# Patient Record
Sex: FEMALE | Race: WHITE | NOT HISPANIC OR LATINO | Employment: OTHER | URBAN - METROPOLITAN AREA
[De-identification: names, ages, dates, MRNs, and addresses within clinical notes are randomized per-mention and may not be internally consistent; named-entity substitution may affect disease eponyms.]

---

## 2021-05-29 ENCOUNTER — RECORDS - HEALTHEAST (OUTPATIENT)
Dept: ADMINISTRATIVE | Facility: CLINIC | Age: 71
End: 2021-05-29

## 2022-08-24 ENCOUNTER — TRANSFERRED RECORDS (OUTPATIENT)
Dept: HEALTH INFORMATION MANAGEMENT | Facility: CLINIC | Age: 72
End: 2022-08-24

## 2022-08-24 LAB — EJECTION FRACTION: 65 %

## 2022-08-25 ENCOUNTER — TELEPHONE (OUTPATIENT)
Dept: CARDIOLOGY | Facility: CLINIC | Age: 72
End: 2022-08-25

## 2022-08-29 ENCOUNTER — OFFICE VISIT (OUTPATIENT)
Dept: CARDIOLOGY | Facility: CLINIC | Age: 72
End: 2022-08-29
Attending: SURGERY
Payer: COMMERCIAL

## 2022-08-29 VITALS
BODY MASS INDEX: 28.82 KG/M2 | HEIGHT: 66 IN | HEART RATE: 61 BPM | OXYGEN SATURATION: 96 % | DIASTOLIC BLOOD PRESSURE: 93 MMHG | SYSTOLIC BLOOD PRESSURE: 113 MMHG | WEIGHT: 179.3 LBS

## 2022-08-29 DIAGNOSIS — R09.89 OTHER SPECIFIED SYMPTOMS AND SIGNS INVOLVING THE CIRCULATORY AND RESPIRATORY SYSTEMS: ICD-10-CM

## 2022-08-29 DIAGNOSIS — I25.119 CORONARY ARTERY DISEASE INVOLVING NATIVE CORONARY ARTERY OF NATIVE HEART WITH ANGINA PECTORIS (H): Primary | ICD-10-CM

## 2022-08-29 DIAGNOSIS — Z01.810 ENCOUNTER FOR PREPROCEDURAL CARDIOVASCULAR EXAMINATION: ICD-10-CM

## 2022-08-29 DIAGNOSIS — R07.89 OTHER CHEST PAIN: ICD-10-CM

## 2022-08-29 PROCEDURE — 99207 PR NON-BILLABLE SERV PER CHARTING: CPT | Performed by: SURGERY

## 2022-08-29 PROCEDURE — G0463 HOSPITAL OUTPT CLINIC VISIT: HCPCS

## 2022-08-29 RX ORDER — NITROGLYCERIN 0.4 MG/1
0.4 TABLET SUBLINGUAL EVERY 5 MIN PRN
COMMUNITY
Start: 2022-08-24

## 2022-08-29 RX ORDER — ZOLPIDEM TARTRATE 10 MG/1
10 TABLET ORAL
Status: ON HOLD | COMMUNITY
Start: 2022-08-12 | End: 2022-09-19

## 2022-08-29 RX ORDER — GABAPENTIN 100 MG/1
200 CAPSULE ORAL AT BEDTIME
COMMUNITY
Start: 2021-11-16

## 2022-08-29 RX ORDER — PRAVASTATIN SODIUM 10 MG
10 TABLET ORAL EVERY MORNING
COMMUNITY
Start: 2022-08-19 | End: 2023-04-14 | Stop reason: ALTCHOICE

## 2022-08-29 RX ORDER — ACYCLOVIR 400 MG/1
400 TABLET ORAL DAILY PRN
COMMUNITY
Start: 2022-08-22

## 2022-08-29 RX ORDER — PAROXETINE 20 MG/1
20 TABLET, FILM COATED ORAL EVERY MORNING
COMMUNITY
Start: 2022-08-22

## 2022-08-29 RX ORDER — HYDROCHLOROTHIAZIDE 50 MG/1
50 TABLET ORAL EVERY MORNING
Status: ON HOLD | COMMUNITY
Start: 2022-06-12 | End: 2022-09-19

## 2022-08-29 RX ORDER — SPIRONOLACTONE 50 MG/1
100 TABLET, FILM COATED ORAL EVERY MORNING
COMMUNITY
Start: 2022-06-12 | End: 2022-09-19

## 2022-08-29 RX ORDER — METOPROLOL SUCCINATE 25 MG/1
25 TABLET, EXTENDED RELEASE ORAL EVERY MORNING
COMMUNITY
Start: 2022-08-19

## 2022-08-29 RX ORDER — MUPIROCIN 20 MG/G
OINTMENT TOPICAL PRN
COMMUNITY
Start: 2021-09-05

## 2022-08-29 RX ORDER — SODIUM FLUORIDE 5 MG/G
GEL, DENTIFRICE DENTAL
COMMUNITY
Start: 2021-10-18

## 2022-08-29 RX ORDER — NYSTATIN 100000/ML
SUSPENSION, ORAL (FINAL DOSE FORM) ORAL
Status: ON HOLD | COMMUNITY
Start: 2021-12-03 | End: 2022-09-19

## 2022-08-29 RX ORDER — CELECOXIB 200 MG/1
200 CAPSULE ORAL EVERY MORNING
COMMUNITY
Start: 2022-08-01 | End: 2022-09-19

## 2022-08-29 RX ORDER — LISINOPRIL 40 MG/1
40 TABLET ORAL EVERY MORNING
COMMUNITY
Start: 2022-08-01 | End: 2022-09-19

## 2022-08-29 RX ORDER — AMLODIPINE BESYLATE 5 MG/1
5 TABLET ORAL EVERY MORNING
COMMUNITY
Start: 2022-08-01 | End: 2022-09-19

## 2022-08-29 RX ORDER — ASPIRIN 81 MG/1
81 TABLET, CHEWABLE ORAL EVERY MORNING
Status: ON HOLD | COMMUNITY
Start: 2022-08-18 | End: 2022-09-19

## 2022-08-29 RX ORDER — CHLORAL HYDRATE 500 MG
2 CAPSULE ORAL EVERY MORNING
COMMUNITY

## 2022-08-29 ASSESSMENT — PAIN SCALES - GENERAL: PAINLEVEL: NO PAIN (0)

## 2022-08-29 NOTE — LETTER
8/29/2022      RE: Luis Alberto Garcia  [address] [place] [address]       Dear Colleague,    Thank you for the opportunity to participate in the care of your patient, Luis Alberto Garcia, at the [facility]. Please see a copy of my visit note below.    HPI  Luis Alberto Garcia is a 72 year old female who is seeking a second opinion for CABG,  History is obtained from the patient and chart review     Patient who had been experiencing jaw/neck pain, chest pressure, and fatiguefor the past 10 months. She had a lexiscan completed showing normal perfusion imaging but abnormal ECG. Previous CT scan done showing moderate coronary calcifications. She was referred for coronary angiogram on 8/24/22 which revealed severe multivessel coronary disease.      Her history is otherwise significant for hypertension, mild aortic stenosis, smoking, childhood asthma, emphysema, GERD, fatty liver disease, migraines, OA with bilateral knee replacements, anxiety and insomnia.                Past Medical History       Past Medical History:   Diagnosis Date     Anxiety       Centrilobular emphysema (H)       Childhood asthma       Coronary artery disease involving native coronary artery of native heart       Esophageal reflux       Essential hypertension       Fatty liver disease       Hair loss       Migraine       Mild aortic stenosis       Mixed hyperlipidemia       MRSA infection       Skin abscesses after COVID vaccinations     Osteoarthritis       Primary insomnia              Past Surgical History  Past Surgical History         Past Surgical History:   Procedure Laterality Date     CATARACT IOL, RT/LT         IR LUMBAR EPIDURAL STEROID INJECTION   10/06/2005     IR LUMBAR EPIDURAL STEROID INJECTION   11/09/2005     Left fibular fracture ORIF   2013     REPLACEMENT TOTAL KNEE Left 2020     REPLACEMENT TOTAL KNEE Right 11/2021     ROTATOR CUFF REPAIR  RT/LT Right 2017            Prior to Admission Medications  Active Medications          Current Outpatient Medications   Medication Sig Dispense Refill     acyclovir (ZOVIRAX) 400 MG tablet Take 400 mg by mouth daily as needed         albuterol (PROAIR HFA/PROVENTIL HFA/VENTOLIN HFA) 108 (90 Base) MCG/ACT inhaler Inhale 2 puffs into the lungs every 6 hours         amLODIPine (NORVASC) 5 MG tablet Take 5 mg by mouth every morning         aspirin (ASA) 81 MG chewable tablet Take 81 mg by mouth every morning         celecoxib (CELEBREX) 200 MG capsule Take 200 mg by mouth every morning         Ergocalciferol 50 MCG (2000 UT) TABS 50 mcg every morning         fish oil-omega-3 fatty acids 1000 MG capsule Take 2 g by mouth every morning         gabapentin (NEURONTIN) 100 MG capsule 200 mg At Bedtime         hydrochlorothiazide (HYDRODIURIL) 50 MG tablet Take 50 mg by mouth every morning         lisinopril (ZESTRIL) 40 MG tablet Take 40 mg by mouth every morning         magnesium 100 MG CAPS 200 mg At Bedtime         metoprolol succinate ER (TOPROL XL) 25 MG 24 hr tablet Take 25 mg by mouth every morning         mupirocin (BACTROBAN) 2 % external ointment as needed         nitroGLYcerin (NITROSTAT) 0.4 MG sublingual tablet 0.4 mg every 5 minutes as needed         omeprazole (PRILOSEC) 20 MG DR capsule 20 mg At Bedtime         PARoxetine (PAXIL) 20 MG tablet Take 20 mg by mouth every morning         pravastatin (PRAVACHOL) 10 MG tablet Take 10 mg by mouth every morning         sodium fluoride dental gel (PREVIDENT) 1.1 % GEL topical gel Brush 2x/day.  Do not eat or drink for 30 minutes after.         spironolactone (ALDACTONE) 50 MG tablet 100 mg every morning         zolpidem (AMBIEN) 10 MG tablet 10 mg nightly as needed         nystatin (MYCOSTATIN) 770757 UNIT/ML suspension SWISH AND SWALLOW 5 ML BY MOUTH 4 TIMES A DAY. (Patient not taking: Reported on 9/8/2022)                Allergies        Allergies   Allergen  Reactions     Atorvastatin       Kiwi       Latex       Other Drug Allergy (See Comments)         X ray dye and shingrex vaccine          Social History  Social History   Social History            Socioeconomic History     Marital status: Single       Spouse name: Not on file     Number of children: Not on file     Years of education: Not on file     Highest education level: Not on file   Occupational History     Not on file   Tobacco Use     Smoking status: Former Smoker       Packs/day: 1.00       Years: 25.00       Pack years: 25.00       Types: Cigarettes       Quit date: 1992       Years since quittin.6     Smokeless tobacco: Never Used   Substance and Sexual Activity     Alcohol use: Yes       Comment: socially     Drug use: Not on file     Sexual activity: Not on file   Other Topics Concern     Not on file   Social History Narrative     Not on file      Social Determinants of Health      Financial Resource Strain: Not on file   Food Insecurity: Not on file   Transportation Needs: Not on file   Physical Activity: Not on file   Stress: Not on file   Social Connections: Not on file   Intimate Partner Violence: Not on file   Housing Stability: Not on file            Family History  Family History         Family History   Problem Relation Age of Onset     Lung Cancer Mother       LUNG DISEASE Father       Hypothyroidism Sister       Hypothyroidism Sister       Osteosarcoma Sister       Anesthesia Reaction No family hx of              Review of Systems  The complete review of systems is negative other than noted in the HPI or here.       Physical Exam  Vitals stable  Constitutional: Awake, alert, no apparent distress, and appears stated age.  HENT: Normocephalic  Respiratory: non labored breathing; no cough  Neurologic: Oriented to name, place and time. No focal deficits  RESP: normal breath sounds  Abd: soft, BS normal  Neuropsychiatric: Calm, cooperative. Normal affect.      Prior Labs/Diagnostic  Studies   All labs and imaging personally reviewed         Lab Results   Component Value Date     WBC 8.3 09/08/2022            Lab Results   Component Value Date     RBC 4.39 09/08/2022            Lab Results   Component Value Date     HGB 13.1 09/08/2022            Lab Results   Component Value Date     HCT 40.0 09/08/2022            Lab Results   Component Value Date     MCV 91 09/08/2022            Lab Results   Component Value Date     MCH 29.8 09/08/2022            Lab Results   Component Value Date     MCHC 32.8 09/08/2022            Lab Results   Component Value Date     RDW 13.6 09/08/2022            Lab Results   Component Value Date      09/08/2022      Last Comprehensive Metabolic Panel:        Sodium   Date Value Ref Range Status   09/08/2022 138 136 - 145 mmol/L Final            Potassium   Date Value Ref Range Status   09/08/2022 5.4 (H) 3.4 - 5.3 mmol/L Final            Chloride   Date Value Ref Range Status   09/08/2022 102 98 - 107 mmol/L Final            Carbon Dioxide (CO2)   Date Value Ref Range Status   09/08/2022 25 22 - 29 mmol/L Final            Anion Gap   Date Value Ref Range Status   09/08/2022 11 7 - 15 mmol/L Final            Glucose   Date Value Ref Range Status   09/08/2022 108 (H) 70 - 99 mg/dL Final            Urea Nitrogen   Date Value Ref Range Status   09/08/2022 25.9 (H) 8.0 - 23.0 mg/dL Final            Creatinine   Date Value Ref Range Status   09/08/2022 0.88 0.51 - 0.95 mg/dL Final              GFR Estimate   Date Value Ref Range Status   09/08/2022 69 >60 mL/min/1.73m2 Final       Comment:       Effective December 21, 2021 eGFRcr in adults is calculated using the 2021 CKD-EPI creatinine equation which includes age and gender (Skye et al., NEJM, DOI: 10.1056/SFAKry0414468)            Calcium   Date Value Ref Range Status   09/08/2022 9.8 8.8 - 10.2 mg/dL Final            Bilirubin Total   Date Value Ref Range Status   09/08/2022 0.8 <=1.2 mg/dL Final             Alkaline Phosphatase   Date Value Ref Range Status   2022 81 35 - 104 U/L Final            ALT   Date Value Ref Range Status   2022 21 10 - 35 U/L Final            AST   Date Value Ref Range Status   2022 23 10 - 35 U/L Final      INR 1.08  PTT 29     OSH 22 TSH 1.09     EK22 Sinus rhythm     Carotid US 22  IMPRESSION:     1. RIGHT ICA: Less than 50% diameter narrowing by grayscale imaging  and sonographic velocity criteria.     2. LEFT ICA:  Less than 50% diameter narrowing by grayscale imaging  and sonographic velocity criteria.     22 Echocardiogram   Summary   1. The left ventricular size is normal.  Systolic function is normal.  The estimated ejection fraction is 65%.  Wall thickness is normal.  Left ventricular segmental wall motion is normal.   2. The right ventricular size is normal.  Systolic function is normal.   3. There is mild aortic valve stenosis with a peak velocity of 269.00 cm/s, mean gradient of 18 mmHg, and aortic valve area of 1.54 cm2.   4. There is mild aortic valve regurgitation.   5. Compared to prior no significant change in AS.       22 Coronary Angiogram  Conclusions   1. Severe multivessel coronary artery disease. 70% prox LAD, 80% D1, 80% mid LCx, 70% OM1, 80% mid RCA.   2. Normal filling pressures. RA 4 mmHg, PA 25/11 (16) mmHg, PCWP/LVEDP 12 mmHg.   3. 20 mmHg peak to peak gradient across AV on pull back.   Recommendations   Continue medical management and risk factor modification.   Eval for CABG.      CXR 22   IMPRESSION: Negative chest.     CT Chest 22  IMPRESSION:   1. Decreased micronodular and ground glass opacities in the right  middle lobe, likely improving infectious process, with differential  including nontuberculous mycobacterial infection.  2. New 4 mm solid pulmonary nodule in the right upper lobe. If the  patient is low risk for lung cancer, no follow-up is required. If the  patient is high risk, consider follow-up  chest CT in 12 months per  Fleischner Society Guidelines.        The patient's records and results personally reviewed by me.      Outside records reviewed from: Care Everywhere        Assessment     Luis Alberto Garcia is a 72 year old female with severe coronary artery disease for which she was recommended CABG. I agree with this plan. I discussed the risks and benefits of surgery with patient and family including the risks of death, bleeding, stroke, infection, renal failure and arrhythmias. She understands and is willing to proceed with surgery.  She also has mild aortic stenosis on the ECHO. My recommendation would be to follow this with annual echo and consider her for TAVR at a later date.        Please do not hesitate to contact me if you have any questions/concerns.     Sincerely,     Galo Pathak MD     No

## 2022-08-29 NOTE — LETTER
Date:September 12, 2022      Patient was self referred, no letter generated. Do not send.        [facility] Health Information

## 2022-08-29 NOTE — LETTER
[facility] HEART [facility]    CARDIOTHORACIC SURGERY PRE-OP INSTRUCTIONS  Your Heart Surgery is scheduled with Galo Pathak MD  On 9/14/22 at 0730 am.  Please arrive at 0500 am.    Report to the information desk in the front lobby of the hospital at [address], [place], [place] [address]. When you walk in the main entrance of the hospital it is directly in front of you. Ask for an escort or the  can help direct you. You will then be escorted or directed to  on the third floor for your surgery preparation.     Effective 2/28/22 [facility] is implementing the following visitor policy:     1 person may accompany the patient through the Pre-Op process.      That same person may wait in the Surgery Waiting room, provided there is enough room to social distance       Inpatients are allowed 2 visitors per day for the duration of their stay.      Visitors must wear a mask.      Visitors must not be ill.      Visiting hours are 8 am to 8 pm.  "[employer] parking is available for anyone with mobility limitations or disabilities.  ([email or web address]  24 hours/ 7 days a week; [employer]  7 am- 3:30 pm, Mon- Fri)    COVID 19 TESTING    If you re staying overnight in the hospital: 4 days before your procedure, please get a PCR test from a lab, if the lab is requesting an order they can call . Tests that are 4 days or more prior to surgical date will not be accepted.  To schedule a PCR test with [facility], call [phone]. Or, visit [email or web address].  Please ask the testing location to fax your results to us at [phone].  If your test is positive, please contact Zane Myers RNCC at  to reschedule surgery.    After your test and before your procedure, please follow these safety guidelines:    Limit trips outside your home.    Limit the number of people you see.    Always wear a face covering outside your home.    Use social distancing. Stay 6 feet away from  others    whenever you can.    Wash your hands often.After the COVID test please protect yourself by following the CDC recommendations for disease prevention.  Wash hands regularly with soap and water and use hand    if soap and water is not available, wear a face mask, separate yourself from others by 6 feet and keep your hands away from your face.      Call your surgery coordinator Zane Myers RN or the [facility] number ([phone]) if you develop any of the following symptoms; fever, cough, shortness of breath, sore throat, runny or stuffy nose, muscle or body aches, headaches, fatigue, vomiting or diarrhea.      INSTRUCTIONS PRIOR TO SURGERY    Please review this letter and the enclosed booklet and other information in this surgery folder carefully and call Zane Myers RN at [phone] with any questions or concerns.      MEDICATIONS    ASPIRIN is okay to take up to the day before your surgery but NOT the day of your surgery. Take your other medications as you normally would until the day of surgery unless instructed otherwise. If you do not take aspirin do not start aspirin unless instructed otherwise.      Take your last dose of Aspirin on  9/13/22    STOP ALL ANTIINFLAMMATORY MEDICATIONS: (Ibuprofen, Aleve, Advil, Celebrex, Votaren, Ketoprofen, and Naproxen) 10 days prior to your surgery  STOP ALL SUPPLEMENTS 10 days prior to your surgery. This includes stopping Co-Q 10, vitamin E and all fish oil supplements.   Please check the labels on any OTC eye vitamins you may be taking.  They often contain vitamin E and should be stopped 10 days prior to surgery.     MEDICATIONS THE MORNING OF SURGERY    Take your Metoprolol the morning of surgery with a drink of clear liquid. Please tell your anesthesiologist what medication you took and at what time.     HISTORY AND PHYSICAL:  LABS and IMAGING  All tests and procedures must be within 30 days of your surgery date.     You do NOT need to fast for the  blood work.      You have a pre-op clinic visit beginning on 9/8/22 at 1315 in the [facility], [address] [place] [address]. A  or Coordinator will assist you. The Pre Assessment/Anesthesia Clinic is on the fifth floor.  The laboratory and radiology is on the first floor.      Your schedule is as follows:  9/8/22:  0115 PM Lab blood draw  0240 PM CT Chest  0300 PM Ultrasound lower extremities  0400 PM Ultrasound carotid arteries  0600 PM Pre anesthesia consult visit (Video visit)  9/12/22  0130 PM Transthoracic Echocardiogram  0230 PM EKG  0300 PM Covid Test  DO NOT EAT ANY SOLID FOODS AFTER MIDNIGHT or the morning of your surgery. NO MILK, MILK PRODUCTS, SMOOTHIES 8 HOURS PRIOR TO SURGERY.      DO NOT EAT ANYTHING, however you may have any clear liquids; water, black coffee (sugar okay), clear tea, sprite, ginger ale, apple juice, Gatorade or any clear liquid up to two hours before your surgery time. (No paige tea) No milk, or milk products, no smoothies or juice with pulp.     No alcohol for 24 hours prior to surgery.         BELONGINGS  Do not bring personal belongings, jewelry, money, valuables, toiletries or medications to the hospital the morning of your surgery. You may pack a bag and give it to a family member or friend to bring the following day or when needed.   Please remove all jewelry, including body piercings. Cautery instruments are used during surgery that may pose a risk of burns if a body piercing is left in during surgery.     Do bring a photo ID and insurance card.  A copy of your health care directives, if you have one.  Glasses and hearing aids (bring cases).  You cannot wear contact lenses during the surgery.  Inhaler(s) and eye drops if you use them, tell the staff about them when you arrive. Bring your CPAP machine or breathing device, if you use them.  If you have a pacemaker or ICD (cardiac defibrillator) bring the ID card.  If you have an  implanted stimulator, bring the remote control.  If you are a legal guardian bring a copy of the certified (court-stamped) guardianship papers.      Call Ce our  with questions regarding your test and clinic visit dates/times. She can be reached by phone at [phone] between 8 AM and 4:30PM Monday through Friday. Our answering service will  calls outside of those business hours.     If you have questions about your medications, test results or have a change in your health status call Zane Myers RN at [phone] during regular business hours.      On weekends or after 4:30 please call [phone] and ask the  to page the Cardiothoracic Fellow, Nurse Practitioner, Physician Assistant or Staff on call that weekend.     PLEASE NOTE, there are times when elective surgery (like yours) needs to be rescheduled due to an unplanned emergency or heart transplant. If this should happen, your surgery will be rescheduled as quickly as possible. Our surgery team appreciates your understanding during these instances.       Please feel free to call me or our office with any questions.     Thank you,      Zane Myers RN  Cardiothoracic Surgery Coordinator  [phone]  Direct Phone  [phone]  Fax

## 2022-08-29 NOTE — NURSING NOTE
Patient seen today for consultation for CABG    Surgery procedure explained to patient, daughter and son and all questions and concerns were answered and addressed.       Reviewed pre surgery tests and procedures needed and will call patient to schedule.      Pre surgery preparation folder with instructions for surgery preparation and recovery will be mailed to the patient.     Patient will call us tomorrow and inform us of her decision if she wants to have surgery done at [facility]. Then we will schedule a TTE and rest of the pre operative tests.     Instructed patient on preparation for TTE.     Patient and family verbalized understanding of all instructions and will call with any questions or concerns.       Zane Myers, RNCC  Cardiothoracic Surgery   [facility]  O) [phone]  F) [phone]

## 2022-08-29 NOTE — NURSING NOTE
Chief Complaint   Patient presents with     New Patient     New CV surgery - CABG           Vitals were taken and medications reconciled.     Jerome Randall, EMT   2:48 PM

## 2022-08-30 ENCOUNTER — TELEPHONE (OUTPATIENT)
Dept: CARDIOLOGY | Facility: CLINIC | Age: 72
End: 2022-08-30

## 2022-08-30 ENCOUNTER — PREP FOR PROCEDURE (OUTPATIENT)
Dept: CARDIOLOGY | Facility: CLINIC | Age: 72
End: 2022-08-30

## 2022-08-30 DIAGNOSIS — I25.10 CAD (CORONARY ARTERY DISEASE): Primary | ICD-10-CM

## 2022-08-30 NOTE — TELEPHONE ENCOUNTER
Called pt to inform of pre op appts scheduled.  Emailed pt Visitors Guide at pts request.  Pt will call with any questions.

## 2022-08-31 ENCOUNTER — TELEPHONE (OUTPATIENT)
Dept: CARDIOLOGY | Facility: CLINIC | Age: 72
End: 2022-08-31

## 2022-09-02 RX ORDER — CEFAZOLIN SODIUM/WATER 2 G/20 ML
2 SYRINGE (ML) INTRAVENOUS SEE ADMIN INSTRUCTIONS
Status: CANCELLED | OUTPATIENT
Start: 2022-09-02

## 2022-09-02 RX ORDER — CEFAZOLIN SODIUM/WATER 2 G/20 ML
2 SYRINGE (ML) INTRAVENOUS
Status: CANCELLED | OUTPATIENT
Start: 2022-09-02

## 2022-09-02 RX ORDER — DEXMEDETOMIDINE HYDROCHLORIDE 4 UG/ML
0.2-1.2 INJECTION, SOLUTION INTRAVENOUS CONTINUOUS
Status: CANCELLED | OUTPATIENT
Start: 2022-09-02

## 2022-09-02 RX ORDER — PHENYLEPHRINE HCL IN 0.9% NACL 50MG/250ML
.1-6 PLASTIC BAG, INJECTION (ML) INTRAVENOUS CONTINUOUS
Status: CANCELLED | OUTPATIENT
Start: 2022-09-02

## 2022-09-02 RX ORDER — ACETAMINOPHEN 325 MG/1
975 TABLET ORAL ONCE
Status: CANCELLED | OUTPATIENT
Start: 2022-09-02 | End: 2022-09-02

## 2022-09-02 RX ORDER — ASPIRIN 81 MG/1
162 TABLET, CHEWABLE ORAL
Status: CANCELLED | OUTPATIENT
Start: 2022-09-02

## 2022-09-02 RX ORDER — NOREPINEPHRINE BITARTRATE 0.06 MG/ML
.01-.1 INJECTION, SOLUTION INTRAVENOUS CONTINUOUS
Status: CANCELLED | OUTPATIENT
Start: 2022-09-02

## 2022-09-02 RX ORDER — FAMOTIDINE 20 MG/1
20 TABLET, FILM COATED ORAL
Status: CANCELLED | OUTPATIENT
Start: 2022-09-02

## 2022-09-02 RX ORDER — ASPIRIN 81 MG/1
81 TABLET, CHEWABLE ORAL
Status: CANCELLED | OUTPATIENT
Start: 2022-09-02

## 2022-09-02 RX ORDER — LIDOCAINE 40 MG/G
CREAM TOPICAL
Status: CANCELLED | OUTPATIENT
Start: 2022-09-02

## 2022-09-02 RX ORDER — CHLORHEXIDINE GLUCONATE ORAL RINSE 1.2 MG/ML
10 SOLUTION DENTAL ONCE
Status: CANCELLED | OUTPATIENT
Start: 2022-09-02 | End: 2022-09-02

## 2022-09-02 RX ORDER — GABAPENTIN 100 MG/1
100 CAPSULE ORAL
Status: CANCELLED | OUTPATIENT
Start: 2022-09-02

## 2022-09-07 LAB
ABO/RH(D): NORMAL
ANTIBODY SCREEN: NEGATIVE
SPECIMEN EXPIRATION DATE: NORMAL

## 2022-09-08 ENCOUNTER — ANESTHESIA EVENT (OUTPATIENT)
Dept: SURGERY | Facility: CLINIC | Age: 72
DRG: 236 | End: 2022-09-08
Payer: COMMERCIAL

## 2022-09-08 ENCOUNTER — ANCILLARY PROCEDURE (OUTPATIENT)
Dept: CT IMAGING | Facility: CLINIC | Age: 72
End: 2022-09-08
Attending: SURGERY
Payer: COMMERCIAL

## 2022-09-08 ENCOUNTER — PRE VISIT (OUTPATIENT)
Dept: SURGERY | Facility: CLINIC | Age: 72
End: 2022-09-08

## 2022-09-08 ENCOUNTER — LAB (OUTPATIENT)
Dept: LAB | Facility: CLINIC | Age: 72
End: 2022-09-08
Payer: COMMERCIAL

## 2022-09-08 ENCOUNTER — VIRTUAL VISIT (OUTPATIENT)
Dept: SURGERY | Facility: CLINIC | Age: 72
End: 2022-09-08

## 2022-09-08 ENCOUNTER — ANCILLARY PROCEDURE (OUTPATIENT)
Dept: ULTRASOUND IMAGING | Facility: CLINIC | Age: 72
End: 2022-09-08
Attending: SURGERY
Payer: COMMERCIAL

## 2022-09-08 DIAGNOSIS — I25.118 CORONARY ARTERY DISEASE INVOLVING NATIVE CORONARY ARTERY OF NATIVE HEART WITH OTHER FORM OF ANGINA PECTORIS (H): ICD-10-CM

## 2022-09-08 DIAGNOSIS — I25.119 CORONARY ARTERY DISEASE INVOLVING NATIVE CORONARY ARTERY OF NATIVE HEART WITH ANGINA PECTORIS (H): ICD-10-CM

## 2022-09-08 DIAGNOSIS — Z01.818 PREOP EXAMINATION: Primary | ICD-10-CM

## 2022-09-08 DIAGNOSIS — Z01.810 ENCOUNTER FOR PREPROCEDURAL CARDIOVASCULAR EXAMINATION: ICD-10-CM

## 2022-09-08 DIAGNOSIS — R07.89 OTHER CHEST PAIN: ICD-10-CM

## 2022-09-08 LAB
ALBUMIN SERPL BCG-MCNC: 4.3 G/DL (ref 3.5–5.2)
ALBUMIN UR-MCNC: NEGATIVE MG/DL
ALP SERPL-CCNC: 81 U/L (ref 35–104)
ALT SERPL W P-5'-P-CCNC: 21 U/L (ref 10–35)
ANION GAP SERPL CALCULATED.3IONS-SCNC: 11 MMOL/L (ref 7–15)
APPEARANCE UR: CLEAR
APTT PPP: 29 SECONDS (ref 22–38)
AST SERPL W P-5'-P-CCNC: 23 U/L (ref 10–35)
BILIRUB SERPL-MCNC: 0.8 MG/DL
BILIRUB UR QL STRIP: NEGATIVE
BUN SERPL-MCNC: 25.9 MG/DL (ref 8–23)
CALCIUM SERPL-MCNC: 9.8 MG/DL (ref 8.8–10.2)
CHLORIDE SERPL-SCNC: 102 MMOL/L (ref 98–107)
COLOR UR AUTO: YELLOW
CREAT SERPL-MCNC: 0.88 MG/DL (ref 0.51–0.95)
DEPRECATED HCO3 PLAS-SCNC: 25 MMOL/L (ref 22–29)
ERYTHROCYTE [DISTWIDTH] IN BLOOD BY AUTOMATED COUNT: 13.6 % (ref 10–15)
GFR SERPL CREATININE-BSD FRML MDRD: 69 ML/MIN/1.73M2
GLUCOSE SERPL-MCNC: 108 MG/DL (ref 70–99)
GLUCOSE UR STRIP-MCNC: NEGATIVE MG/DL
HCT VFR BLD AUTO: 40 % (ref 35–47)
HGB BLD-MCNC: 13.1 G/DL (ref 11.7–15.7)
HGB UR QL STRIP: NEGATIVE
INR PPP: 1.08 (ref 0.85–1.15)
KETONES UR STRIP-MCNC: NEGATIVE MG/DL
LEUKOCYTE ESTERASE UR QL STRIP: NEGATIVE
MCH RBC QN AUTO: 29.8 PG (ref 26.5–33)
MCHC RBC AUTO-ENTMCNC: 32.8 G/DL (ref 31.5–36.5)
MCV RBC AUTO: 91 FL (ref 78–100)
MUCOUS THREADS #/AREA URNS LPF: PRESENT /LPF
NITRATE UR QL: NEGATIVE
PH UR STRIP: 6 [PH] (ref 5–7)
PLATELET # BLD AUTO: 302 10E3/UL (ref 150–450)
POTASSIUM SERPL-SCNC: 5.4 MMOL/L (ref 3.4–5.3)
PROT SERPL-MCNC: 6.8 G/DL (ref 6.4–8.3)
RBC # BLD AUTO: 4.39 10E6/UL (ref 3.8–5.2)
RBC URINE: 1 /HPF
SODIUM SERPL-SCNC: 138 MMOL/L (ref 136–145)
SP GR UR STRIP: 1.01 (ref 1–1.03)
SQUAMOUS EPITHELIAL: <1 /HPF
UROBILINOGEN UR STRIP-MCNC: NORMAL MG/DL
WBC # BLD AUTO: 8.3 10E3/UL (ref 4–11)
WBC URINE: <1 /HPF

## 2022-09-08 PROCEDURE — 85730 THROMBOPLASTIN TIME PARTIAL: CPT | Performed by: PATHOLOGY

## 2022-09-08 PROCEDURE — 84134 ASSAY OF PREALBUMIN: CPT | Mod: 90 | Performed by: PATHOLOGY

## 2022-09-08 PROCEDURE — 86901 BLOOD TYPING SEROLOGIC RH(D): CPT | Mod: 90 | Performed by: PATHOLOGY

## 2022-09-08 PROCEDURE — 99205 OFFICE O/P NEW HI 60 MIN: CPT | Mod: GT | Performed by: CLINICAL NURSE SPECIALIST

## 2022-09-08 PROCEDURE — 85027 COMPLETE CBC AUTOMATED: CPT | Performed by: PATHOLOGY

## 2022-09-08 PROCEDURE — 36415 COLL VENOUS BLD VENIPUNCTURE: CPT | Performed by: PATHOLOGY

## 2022-09-08 PROCEDURE — 85610 PROTHROMBIN TIME: CPT | Performed by: PATHOLOGY

## 2022-09-08 PROCEDURE — 71250 CT THORAX DX C-: CPT | Mod: GC | Performed by: RADIOLOGY

## 2022-09-08 PROCEDURE — 86850 RBC ANTIBODY SCREEN: CPT | Mod: 90 | Performed by: PATHOLOGY

## 2022-09-08 PROCEDURE — 86900 BLOOD TYPING SEROLOGIC ABO: CPT | Mod: 90 | Performed by: PATHOLOGY

## 2022-09-08 PROCEDURE — 99000 SPECIMEN HANDLING OFFICE-LAB: CPT | Performed by: PATHOLOGY

## 2022-09-08 PROCEDURE — 93970 EXTREMITY STUDY: CPT | Performed by: RADIOLOGY

## 2022-09-08 PROCEDURE — 80053 COMPREHEN METABOLIC PANEL: CPT | Performed by: PATHOLOGY

## 2022-09-08 PROCEDURE — 81001 URINALYSIS AUTO W/SCOPE: CPT | Performed by: PATHOLOGY

## 2022-09-08 RX ORDER — ALBUTEROL SULFATE 90 UG/1
2 AEROSOL, METERED RESPIRATORY (INHALATION) EVERY 6 HOURS
COMMUNITY

## 2022-09-08 ASSESSMENT — ENCOUNTER SYMPTOMS
DYSRHYTHMIAS: 0
SEIZURES: 0

## 2022-09-08 ASSESSMENT — COPD QUESTIONNAIRES: COPD: 1

## 2022-09-08 ASSESSMENT — PAIN SCALES - GENERAL: PAINLEVEL: NO PAIN (0)

## 2022-09-08 ASSESSMENT — LIFESTYLE VARIABLES: TOBACCO_USE: 1

## 2022-09-08 NOTE — PATIENT INSTRUCTIONS
Preparing for Your Surgery      Name:  Luis Alberto Garcia   MRN:  [id number]   :  1950   Today's Date:  2022       Arriving for surgery:  Surgery date:  22  Arrival time:  05:00 am       Visiting hours: 8 a.m. to 8:30 p.m.     Hospital: Adult patients and children (under age 18 can have 4 visitor at a time     No visitors under the age of 5 are allowed for hospital patients.     Surgeries and procedures: Adult patients can have 2 visitors all through the surgery process.     Patients with disabilities: Can have a support person with them (family member, service provider     Or someone well informed about their needs) plus the allowed number of visitors     Patients confirmed or suspected to have symptoms of COVID 19 or flu:     No visitors allowed for adult patients.   Children (under age 18) can have 1 named visitor.     People who are sick or showing symptoms of COVID 19 or flu:    Are not allowed to visit patients--we can only make exceptions in special situations.       Please follow these guidelines for your visit:   Arrive wearing a mask over your mouth and nose; we will give you a medical mask to wear    If you arrive wearing a cloth mask.   Keep it on during your entire visit, even when in patient's room.   If you don't wear a mask we'll ask you to leave.     Clean your hands with alcohol hand . Do this when you arrive at and leave the building and patient room,    And again after you touch your mask or anything in the room.     You can t visit if you have a fever, cough, shortness of breath, muscle aches, headaches, sore throat    Or diarrhea      Stay 6 feet away from others during your visit and between visits     Go directly to and from the room you are visiting.     Stay in the patient s room during your visit. Limit going to other places in the hospital as much as possible     Leave bags and jackets at home or in the car.     For everyone s health, please don t come and go during  your visit. That includes for smoking   during your visit. That includes for smoking    Please come to:   [facility] [address]  [address] [place]  [address]  - ? parking is available in front of the hospital    -    Please proceed to [address] on the 3rd floor. [phone]?     - ?If you are in need of directions, wheelchair or escort please stop at the Information Desk in the lobby.  Inform the information person that you are here for surgery; a wheelchair and escort to [address] will be provided.?     What can I eat or drink?  -  You may eat and drink normally for up to 8 hours before your surgery.   -  You may have clear liquids until 2 hours before surgery.     Examples of clear liquids:  Water  Clear broth  Juices (apple, white grape, white cranberry  and cider) without pulp  Noncarbonated, powder based beverages  (lemonade and Jovon-Aid)  Sodas (Sprite, 7-Up, ginger ale and seltzer)  Coffee or tea (without milk or cream)  Gatorade    -  No Alcohol for at least 24 hours before surgery.     Which medicines can I take?  Hold Multivitamins for 7 days before surgery.  Hold Supplements (fish oil) for 7 days before surgery.  Hold Ibuprofen (Advil, Motrin) for 1 day before surgery--unless otherwise directed by surgeon.  Hold Naproxen (Aleve) for 4 days before surgery.  Hold celebrex x 3 days before surgery.    -  DO NOT take these medications the day of surgery:  Aspirin + lisinopril + spironolactone.    -  PLEASE TAKE these medications the day of surgery:  Tylenol if needed; take other morning medications.  Bring inhaler if using.    How do I prepare myself?  - Please take 2 showers before surgery using Scrubcare or Hibiclens soap.    Use this soap only from the neck to your toes.     Leave the soap on your skin for one minute--then rinse thoroughly.      You may use your own shampoo and conditioner. No other hair products.   - Please remove all  jewelry and body piercings.  - No lotions, deodorants or fragrance.  - No makeup or fingernail polish.   - Bring your ID and insurance card.    -If you have a Deep Brain Stimulator, Spinal Cord Stimulator, or any Neuro Stimulator device---you must bring the remote control to the hospital.        Questions or Concerns:    - For any questions regarding the day of surgery or your hospital stay, please contact the Pre Admission Nursing Office at [phone].       - If you have health changes between today and your surgery, please call your surgeon.       - For questions after surgery, please call your surgeons office.

## 2022-09-08 NOTE — H&P
Pre-Operative H & P     CC:  Preoperative exam to assess for increased cardiopulmonary risk while undergoing surgery and anesthesia.    Date of Encounter: 9/8/2022  Primary Care Physician:  Shiv Gomez     Reason for visit:   Encounter Diagnoses   Name Primary?     Preop examination Yes     Coronary artery disease        HPI  Luis Alberto Garcia is a 72 year old female who presents for pre-operative H & P in preparation for  Procedure Information     Case: [id number] Date/Time: 09/14/22 0730    Procedure: CORONARY ARTERY BYPASS GRAFT (CABG) AND ANY ASSOCIATED PROCEDURES (N/A Chest)    Anesthesia type: General    Diagnosis: CAD (coronary artery disease) [I25.10]    Pre-op diagnosis: CAD (coronary artery disease) [I25.10]    Location:  OR [address] [place] [address] OR    Providers: Galo Pathak MD        History is obtained from the patient and chart review    Patient who had been experiencing jaw/neck pain, chest pressure, and DYSPNEA ON EXERTION for the past 10 months. She had a lexiscan completed showing normal perfusion imaging but abnormal ECG. Previous CT scan done showing moderate coronary calcifications. She was referred for coronary angiogram on 8/24/22 which revealed severe multivessel coronary disease. She was referred to Dr. Pathak and counseled for above procedures.    Her history is otherwise significant for HLD, HYPERTENSION, mild aortic stenosis, smoking, childhood asthma, emphysema, GERD, fatty liver disease, migraines, OA with bilateral knee replacements, anxiety and insomnia.      Today patient reported two other health issues. 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 She takes gabapentin at night which helps.     She also reports that she has had episodes of significant MRSA skin infections after receiving her COVID vaccinations. She was treated but she is concerned about this for  her upcoming surgery. She does have some mupirocin ointment at home and is wondering if she should start using it.     She is an RN    Hx of abnormal bleeding or anti-platelet use: ASA 81 mg daily    Menstrual history: No LMP recorded. Patient is postmenopausal.     Past Medical History  Past Medical History:   Diagnosis Date     Anxiety      Centrilobular emphysema (H)      Childhood asthma      Coronary artery disease involving native coronary artery of native heart      Esophageal reflux      Essential hypertension      Fatty liver disease      Hair loss      Migraine      Mild aortic stenosis      Mixed hyperlipidemia      MRSA infection     Skin abscesses after COVID vaccinations     Osteoarthritis      Primary insomnia        Past Surgical History  Past Surgical History:   Procedure Laterality Date     CATARACT IOL, RT/LT       IR LUMBAR EPIDURAL STEROID INJECTION  10/06/2005     IR LUMBAR EPIDURAL STEROID INJECTION  11/09/2005     Left fibular fracture ORIF  2013     REPLACEMENT TOTAL KNEE Left 2020     REPLACEMENT TOTAL KNEE Right 11/2021     ROTATOR CUFF REPAIR RT/LT Right 2017       Prior to Admission Medications  Current Outpatient Medications   Medication Sig Dispense Refill     acyclovir (ZOVIRAX) 400 MG tablet Take 400 mg by mouth daily as needed       albuterol (PROAIR HFA/PROVENTIL HFA/VENTOLIN HFA) 108 (90 Base) MCG/ACT inhaler Inhale 2 puffs into the lungs every 6 hours       amLODIPine (NORVASC) 5 MG tablet Take 5 mg by mouth every morning       aspirin (ASA) 81 MG chewable tablet Take 81 mg by mouth every morning       celecoxib (CELEBREX) 200 MG capsule Take 200 mg by mouth every morning       Ergocalciferol 50 MCG (2000 UT) TABS 50 mcg every morning       fish oil-omega-3 fatty acids 1000 MG capsule Take 2 g by mouth every morning       gabapentin (NEURONTIN) 100 MG capsule 200 mg At Bedtime       hydrochlorothiazide (HYDRODIURIL) 50 MG tablet Take 50 mg by mouth every morning       lisinopril  (ZESTRIL) 40 MG tablet Take 40 mg by mouth every morning       magnesium 100 MG CAPS 200 mg At Bedtime       metoprolol succinate ER (TOPROL XL) 25 MG 24 hr tablet Take 25 mg by mouth every morning       mupirocin (BACTROBAN) 2 % external ointment as needed       nitroGLYcerin (NITROSTAT) 0.4 MG sublingual tablet 0.4 mg every 5 minutes as needed       omeprazole (PRILOSEC) 20 MG DR capsule 20 mg At Bedtime       PARoxetine (PAXIL) 20 MG tablet Take 20 mg by mouth every morning       pravastatin (PRAVACHOL) 10 MG tablet Take 10 mg by mouth every morning       sodium fluoride dental gel (PREVIDENT) 1.1 % GEL topical gel Brush 2x/day.  Do not eat or drink for 30 minutes after.       spironolactone (ALDACTONE) 50 MG tablet 100 mg every morning       zolpidem (AMBIEN) 10 MG tablet 10 mg nightly as needed       nystatin (MYCOSTATIN) 252839 UNIT/ML suspension SWISH AND SWALLOW 5 ML BY MOUTH 4 TIMES A DAY. (Patient not taking: Reported on 2022)         Allergies  Allergies   Allergen Reactions     Atorvastatin      Kiwi      Latex      Other Drug Allergy (See Comments)      X ray dye and shingrex vaccine        Social History  Social History     Socioeconomic History     Marital status: Single     Spouse name: Not on file     Number of children: Not on file     Years of education: Not on file     Highest education level: Not on file   Occupational History     Not on file   Tobacco Use     Smoking status: Former Smoker     Packs/day: 1.00     Years: 25.00     Pack years: 25.00     Types: Cigarettes     Quit date: 1992     Years since quittin.6     Smokeless tobacco: Never Used   Substance and Sexual Activity     Alcohol use: Yes     Comment: socially     Drug use: Not on file     Sexual activity: Not on file   Other Topics Concern     Not on file   Social History Narrative     Not on file     Social Determinants of Health     Financial Resource Strain: Not on file   Food Insecurity: Not on file   Transportation  Needs: Not on file   Physical Activity: Not on file   Stress: Not on file   Social Connections: Not on file   Intimate Partner Violence: Not on file   Housing Stability: Not on file       Family History  Family History   Problem Relation Age of Onset     Lung Cancer Mother      LUNG DISEASE Father      Hypothyroidism Sister      Hypothyroidism Sister      Osteosarcoma Sister      Anesthesia Reaction No family hx of        Review of Systems  The complete review of systems is negative other than noted in the HPI or here.   Anesthesia Evaluation   Pt has had prior anesthetic. Type: General and MAC.    No history of anesthetic complications       ROS/MED HX  ENT/Pulmonary: Comment: Childhood asthma, rare use of inhaler now    (+) MARKO risk factors, hypertension, tobacco use, Past use, 25  Pack-Year Hx,  asthma Treatment: Inhaler prn,  COPD,  (-) recent URI   Neurologic: Comment: Brachial-radial pruritis attributed to issue with C spine. Patient reports itching along her right arm along with tingling sensation. Gabapentin at HS.    (+) migraines,  (-) no seizures and no CVA   Cardiovascular:     (+) Dyslipidemia hypertension--CAD angina-with excertion. --Taking blood thinners Pt has received instructions: Instructions Given to patient: Will remain on up to DOS. LOPEZ. Irregular Heartbeat/Palpitations, valvular problems/murmurs type: AS mild. Previous cardiac testing   Echo: Date: 8/24/22 Results:    Stress Test: Date: Results:    ECG Reviewed: Date: 8/18/22 Results:  SR  Cath: Date: 8/24/22 Results:   (-) arrhythmias   METS/Exercise Tolerance: 3 - Able to walk 1-2 blocks without stopping Comment: Recently activity has been more limited. Begins to feel chest pain and DYSPNEA ON EXERTION after climbing a 3rd flight of stairs   Hematologic:    (-) history of blood clots and history of blood transfusion   Musculoskeletal: Comment: History of bilateral knee replacements  (+) arthritis,     GI/Hepatic: Comment: Fatty liver  disease    (+) GERD, Asymptomatic on medication, liver disease,     Renal/Genitourinary:  - neg Renal ROS     Endo:  - neg endo ROS     Psychiatric/Substance Use: Comment: Insomnia    (+) psychiatric history anxiety     Infectious Disease: Comment: Patient reports significant MRSA skin abscesses after receiving COVID vaccinations. Treated.       Malignancy:  - neg malignancy ROS     Other:  - neg other ROS          Virtual visit -  No vitals were obtained    Physical Exam  Constitutional: Awake, alert, no apparent distress, and appears stated age.  HENT: Normocephalic  Respiratory: non labored breathing; no cough  Neurologic: Oriented to name, place and time.   Neuropsychiatric: Calm, cooperative. Normal affect.      Prior Labs/Diagnostic Studies   All labs and imaging personally reviewed   Lab Results   Component Value Date    WBC 8.3 09/08/2022     Lab Results   Component Value Date    RBC 4.39 09/08/2022     Lab Results   Component Value Date    HGB 13.1 09/08/2022     Lab Results   Component Value Date    HCT 40.0 09/08/2022     Lab Results   Component Value Date    MCV 91 09/08/2022     Lab Results   Component Value Date    MCH 29.8 09/08/2022     Lab Results   Component Value Date    MCHC 32.8 09/08/2022     Lab Results   Component Value Date    RDW 13.6 09/08/2022     Lab Results   Component Value Date     09/08/2022     Last Comprehensive Metabolic Panel:  Sodium   Date Value Ref Range Status   09/08/2022 138 136 - 145 mmol/L Final     Potassium   Date Value Ref Range Status   09/08/2022 5.4 (H) 3.4 - 5.3 mmol/L Final     Chloride   Date Value Ref Range Status   09/08/2022 102 98 - 107 mmol/L Final     Carbon Dioxide (CO2)   Date Value Ref Range Status   09/08/2022 25 22 - 29 mmol/L Final     Anion Gap   Date Value Ref Range Status   09/08/2022 11 7 - 15 mmol/L Final     Glucose   Date Value Ref Range Status   09/08/2022 108 (H) 70 - 99 mg/dL Final     Urea Nitrogen   Date Value Ref Range Status    2022 25.9 (H) 8.0 - 23.0 mg/dL Final     Creatinine   Date Value Ref Range Status   2022 0.88 0.51 - 0.95 mg/dL Final     GFR Estimate   Date Value Ref Range Status   2022 69 >60 mL/min/1.73m2 Final     Comment:     Effective 2021 eGFRcr in adults is calculated using the  CKD-EPI creatinine equation which includes age and gender (Skye et al., [place], DOI: 10.[facility]6/NTQTsp4336417)     Calcium   Date Value Ref Range Status   2022 9.8 8.8 - 10.2 mg/dL Final     Bilirubin Total   Date Value Ref Range Status   2022 0.8 <=1.2 mg/dL Final     Alkaline Phosphatase   Date Value Ref Range Status   2022 81 35 - 104 U/L Final     ALT   Date Value Ref Range Status   2022 21 10 - 35 U/L Final     AST   Date Value Ref Range Status   2022 23 10 - 35 U/L Final     INR 1.08  PTT 29    OSH 22 TSH 1.09    EK22 Sinus rhythm    Carotid US 22  IMPRESSION:     1. RIGHT ICA: Less than 50% diameter narrowing by grayscale imaging  and sonographic velocity criteria.     2. LEFT ICA:  Less than 50% diameter narrowing by grayscale imaging  and sonographic velocity criteria.    22 Echocardiogram   Summary   1. The left ventricular size is normal.  Systolic function is normal.  The estimated ejection fraction is 65%.  Wall thickness is normal.  Left ventricular segmental wall motion is normal.   2. The right ventricular size is normal.  Systolic function is normal.   3. There is mild aortic valve stenosis with a peak velocity of 269.00 cm/s, mean gradient of 18 mmHg, and aortic valve area of 1.54 cm2.   4. There is mild aortic valve regurgitation.   5. Compared to prior no significant change in AS.      22 Coronary Angiogram  Conclusions   1. Severe multivessel coronary artery disease. 70% prox LAD, 80% D1, 80% mid LCx, 70% OM1, 80% mid RCA.   2. Normal filling pressures. RA 4 mmHg, PA 25/11 (16) mmHg, PCWP/LVEDP 12 mmHg.   3. 20 mmHg peak to peak gradient  across AV on pull back.   Recommendations   Continue medical management and risk factor modification.   Eval for CABG.     CXR 8/18/22   IMPRESSION: Negative chest.    CT Chest 9/8/22  IMPRESSION:   1. Decreased micronodular and ground glass opacities in the right  middle lobe, likely improving infectious process, with differential  including nontuberculous mycobacterial infection.  2. New 4 mm solid pulmonary nodule in the right upper lobe. If the  patient is low risk for lung cancer, no follow-up is required. If the  patient is high risk, consider follow-up chest CT in 12 months per  Fleischner Society Guidelines.      The patient's records and results personally reviewed by this provider.     Outside records reviewed from: Care Everywhere      Assessment      Luis Alberto Garcia is a 72 year old female seen as a PAC referral for risk assessment and optimization for anesthesia.    Plan/Recommendations  Pt will be optimized for the proposed procedure.  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 She experiences itching down her right arm along with tingling. Gabapentin at HS.   -Post Op delirium risk factors:  Age    ENT  - No current airway concerns.  Will need to be reassessed day of surgery.  Mallampati: Unable to assess  TM: Unable to assess  Upper right front tooth temporary crown (#8). Upper six front teeth veneers.     CARDIAC  HLD Will take pravastatin on DOS. HYPERTENSION. Will take amlodipine and metoprolol on DOS. Will hold hydrochlorothiazide, lisinopril and spironolactone on DOS. CAD, as above with exertional symptoms. Mild aortic stenosis. All testing above. Will remain on aspirin up to DOS.     RCRI: 0.9% risk of serious cardiac events    PULMONARY  Childhood asthma. Centrilobular emphysema. No 02. Denies cough or shortness of breath. Rare use of  "inhaler, only with URI. May take albuterol inhaler on DOS and bring along     Low risk for MARKO    - Tobacco History      History   Smoking Status     Former Smoker     Packs/day: 1.00     Years: 25.00     Types: Cigarettes     Quit date: 2/2/1992   Smokeless Tobacco     Never Used       GI: GERD. Omeprazole at HS.  PONV Medium Risk  Total Score: 2           1 AN PONV: Pt is Female    1 AN PONV: Patient is not a current smoker       Past history of PONV    /RENAL  - Baseline Creatinine 0.88    ENDOCRINE:[id number]}  - BMI: Estimated body mass index is 29.16 kg/m  as calculated from the following:    Height as of 8/29/22: 1.67 m (5' 5.75\").    Weight as of 8/29/22: 81.3 kg (179 lb 4.8 oz).  Overweight (BMI 25.0-29.9)  - No history of Diabetes Mellitus  Random glucose today 108    HEME  VTE Low Risk 0.26%            Total Score: 0      Denies history of blood clots  Denies history of blood transfusions. Type and screen drawn today by service    MSK: Patient is not frail. OA with history of bilateral knee replacements 2020, and 2021. Right rotator cuff repair. Has been holding Celebrex since 9/5/22.     PSYCH  - Anxiety. Will take Paxil on DOS. Insomnia    ID: Patient with history of MRSA skin abscesses after receiving COVID vaccinations. Treated at that time but she is concerned about surgical site infection. She has mupirocin ointment and is wondering if she should start using in nares before surgery. Message to Dr. Pathak and coordinator to notify them of her history and to ask recommendations for management. Patient will be contacted by coordinator for further instructions.     The patient is optimized for their procedure. AVS with information on surgery time/arrival time, meds and NPO status given by nursing staff. No further diagnostic testing indicated.    Please refer to the physical examination documented by the anesthesiologist in the anesthesia record on the day of surgery.    Video-Visit Details    Type of " service:  Video Visit    Patient verbally consented to video service today: YES    Video Start Time: 4:31pm   Video End Time (time video stopped): 4:57pm     Originating Location (pt. Location): Home    Distant Location (provider location):  [facility]     Mode of Communication:  Video Conference via Dox[employer]  On the day of service:     Prep time: 22 minutes  Visit time: 26 minutes  Documentation time: 30 minutes  ------------------------------------------  Total time: 78 minutes      MARCOS Morris CNS  Preoperative Assessment Center  [facility]  Clinic and Surgery Center  Phone: [phone]  Fax: [phone]

## 2022-09-08 NOTE — PROGRESS NOTES
Luis Alberto is a 72 year old who is being evaluated via a billable video visit.      How would you like to obtain your AVS? MyChart   If the video visit is dropped, the invitation should be resent by: Text to cell phone: [phone]    HPI       Review of Systems         Objective    Vitals - Patient Reported  Pain Score: No Pain (0)        Physical Exam

## 2022-09-09 LAB — PREALB SERPL IA-MCNC: 32 MG/DL (ref 15–45)

## 2022-09-12 ENCOUNTER — HOSPITAL ENCOUNTER (OUTPATIENT)
Dept: CARDIOLOGY | Facility: CLINIC | Age: 72
Discharge: HOME | DRG: 236 | End: 2022-09-12
Attending: SURGERY
Payer: COMMERCIAL

## 2022-09-12 ENCOUNTER — LAB (OUTPATIENT)
Dept: LAB | Facility: CLINIC | Age: 72
End: 2022-09-12
Attending: SURGERY

## 2022-09-12 DIAGNOSIS — I25.119 CORONARY ARTERY DISEASE INVOLVING NATIVE CORONARY ARTERY OF NATIVE HEART WITH ANGINA PECTORIS (H): ICD-10-CM

## 2022-09-12 LAB
LVEF ECHO: NORMAL
SARS-COV-2 RNA RESP QL NAA+PROBE: NEGATIVE

## 2022-09-12 PROCEDURE — U0005 INFEC AGEN DETEC AMPLI PROBE: HCPCS | Mod: 90 | Performed by: PATHOLOGY

## 2022-09-12 PROCEDURE — 99000 SPECIMEN HANDLING OFFICE-LAB: CPT | Performed by: PATHOLOGY

## 2022-09-12 PROCEDURE — 93306 TTE W/DOPPLER COMPLETE: CPT

## 2022-09-12 PROCEDURE — 93306 TTE W/DOPPLER COMPLETE: CPT | Mod: 26 | Performed by: INTERNAL MEDICINE

## 2022-09-12 PROCEDURE — U0003 INFECTIOUS AGENT DETECTION BY NUCLEIC ACID (DNA OR RNA); SEVERE ACUTE RESPIRATORY SYNDROME CORONAVIRUS 2 (SARS-COV-2) (CORONAVIRUS DISEASE [COVID-19]), AMPLIFIED PROBE TECHNIQUE, MAKING USE OF HIGH THROUGHPUT TECHNOLOGIES AS DESCRIBED BY CMS-2020-01-R: HCPCS | Mod: 90 | Performed by: PATHOLOGY

## 2022-09-12 PROCEDURE — 93010 ELECTROCARDIOGRAM REPORT: CPT | Performed by: INTERNAL MEDICINE

## 2022-09-12 PROCEDURE — 93005 ELECTROCARDIOGRAM TRACING: CPT

## 2022-09-13 LAB
ATRIAL RATE - MUSE: 64 BPM
DIASTOLIC BLOOD PRESSURE - MUSE: NORMAL MMHG
INTERPRETATION ECG - MUSE: NORMAL
P AXIS - MUSE: 67 DEGREES
PR INTERVAL - MUSE: 170 MS
QRS DURATION - MUSE: 82 MS
QT - MUSE: 404 MS
QTC - MUSE: 416 MS
R AXIS - MUSE: 66 DEGREES
SYSTOLIC BLOOD PRESSURE - MUSE: NORMAL MMHG
T AXIS - MUSE: 41 DEGREES
VENTRICULAR RATE- MUSE: 64 BPM

## 2022-09-13 ASSESSMENT — ENCOUNTER SYMPTOMS
SEIZURES: 0
DYSRHYTHMIAS: 0

## 2022-09-13 ASSESSMENT — LIFESTYLE VARIABLES: TOBACCO_USE: 1

## 2022-09-13 ASSESSMENT — COPD QUESTIONNAIRES: COPD: 1

## 2022-09-13 NOTE — ANESTHESIA PREPROCEDURE EVALUATION
Anesthesia Pre-Procedure Evaluation    Patient: Luis Alberto Garcia   MRN: [id number] : 1950        Procedure : Procedure(s):  CORONARY ARTERY BYPASS GRAFT (CABG) AND ANY ASSOCIATED PROCEDURES **latex allergy**          Past Medical History:   Diagnosis Date     Anxiety      Centrilobular emphysema (H)      Childhood asthma      Coronary artery disease involving native coronary artery of native heart      Esophageal reflux      Essential hypertension      Fatty liver disease      Hair loss      Migraine      Mild aortic stenosis      Mixed hyperlipidemia      MRSA infection     Skin abscesses after COVID vaccinations     Osteoarthritis      Primary insomnia       Past Surgical History:   Procedure Laterality Date     CATARACT IOL, RT/LT       IR LUMBAR EPIDURAL STEROID INJECTION  10/06/2005     IR LUMBAR EPIDURAL STEROID INJECTION  2005     Left fibular fracture ORIF       REPLACEMENT TOTAL KNEE Left      REPLACEMENT TOTAL KNEE Right 2021     ROTATOR CUFF REPAIR RT/LT Right 2017      Allergies   Allergen Reactions     Atorvastatin      Kiwi      Latex      Other Drug Allergy (See Comments)      X ray dye and shingrex vaccine       Social History     Tobacco Use     Smoking status: Former Smoker     Packs/day: 1.00     Years: 25.00     Pack years: 25.00     Types: Cigarettes     Quit date: 1992     Years since quittin.6     Smokeless tobacco: Never Used   Substance Use Topics     Alcohol use: Yes     Comment: socially      Wt Readings from Last 1 Encounters:   22 81.3 kg (179 lb 4.8 oz)        Anesthesia Evaluation   Pt has had prior anesthetic. Type: General and MAC.    No history of anesthetic complications       ROS/MED HX  ENT/Pulmonary: Comment: Childhood asthma, rare use of inhaler now    (+) MARKO risk factors, hypertension, tobacco use, Past use, 25  Pack-Year Hx,  asthma Treatment: Inhaler prn,  COPD,  (-) recent URI   Neurologic: Comment: Brachial-radial pruritis attributed  to issue with C spine. Patient reports itching along her right arm along with tingling sensation. Gabapentin at HS.    (+) migraines,  (-) no seizures and no CVA   Cardiovascular: Comment:       (+) Dyslipidemia hypertension--CAD angina-with excertion. --Taking blood thinners Pt has received instructions: Instructions Given to patient: Will remain on up to DOS. LOPEZ. Irregular Heartbeat/Palpitations, valvular problems/murmurs type: AS mild. Previous cardiac testing   Echo: Date: 8/24/22 Results:  Interpretation Summary  Global and regional left ventricular function is normal with an EF of 60-65%.  Right ventricular function, chamber size, wall motion, and thickness are  normal.  Mild to moderate aortic insufficiency is present.  Pulmonary artery systolic pressure is normal.  The inferior vena cava is normal.  No pericardial effusion is present.  There is no prior study for direct comparison.  Stress Test:  Date: 7/21/22 Results:  1. A regadenoson infusion pharmacologic stress test.. Functional capacity was not assessed.     2. Patient experienced headache, nausea, arm weakness and neck discomfort with infusion.     3. Approximately 1 mm downsloping ST depression with stress and persisting into recovery.     4. Myocardial perfusion imaging is normal.     5. No evidence of ischemia or definite infarction.     6. Normal left ventricular size and systolic function. LV EF >/= 70%.     7. This was technically a normal perfusion study. However, the ECG response was abnormal and a chest CT documented moderate coronary calcification.   CAD cannot be completely ruled out by this study and cardiology consultation should be considered.     ECG Reviewed:  Date: 8/18/22 Results:  SR  Cath:  Date: 8/24/22 Results:  Conclusions     1. Severe multivessel coronary artery disease. 70% prox LAD, 80% D1, 80% mid LCx, 70% OM1, 80% mid RCA.     2. Normal filling pressures. RA 4 mmHg, PA 25/11 (16) mmHg, PCWP/LVEDP 12 mmHg.     3. 20 mmHg  peak to peak gradient across AV on pull back.     Diagnostic Findings     * Coronary angiography shows right dominance.     * LM: minimal luminal irregularities.     * LAD: prox 70% stenosis, D1 80% stenosis.     * LCx: mid 80% stenosis, OM1 70% stenosis.     * RCA: mid 80% stenosis.  (-) arrhythmias, stent and CABG   METS/Exercise Tolerance: 3 - Able to walk 1-2 blocks without stopping Comment: Recently activity has been more limited. Begins to feel chest pain and DYSPNEA ON EXERTION after climbing a 3rd flight of stairs   Hematologic:    (-) history of blood clots and history of blood transfusion   Musculoskeletal: Comment: History of bilateral knee replacements  (+) arthritis,     GI/Hepatic: Comment: Fatty liver disease    (+) GERD, Asymptomatic on medication, liver disease,     Renal/Genitourinary:  - neg Renal ROS     Endo:  - neg endo ROS     Psychiatric/Substance Use: Comment: Insomnia    (+) psychiatric history anxiety     Infectious Disease: Comment: Patient reports significant MRSA skin abscesses after receiving COVID vaccinations. Treated.       Malignancy:  - neg malignancy ROS     Other:  - neg other ROS          Physical Exam    Airway        Mallampati: I   TM distance: > 3 FB   Neck ROM: full   Mouth opening: > 3 cm    Respiratory Devices and Support         Dental  no notable dental history         Cardiovascular          Rhythm and rate: regular and normal   (+) murmur       Pulmonary           breath sounds clear to auscultation           OUTSIDE LABS:  CBC:   Lab Results   Component Value Date    WBC 8.3 09/08/2022    HGB 13.1 09/08/2022    HCT 40.0 09/08/2022     09/08/2022     BMP:   Lab Results   Component Value Date     09/08/2022    POTASSIUM 5.4 (H) 09/08/2022    CHLORIDE 102 09/08/2022    CO2 25 09/08/2022    BUN 25.9 (H) 09/08/2022    CR 0.88 09/08/2022     (H) 09/08/2022     COAGS:   Lab Results   Component Value Date    PTT 29 09/08/2022    INR 1.08 09/08/2022      POC: No results found for: BGM, HCG, HCGS  HEPATIC:   Lab Results   Component Value Date    ALBUMIN 4.3 09/08/2022    PROTTOTAL 6.8 09/08/2022    ALT 21 09/08/2022    AST 23 09/08/2022    ALKPHOS 81 09/08/2022    BILITOTAL 0.8 09/08/2022     OTHER:   Lab Results   Component Value Date    ALYCE 9.8 09/08/2022       Anesthesia Plan    ASA Status:  4      Anesthesia Type: General.     - Airway: ETT   Induction: Intravenous.   Maintenance: Balanced.   Techniques and Equipment:     - Lines/Monitors: Arterial Line, Central Line, PAC, CVP, BIS, NIRS, CAROLINA            CAROLINA Absolute Contra-indication: NONE            CAROLINA Relative Contra-indication: NONE     - Blood: T&S, Cell Saver, FFP, PLT, PRBC, T&C     - Drips/Meds: Norepi, Epinephrine     Consents    Anesthesia Plan(s) and associated risks, benefits, and realistic alternatives discussed. Questions answered and patient/representative(s) expressed understanding.     - Discussed: Risks, Benefits and Alternatives for the PROCEDURE were discussed     - Discussed with:  Patient      - Extended Intubation/Ventilatory Support Discussed: Yes.      - Patient is DNR/DNI Status: No    Use of blood products discussed: Yes.     - Discussed with: Patient.     - Consented: consented to blood products            Reason for refusal: other.     Postoperative Care    Pain management: IV analgesics, Multi-modal analgesia, Peripheral nerve block (Continuous).   PONV prophylaxis: Ondansetron (or other 5HT-3), Dexamethasone or Solumedrol     Comments:                Teofilo Portillo MD

## 2022-09-14 ENCOUNTER — APPOINTMENT (OUTPATIENT)
Dept: GENERAL RADIOLOGY | Facility: CLINIC | Age: 72
DRG: 236 | End: 2022-09-14
Attending: SURGERY
Payer: COMMERCIAL

## 2022-09-14 ENCOUNTER — HOSPITAL ENCOUNTER (INPATIENT)
Facility: CLINIC | Age: 72
LOS: 5 days | Discharge: HOME | DRG: 236 | End: 2022-09-19
Attending: SURGERY | Admitting: SURGERY
Payer: COMMERCIAL

## 2022-09-14 ENCOUNTER — ANESTHESIA (OUTPATIENT)
Dept: SURGERY | Facility: CLINIC | Age: 72
DRG: 236 | End: 2022-09-14
Payer: COMMERCIAL

## 2022-09-14 DIAGNOSIS — Z95.1 S/P CABG X 5: Primary | ICD-10-CM

## 2022-09-14 DIAGNOSIS — I25.119 CORONARY ARTERY DISEASE INVOLVING NATIVE CORONARY ARTERY OF NATIVE HEART WITH ANGINA PECTORIS (H): ICD-10-CM

## 2022-09-14 LAB
ABO/RH(D): NORMAL
ALBUMIN SERPL BCG-MCNC: 2.5 G/DL (ref 3.5–5.2)
ALP SERPL-CCNC: 45 U/L (ref 35–104)
ALT SERPL W P-5'-P-CCNC: 13 U/L (ref 10–35)
ANION GAP SERPL CALCULATED.3IONS-SCNC: 11 MMOL/L (ref 7–15)
ANION GAP SERPL CALCULATED.3IONS-SCNC: 6 MMOL/L (ref 7–15)
ANION GAP SERPL CALCULATED.3IONS-SCNC: 9 MMOL/L (ref 7–15)
ANTIBODY SCREEN: NEGATIVE
APTT PPP: 32 SECONDS (ref 22–38)
APTT PPP: 32 SECONDS (ref 22–38)
APTT PPP: 39 SECONDS (ref 22–38)
AST SERPL W P-5'-P-CCNC: 36 U/L (ref 10–35)
BASE EXCESS BLDA CALC-SCNC: -0.3 MMOL/L (ref -9.6–2)
BASE EXCESS BLDA CALC-SCNC: -0.3 MMOL/L (ref -9–1.8)
BASE EXCESS BLDA CALC-SCNC: -0.5 MMOL/L (ref -9.6–2)
BASE EXCESS BLDA CALC-SCNC: -0.5 MMOL/L (ref -9.6–2)
BASE EXCESS BLDA CALC-SCNC: -0.8 MMOL/L (ref -9.6–2)
BASE EXCESS BLDA CALC-SCNC: -0.9 MMOL/L (ref -9.6–2)
BASE EXCESS BLDA CALC-SCNC: -1.7 MMOL/L (ref -9.6–2)
BASE EXCESS BLDA CALC-SCNC: -2 MMOL/L (ref -9.6–2)
BASE EXCESS BLDA CALC-SCNC: -2.4 MMOL/L (ref -9–1.8)
BASE EXCESS BLDA CALC-SCNC: -3.6 MMOL/L (ref -9–1.8)
BASE EXCESS BLDA CALC-SCNC: 0.4 MMOL/L (ref -9.6–2)
BASE EXCESS BLDA CALC-SCNC: 1 MMOL/L (ref -9.6–2)
BASE EXCESS BLDA CALC-SCNC: 1.4 MMOL/L (ref -9.6–2)
BASE EXCESS BLDA CALC-SCNC: 2.1 MMOL/L (ref -9.6–2)
BILIRUB SERPL-MCNC: 0.5 MG/DL
BUN SERPL-MCNC: 15.4 MG/DL (ref 8–23)
BUN SERPL-MCNC: 17.6 MG/DL (ref 8–23)
BUN SERPL-MCNC: 21.6 MG/DL (ref 8–23)
CA-I BLD-MCNC: 4 MG/DL (ref 4.4–5.2)
CA-I BLD-MCNC: 4.2 MG/DL (ref 4.4–5.2)
CA-I BLD-MCNC: 4.2 MG/DL (ref 4.4–5.2)
CA-I BLD-MCNC: 4.3 MG/DL (ref 4.4–5.2)
CA-I BLD-MCNC: 4.3 MG/DL (ref 4.4–5.2)
CA-I BLD-MCNC: 4.6 MG/DL (ref 4.4–5.2)
CA-I BLD-MCNC: 4.6 MG/DL (ref 4.4–5.2)
CA-I BLD-MCNC: 4.7 MG/DL (ref 4.4–5.2)
CA-I BLD-MCNC: 4.9 MG/DL (ref 4.4–5.2)
CA-I BLD-MCNC: 5.2 MG/DL (ref 4.4–5.2)
CA-I BLD-MCNC: 5.5 MG/DL (ref 4.4–5.2)
CA-I BLD-MCNC: 5.5 MG/DL (ref 4.4–5.2)
CA-I BLD-MCNC: 5.6 MG/DL (ref 4.4–5.2)
CALCIUM SERPL-MCNC: 6.9 MG/DL (ref 8.8–10.2)
CALCIUM SERPL-MCNC: 9.3 MG/DL (ref 8.8–10.2)
CALCIUM SERPL-MCNC: 9.4 MG/DL (ref 8.8–10.2)
CF REDUC 30M P MA P HEP LENFR BLD TEG: 1.3 % (ref 0–8)
CF REDUC 60M P MA LENFR BLD TEG: 3.8 % (ref 0–15)
CF REDUC 60M P MA LENFR BLD TEG: 5.5 % (ref 0–15)
CF REDUC 60M P MA P HEPASE LENFR BLD TEG: 4.3 % (ref 0–15)
CFT BLD TEG: 1 MINUTE (ref 1–3)
CFT BLD TEG: 1.1 MINUTE (ref 1–3)
CFT P HPASE BLD TEG: 1 MINUTE (ref 1–3)
CHLORIDE SERPL-SCNC: 107 MMOL/L (ref 98–107)
CHLORIDE SERPL-SCNC: 112 MMOL/L (ref 98–107)
CHLORIDE SERPL-SCNC: 99 MMOL/L (ref 98–107)
CI (COAGULATION INDEX)(Z) NON NATIVE: 2.9 (ref -3–3)
CI (COAGULATION INDEX)(Z) NON NATIVE: 3.3 (ref -3–3)
CI (COAGULATION INDEX)(Z): 3.7 (ref -3–3)
CLOT ANGLE BLD TEG: 74.6 DEGREES (ref 53–72)
CLOT ANGLE BLD TEG: 74.8 DEGREES (ref 53–72)
CLOT ANGLE P HPASE BLD TEG: 75.6 DEGREES (ref 53–72)
CLOT INIT BLD TEG: 3.9 MINUTE (ref 5–10)
CLOT INIT BLD TEG: 4.2 MINUTE (ref 5–10)
CLOT INIT P HPASE BLD TEG: 3.9 MINUTE (ref 5–10)
CLOT LYSIS 30M P MA LENFR BLD TEG: 0.9 % (ref 0–8)
CLOT LYSIS 30M P MA LENFR BLD TEG: 1.5 % (ref 0–8)
CLOT STRENGTH BLD TEG: 10.1 KD/SC (ref 4.5–11)
CLOT STRENGTH BLD TEG: 10.5 KD/SC (ref 4.5–11)
CLOT STRENGTH P HPASE BLD TEG: 12.4 KD/SC (ref 4.5–11)
CREAT SERPL-MCNC: 0.55 MG/DL (ref 0.51–0.95)
CREAT SERPL-MCNC: 0.66 MG/DL (ref 0.51–0.95)
CREAT SERPL-MCNC: 0.68 MG/DL (ref 0.51–0.95)
DEPRECATED HCO3 PLAS-SCNC: 20 MMOL/L (ref 22–29)
DEPRECATED HCO3 PLAS-SCNC: 23 MMOL/L (ref 22–29)
DEPRECATED HCO3 PLAS-SCNC: 26 MMOL/L (ref 22–29)
ERYTHROCYTE [DISTWIDTH] IN BLOOD BY AUTOMATED COUNT: 13.6 % (ref 10–15)
ERYTHROCYTE [DISTWIDTH] IN BLOOD BY AUTOMATED COUNT: 13.7 % (ref 10–15)
ERYTHROCYTE [DISTWIDTH] IN BLOOD BY AUTOMATED COUNT: 14 % (ref 10–15)
FIBRINOGEN PPP-MCNC: 208 MG/DL (ref 170–490)
FIBRINOGEN PPP-MCNC: 235 MG/DL (ref 170–490)
GFR SERPL CREATININE-BSD FRML MDRD: >90 ML/MIN/1.73M2
GLUCOSE BLD-MCNC: 109 MG/DL (ref 70–99)
GLUCOSE BLD-MCNC: 131 MG/DL (ref 70–99)
GLUCOSE BLD-MCNC: 132 MG/DL (ref 70–99)
GLUCOSE BLD-MCNC: 147 MG/DL (ref 70–99)
GLUCOSE BLD-MCNC: 157 MG/DL (ref 70–99)
GLUCOSE BLD-MCNC: 164 MG/DL (ref 70–99)
GLUCOSE BLD-MCNC: 171 MG/DL (ref 70–99)
GLUCOSE BLD-MCNC: 174 MG/DL (ref 70–99)
GLUCOSE BLD-MCNC: 186 MG/DL (ref 70–99)
GLUCOSE BLD-MCNC: 197 MG/DL (ref 70–99)
GLUCOSE BLD-MCNC: 200 MG/DL (ref 70–99)
GLUCOSE BLDC GLUCOMTR-MCNC: 112 MG/DL (ref 70–99)
GLUCOSE BLDC GLUCOMTR-MCNC: 141 MG/DL (ref 70–99)
GLUCOSE BLDC GLUCOMTR-MCNC: 168 MG/DL (ref 70–99)
GLUCOSE BLDC GLUCOMTR-MCNC: 198 MG/DL (ref 70–99)
GLUCOSE SERPL-MCNC: 109 MG/DL (ref 70–99)
GLUCOSE SERPL-MCNC: 142 MG/DL (ref 70–99)
GLUCOSE SERPL-MCNC: 185 MG/DL (ref 70–99)
HBV SURFACE AG SERPL QL IA: NONREACTIVE
HCO3 BLD-SCNC: 22 MMOL/L (ref 21–28)
HCO3 BLD-SCNC: 23 MMOL/L (ref 21–28)
HCO3 BLD-SCNC: 25 MMOL/L (ref 21–28)
HCO3 BLDA-SCNC: 24 MMOL/L (ref 21–28)
HCO3 BLDA-SCNC: 25 MMOL/L (ref 21–28)
HCO3 BLDA-SCNC: 26 MMOL/L (ref 21–28)
HCO3 BLDA-SCNC: 26 MMOL/L (ref 21–28)
HCO3 BLDA-SCNC: 27 MMOL/L (ref 21–28)
HCO3 BLDA-SCNC: 28 MMOL/L (ref 21–28)
HCT VFR BLD AUTO: 27.9 % (ref 35–47)
HCT VFR BLD AUTO: 29.7 % (ref 35–47)
HCT VFR BLD AUTO: 31.1 % (ref 35–47)
HGB BLD-MCNC: 10.1 G/DL (ref 11.7–15.7)
HGB BLD-MCNC: 10.3 G/DL (ref 11.7–15.7)
HGB BLD-MCNC: 11.8 G/DL (ref 11.7–15.7)
HGB BLD-MCNC: 12.1 G/DL (ref 11.7–15.7)
HGB BLD-MCNC: 12.4 G/DL (ref 11.7–15.7)
HGB BLD-MCNC: 8.1 G/DL (ref 11.7–15.7)
HGB BLD-MCNC: 8.8 G/DL (ref 11.7–15.7)
HGB BLD-MCNC: 9 G/DL (ref 11.7–15.7)
HGB BLD-MCNC: 9.1 G/DL (ref 11.7–15.7)
HGB BLD-MCNC: 9.3 G/DL (ref 11.7–15.7)
HGB BLD-MCNC: 9.4 G/DL (ref 11.7–15.7)
HGB BLD-MCNC: 9.4 G/DL (ref 11.7–15.7)
HGB BLD-MCNC: 9.5 G/DL (ref 11.7–15.7)
HGB BLD-MCNC: 9.6 G/DL (ref 11.7–15.7)
HGB BLD-MCNC: 9.8 G/DL (ref 11.7–15.7)
HIV 1+2 AB+HIV1 P24 AG SERPL QL IA: NONREACTIVE
HIV 1+2 AB+HIV1P24 AG SERPLBLD IA.RAPID: NON REACTIVE
HIV 1+2 AB+HIV1P24 AG SERPLBLD IA.RAPID: NON REACTIVE
HIV INTERPRETATION: NORMAL
HOLD SPECIMEN: NORMAL
INR PPP: 1.3 (ref 0.85–1.15)
INR PPP: 1.41 (ref 0.85–1.15)
INR PPP: 1.48 (ref 0.85–1.15)
LACTATE BLD-SCNC: 0.6 MMOL/L
LACTATE BLD-SCNC: 0.6 MMOL/L
LACTATE BLD-SCNC: 1 MMOL/L
LACTATE BLD-SCNC: 1 MMOL/L
LACTATE BLD-SCNC: 1.1 MMOL/L
LACTATE BLD-SCNC: 1.2 MMOL/L
LACTATE BLD-SCNC: 1.4 MMOL/L
LACTATE BLD-SCNC: 1.4 MMOL/L
LACTATE BLD-SCNC: 1.5 MMOL/L
LACTATE BLD-SCNC: 1.7 MMOL/L
LACTATE BLD-SCNC: 1.8 MMOL/L
LACTATE SERPL-SCNC: 1.2 MMOL/L (ref 0.7–2)
LACTATE SERPL-SCNC: 1.7 MMOL/L (ref 0.7–2)
MAGNESIUM SERPL-MCNC: 1.8 MG/DL (ref 1.7–2.3)
MAGNESIUM SERPL-MCNC: 2.7 MG/DL (ref 1.7–2.3)
MCF BLD TEG: 66.9 MM (ref 50–70)
MCF BLD TEG: 67.8 MM (ref 50–70)
MCF P HPASE BLD TEG: 71.3 MM (ref 50–70)
MCH RBC QN AUTO: 29.4 PG (ref 26.5–33)
MCH RBC QN AUTO: 29.9 PG (ref 26.5–33)
MCH RBC QN AUTO: 30.1 PG (ref 26.5–33)
MCHC RBC AUTO-ENTMCNC: 32.3 G/DL (ref 31.5–36.5)
MCHC RBC AUTO-ENTMCNC: 32.3 G/DL (ref 31.5–36.5)
MCHC RBC AUTO-ENTMCNC: 33.1 G/DL (ref 31.5–36.5)
MCV RBC AUTO: 90 FL (ref 78–100)
MCV RBC AUTO: 91 FL (ref 78–100)
MCV RBC AUTO: 93 FL (ref 78–100)
MRSA DNA SPEC QL NAA+PROBE: NEGATIVE
O2/TOTAL GAS SETTING VFR VENT: 100 %
O2/TOTAL GAS SETTING VFR VENT: 3 %
O2/TOTAL GAS SETTING VFR VENT: 40 %
O2/TOTAL GAS SETTING VFR VENT: 53 %
O2/TOTAL GAS SETTING VFR VENT: 60 %
O2/TOTAL GAS SETTING VFR VENT: 78 %
O2/TOTAL GAS SETTING VFR VENT: 80 %
O2/TOTAL GAS SETTING VFR VENT: 85 %
OXYHGB MFR BLD: 94 % (ref 92–100)
OXYHGB MFR BLD: 96 % (ref 92–100)
OXYHGB MFR BLD: 96 % (ref 92–100)
OXYHGB MFR BLDA: 83 % (ref 92–100)
OXYHGB MFR BLDA: 97 % (ref 92–100)
OXYHGB MFR BLDA: 97 % (ref 92–100)
OXYHGB MFR BLDA: 98 % (ref 92–100)
PCO2 BLD: 42 MM HG (ref 35–45)
PCO2 BLD: 43 MM HG (ref 35–45)
PCO2 BLD: 44 MM HG (ref 35–45)
PCO2 BLDA: 39 MM HG (ref 35–45)
PCO2 BLDA: 40 MM HG (ref 35–45)
PCO2 BLDA: 41 MM HG (ref 35–45)
PCO2 BLDA: 44 MM HG (ref 35–45)
PCO2 BLDA: 45 MM HG (ref 35–45)
PCO2 BLDA: 46 MM HG (ref 35–45)
PCO2 BLDA: 46 MM HG (ref 35–45)
PCO2 BLDA: 47 MM HG (ref 35–45)
PCO2 BLDA: 51 MM HG (ref 35–45)
PH BLD: 7.32 [PH] (ref 7.35–7.45)
PH BLD: 7.34 [PH] (ref 7.35–7.45)
PH BLD: 7.38 [PH] (ref 7.35–7.45)
PH BLDA: 7.33 [PH] (ref 7.35–7.45)
PH BLDA: 7.33 [PH] (ref 7.35–7.45)
PH BLDA: 7.35 [PH] (ref 7.35–7.45)
PH BLDA: 7.35 [PH] (ref 7.35–7.45)
PH BLDA: 7.36 [PH] (ref 7.35–7.45)
PH BLDA: 7.37 [PH] (ref 7.35–7.45)
PH BLDA: 7.38 [PH] (ref 7.35–7.45)
PH BLDA: 7.39 [PH] (ref 7.35–7.45)
PH BLDA: 7.42 [PH] (ref 7.35–7.45)
PHOSPHATE SERPL-MCNC: 4 MG/DL (ref 2.5–4.5)
PHOSPHATE SERPL-MCNC: 4.7 MG/DL (ref 2.5–4.5)
PLATELET # BLD AUTO: 163 10E3/UL (ref 150–450)
PLATELET # BLD AUTO: 171 10E3/UL (ref 150–450)
PLATELET # BLD AUTO: 199 10E3/UL (ref 150–450)
PO2 BLD: 101 MM HG (ref 80–105)
PO2 BLD: 108 MM HG (ref 80–105)
PO2 BLD: 83 MM HG (ref 80–105)
PO2 BLDA: 141 MM HG (ref 80–105)
PO2 BLDA: 191 MM HG (ref 80–105)
PO2 BLDA: 227 MM HG (ref 80–105)
PO2 BLDA: 296 MM HG (ref 80–105)
PO2 BLDA: 411 MM HG (ref 80–105)
PO2 BLDA: 438 MM HG (ref 80–105)
PO2 BLDA: 445 MM HG (ref 80–105)
PO2 BLDA: 474 MM HG (ref 80–105)
PO2 BLDA: 482 MM HG (ref 80–105)
PO2 BLDA: 52 MM HG (ref 80–105)
PO2 BLDA: 82 MM HG (ref 80–105)
POTASSIUM BLD-SCNC: 4 MMOL/L (ref 3.5–5)
POTASSIUM BLD-SCNC: 4.1 MMOL/L (ref 3.5–5)
POTASSIUM BLD-SCNC: 4.2 MMOL/L (ref 3.5–5)
POTASSIUM BLD-SCNC: 4.3 MMOL/L (ref 3.5–5)
POTASSIUM BLD-SCNC: 4.4 MMOL/L (ref 3.5–5)
POTASSIUM BLD-SCNC: 4.4 MMOL/L (ref 3.5–5)
POTASSIUM BLD-SCNC: 4.6 MMOL/L (ref 3.5–5)
POTASSIUM BLD-SCNC: 4.8 MMOL/L (ref 3.5–5)
POTASSIUM BLD-SCNC: 4.8 MMOL/L (ref 3.5–5)
POTASSIUM BLD-SCNC: 5.3 MMOL/L (ref 3.5–5)
POTASSIUM BLD-SCNC: 5.4 MMOL/L (ref 3.5–5)
POTASSIUM SERPL-SCNC: 3.8 MMOL/L (ref 3.4–5.3)
POTASSIUM SERPL-SCNC: 4.5 MMOL/L (ref 3.4–5.3)
POTASSIUM SERPL-SCNC: 4.5 MMOL/L (ref 3.4–5.3)
PROT SERPL-MCNC: 3.9 G/DL (ref 6.4–8.3)
RBC # BLD AUTO: 2.99 10E6/UL (ref 3.8–5.2)
RBC # BLD AUTO: 3.27 10E6/UL (ref 3.8–5.2)
RBC # BLD AUTO: 3.45 10E6/UL (ref 3.8–5.2)
SA TARGET DNA: NEGATIVE
SODIUM BLD-SCNC: 135 MMOL/L (ref 133–144)
SODIUM BLD-SCNC: 135 MMOL/L (ref 133–144)
SODIUM BLD-SCNC: 136 MMOL/L (ref 133–144)
SODIUM BLD-SCNC: 137 MMOL/L (ref 133–144)
SODIUM SERPL-SCNC: 133 MMOL/L (ref 136–145)
SODIUM SERPL-SCNC: 139 MMOL/L (ref 136–145)
SODIUM SERPL-SCNC: 141 MMOL/L (ref 136–145)
SPECIMEN EXPIRATION DATE: NORMAL
WBC # BLD AUTO: 15.6 10E3/UL (ref 4–11)
WBC # BLD AUTO: 16.2 10E3/UL (ref 4–11)
WBC # BLD AUTO: 17.9 10E3/UL (ref 4–11)

## 2022-09-14 PROCEDURE — 93010 ELECTROCARDIOGRAM REPORT: CPT | Mod: 59 | Performed by: INTERNAL MEDICINE

## 2022-09-14 PROCEDURE — 272N000085 HC PACK CELL SAVER CSP: Performed by: SURGERY

## 2022-09-14 PROCEDURE — 999N000141 HC STATISTIC PRE-PROCEDURE NURSING ASSESSMENT: Performed by: SURGERY

## 2022-09-14 PROCEDURE — 250N000013 HC RX MED GY IP 250 OP 250 PS 637: Performed by: SURGERY

## 2022-09-14 PROCEDURE — 272N000004 HC RX 272: Performed by: SURGERY

## 2022-09-14 PROCEDURE — 200N000002 HC R&B ICU UMMC

## 2022-09-14 PROCEDURE — 85730 THROMBOPLASTIN TIME PARTIAL: CPT | Performed by: SURGERY

## 2022-09-14 PROCEDURE — 250N000011 HC RX IP 250 OP 636: Performed by: STUDENT IN AN ORGANIZED HEALTH CARE EDUCATION/TRAINING PROGRAM

## 2022-09-14 PROCEDURE — 258N000003 HC RX IP 258 OP 636: Performed by: SURGERY

## 2022-09-14 PROCEDURE — 84132 ASSAY OF SERUM POTASSIUM: CPT

## 2022-09-14 PROCEDURE — 83605 ASSAY OF LACTIC ACID: CPT | Performed by: SURGERY

## 2022-09-14 PROCEDURE — 250N000011 HC RX IP 250 OP 636: Performed by: SURGERY

## 2022-09-14 PROCEDURE — 410N000004: Performed by: SURGERY

## 2022-09-14 PROCEDURE — 250N000009 HC RX 250: Performed by: SURGERY

## 2022-09-14 PROCEDURE — 82330 ASSAY OF CALCIUM: CPT | Performed by: SURGERY

## 2022-09-14 PROCEDURE — 94640 AIRWAY INHALATION TREATMENT: CPT

## 2022-09-14 PROCEDURE — 85610 PROTHROMBIN TIME: CPT | Performed by: SURGERY

## 2022-09-14 PROCEDURE — 94002 VENT MGMT INPAT INIT DAY: CPT

## 2022-09-14 PROCEDURE — 360N000079 HC SURGERY LEVEL 6, PER MIN: Performed by: SURGERY

## 2022-09-14 PROCEDURE — 85018 HEMOGLOBIN: CPT | Performed by: SURGERY

## 2022-09-14 PROCEDURE — 410N000003 HC PER-PERFUSION 1ST 30 MIN: Performed by: SURGERY

## 2022-09-14 PROCEDURE — 33533 CABG ARTERIAL SINGLE: CPT | Mod: GC | Performed by: SURGERY

## 2022-09-14 PROCEDURE — 85396 CLOTTING ASSAY WHOLE BLOOD: CPT | Performed by: SURGERY

## 2022-09-14 PROCEDURE — 82805 BLOOD GASES W/O2 SATURATION: CPT | Performed by: SURGERY

## 2022-09-14 PROCEDURE — 272N000001 HC OR GENERAL SUPPLY STERILE: Performed by: SURGERY

## 2022-09-14 PROCEDURE — 250N000013 HC RX MED GY IP 250 OP 250 PS 637

## 2022-09-14 PROCEDURE — 999N000104 HIV RAPID ANTIBODY SCREEN: Performed by: INTERNAL MEDICINE

## 2022-09-14 PROCEDURE — 86850 RBC ANTIBODY SCREEN: CPT | Performed by: STUDENT IN AN ORGANIZED HEALTH CARE EDUCATION/TRAINING PROGRAM

## 2022-09-14 PROCEDURE — 250N000012 HC RX MED GY IP 250 OP 636 PS 637: Performed by: SURGERY

## 2022-09-14 PROCEDURE — 272N000088 HC PUMP APP ADULT PERFUSION: Performed by: SURGERY

## 2022-09-14 PROCEDURE — 84100 ASSAY OF PHOSPHORUS: CPT | Performed by: SURGERY

## 2022-09-14 PROCEDURE — 999N000065 XR CHEST PORT 1 VIEW

## 2022-09-14 PROCEDURE — 250N000024 HC ISOFLURANE, PER MIN: Performed by: SURGERY

## 2022-09-14 PROCEDURE — 33521 CABG ARTERY-VEIN FOUR: CPT | Mod: GC | Performed by: SURGERY

## 2022-09-14 PROCEDURE — 84132 ASSAY OF SERUM POTASSIUM: CPT | Performed by: SURGERY

## 2022-09-14 PROCEDURE — C1898 LEAD, PMKR, OTHER THAN TRANS: HCPCS | Performed by: SURGERY

## 2022-09-14 PROCEDURE — 250N000009 HC RX 250: Performed by: STUDENT IN AN ORGANIZED HEALTH CARE EDUCATION/TRAINING PROGRAM

## 2022-09-14 PROCEDURE — 85027 COMPLETE CBC AUTOMATED: CPT | Performed by: SURGERY

## 2022-09-14 PROCEDURE — 83735 ASSAY OF MAGNESIUM: CPT | Performed by: SURGERY

## 2022-09-14 PROCEDURE — 258N000003 HC RX IP 258 OP 636: Performed by: STUDENT IN AN ORGANIZED HEALTH CARE EDUCATION/TRAINING PROGRAM

## 2022-09-14 PROCEDURE — 85384 FIBRINOGEN ACTIVITY: CPT | Performed by: SURGERY

## 2022-09-14 PROCEDURE — 82330 ASSAY OF CALCIUM: CPT

## 2022-09-14 PROCEDURE — 999N000253 HC STATISTIC WEANING TRIALS

## 2022-09-14 PROCEDURE — 86901 BLOOD TYPING SEROLOGIC RH(D): CPT | Performed by: STUDENT IN AN ORGANIZED HEALTH CARE EDUCATION/TRAINING PROGRAM

## 2022-09-14 PROCEDURE — 250N000011 HC RX IP 250 OP 636: Performed by: THORACIC SURGERY (CARDIOTHORACIC VASCULAR SURGERY)

## 2022-09-14 PROCEDURE — 999N000104 HIV RAPID ANTIBODY SCREEN: Performed by: SURGERY

## 2022-09-14 PROCEDURE — 99291 CRITICAL CARE FIRST HOUR: CPT | Mod: 24 | Performed by: INTERNAL MEDICINE

## 2022-09-14 PROCEDURE — 85027 COMPLETE CBC AUTOMATED: CPT | Performed by: ANESTHESIOLOGY

## 2022-09-14 PROCEDURE — 82810 BLOOD GASES O2 SAT ONLY: CPT

## 2022-09-14 PROCEDURE — 370N000017 HC ANESTHESIA TECHNICAL FEE, PER MIN: Performed by: SURGERY

## 2022-09-14 PROCEDURE — 80053 COMPREHEN METABOLIC PANEL: CPT | Performed by: STUDENT IN AN ORGANIZED HEALTH CARE EDUCATION/TRAINING PROGRAM

## 2022-09-14 PROCEDURE — 82803 BLOOD GASES ANY COMBINATION: CPT

## 2022-09-14 PROCEDURE — 999N000157 HC STATISTIC RCP TIME EA 10 MIN

## 2022-09-14 PROCEDURE — 36415 COLL VENOUS BLD VENIPUNCTURE: CPT | Performed by: SURGERY

## 2022-09-14 PROCEDURE — 33508 ENDOSCOPIC VEIN HARVEST: CPT | Mod: XU | Performed by: SURGERY

## 2022-09-14 PROCEDURE — 93005 ELECTROCARDIOGRAM TRACING: CPT

## 2022-09-14 PROCEDURE — 87641 MR-STAPH DNA AMP PROBE: CPT | Performed by: SURGERY

## 2022-09-14 PROCEDURE — 71045 X-RAY EXAM CHEST 1 VIEW: CPT | Mod: 26 | Performed by: RADIOLOGY

## 2022-09-14 PROCEDURE — 271N000002 HC RX 271: Performed by: STUDENT IN AN ORGANIZED HEALTH CARE EDUCATION/TRAINING PROGRAM

## 2022-09-14 RX ORDER — POLYETHYLENE GLYCOL 3350 17 G/17G
17 POWDER, FOR SOLUTION ORAL DAILY
Status: DISCONTINUED | OUTPATIENT
Start: 2022-09-15 | End: 2022-09-16

## 2022-09-14 RX ORDER — NALOXONE HYDROCHLORIDE 0.4 MG/ML
0.4 INJECTION, SOLUTION INTRAMUSCULAR; INTRAVENOUS; SUBCUTANEOUS
Status: DISCONTINUED | OUTPATIENT
Start: 2022-09-14 | End: 2022-09-19 | Stop reason: HOSPADM

## 2022-09-14 RX ORDER — HEPARIN SODIUM 5000 [USP'U]/.5ML
5000 INJECTION, SOLUTION INTRAVENOUS; SUBCUTANEOUS EVERY 8 HOURS
Status: DISCONTINUED | OUTPATIENT
Start: 2022-09-15 | End: 2022-09-19 | Stop reason: HOSPADM

## 2022-09-14 RX ORDER — HYDROMORPHONE HCL IN WATER/PF 6 MG/30 ML
.2-.4 PATIENT CONTROLLED ANALGESIA SYRINGE INTRAVENOUS
Status: DISCONTINUED | OUTPATIENT
Start: 2022-09-14 | End: 2022-09-14

## 2022-09-14 RX ORDER — ACETAMINOPHEN 325 MG/1
975 TABLET ORAL EVERY 8 HOURS
Status: DISPENSED | OUTPATIENT
Start: 2022-09-14 | End: 2022-09-17

## 2022-09-14 RX ORDER — GABAPENTIN 100 MG/1
100 CAPSULE ORAL
Status: COMPLETED | OUTPATIENT
Start: 2022-09-14 | End: 2022-09-14

## 2022-09-14 RX ORDER — LIDOCAINE 40 MG/G
CREAM TOPICAL
Status: DISCONTINUED | OUTPATIENT
Start: 2022-09-14 | End: 2022-09-14 | Stop reason: HOSPADM

## 2022-09-14 RX ORDER — ACETAMINOPHEN 325 MG/1
650 TABLET ORAL EVERY 4 HOURS PRN
Status: DISCONTINUED | OUTPATIENT
Start: 2022-09-17 | End: 2022-09-16

## 2022-09-14 RX ORDER — HEPARIN SODIUM 1000 [USP'U]/ML
INJECTION, SOLUTION INTRAVENOUS; SUBCUTANEOUS PRN
Status: DISCONTINUED | OUTPATIENT
Start: 2022-09-14 | End: 2022-09-14

## 2022-09-14 RX ORDER — NOREPINEPHRINE BITARTRATE 0.06 MG/ML
.01-.6 INJECTION, SOLUTION INTRAVENOUS CONTINUOUS
Status: DISCONTINUED | OUTPATIENT
Start: 2022-09-14 | End: 2022-09-15

## 2022-09-14 RX ORDER — CALCIUM GLUCONATE 20 MG/ML
1 INJECTION, SOLUTION INTRAVENOUS
Status: DISCONTINUED | OUTPATIENT
Start: 2022-09-14 | End: 2022-09-16

## 2022-09-14 RX ORDER — SODIUM CHLORIDE, SODIUM LACTATE, POTASSIUM CHLORIDE, CALCIUM CHLORIDE 600; 310; 30; 20 MG/100ML; MG/100ML; MG/100ML; MG/100ML
INJECTION, SOLUTION INTRAVENOUS CONTINUOUS
Status: DISCONTINUED | OUTPATIENT
Start: 2022-09-14 | End: 2022-09-14 | Stop reason: HOSPADM

## 2022-09-14 RX ORDER — METHOCARBAMOL 500 MG/1
500 TABLET, FILM COATED ORAL 4 TIMES DAILY
Status: DISCONTINUED | OUTPATIENT
Start: 2022-09-14 | End: 2022-09-15

## 2022-09-14 RX ORDER — GABAPENTIN 100 MG/1
100 CAPSULE ORAL AT BEDTIME
Status: DISCONTINUED | OUTPATIENT
Start: 2022-09-15 | End: 2022-09-14

## 2022-09-14 RX ORDER — IPRATROPIUM BROMIDE AND ALBUTEROL SULFATE 2.5; .5 MG/3ML; MG/3ML
3 SOLUTION RESPIRATORY (INHALATION)
Status: DISCONTINUED | OUTPATIENT
Start: 2022-09-14 | End: 2022-09-14

## 2022-09-14 RX ORDER — PROPOFOL 10 MG/ML
5-75 INJECTION, EMULSION INTRAVENOUS CONTINUOUS
Status: DISCONTINUED | OUTPATIENT
Start: 2022-09-14 | End: 2022-09-15

## 2022-09-14 RX ORDER — CHLORHEXIDINE GLUCONATE ORAL RINSE 1.2 MG/ML
10 SOLUTION DENTAL ONCE
Status: COMPLETED | OUTPATIENT
Start: 2022-09-14 | End: 2022-09-14

## 2022-09-14 RX ORDER — HYDRALAZINE HYDROCHLORIDE 20 MG/ML
10 INJECTION INTRAMUSCULAR; INTRAVENOUS EVERY 30 MIN PRN
Status: DISCONTINUED | OUTPATIENT
Start: 2022-09-14 | End: 2022-09-19 | Stop reason: HOSPADM

## 2022-09-14 RX ORDER — LIDOCAINE 40 MG/G
CREAM TOPICAL
Status: DISCONTINUED | OUTPATIENT
Start: 2022-09-14 | End: 2022-09-19 | Stop reason: HOSPADM

## 2022-09-14 RX ORDER — PROCHLORPERAZINE MALEATE 5 MG
5 TABLET ORAL EVERY 6 HOURS PRN
Status: DISCONTINUED | OUTPATIENT
Start: 2022-09-14 | End: 2022-09-19 | Stop reason: HOSPADM

## 2022-09-14 RX ORDER — NALOXONE HYDROCHLORIDE 0.4 MG/ML
0.2 INJECTION, SOLUTION INTRAMUSCULAR; INTRAVENOUS; SUBCUTANEOUS
Status: DISCONTINUED | OUTPATIENT
Start: 2022-09-14 | End: 2022-09-19 | Stop reason: HOSPADM

## 2022-09-14 RX ORDER — GABAPENTIN 100 MG/1
200 CAPSULE ORAL AT BEDTIME
Status: DISCONTINUED | OUTPATIENT
Start: 2022-09-15 | End: 2022-09-19 | Stop reason: HOSPADM

## 2022-09-14 RX ORDER — KETOROLAC TROMETHAMINE 15 MG/ML
15 INJECTION, SOLUTION INTRAMUSCULAR; INTRAVENOUS EVERY 6 HOURS PRN
Status: DISCONTINUED | OUTPATIENT
Start: 2022-09-14 | End: 2022-09-16

## 2022-09-14 RX ORDER — PROPOFOL 10 MG/ML
INJECTION, EMULSION INTRAVENOUS PRN
Status: DISCONTINUED | OUTPATIENT
Start: 2022-09-14 | End: 2022-09-14

## 2022-09-14 RX ORDER — ACETAMINOPHEN 325 MG/1
975 TABLET ORAL ONCE
Status: COMPLETED | OUTPATIENT
Start: 2022-09-14 | End: 2022-09-14

## 2022-09-14 RX ORDER — ASPIRIN 81 MG/1
81 TABLET, CHEWABLE ORAL
Status: COMPLETED | OUTPATIENT
Start: 2022-09-14 | End: 2022-09-14

## 2022-09-14 RX ORDER — FAMOTIDINE 20 MG/1
20 TABLET, FILM COATED ORAL
Status: COMPLETED | OUTPATIENT
Start: 2022-09-14 | End: 2022-09-14

## 2022-09-14 RX ORDER — SODIUM CHLORIDE, SODIUM GLUCONATE, SODIUM ACETATE, POTASSIUM CHLORIDE AND MAGNESIUM CHLORIDE 526; 502; 368; 37; 30 MG/100ML; MG/100ML; MG/100ML; MG/100ML; MG/100ML
INJECTION, SOLUTION INTRAVENOUS CONTINUOUS PRN
Status: DISCONTINUED | OUTPATIENT
Start: 2022-09-14 | End: 2022-09-14

## 2022-09-14 RX ORDER — CEFAZOLIN SODIUM 2 G/100ML
2 INJECTION, SOLUTION INTRAVENOUS EVERY 8 HOURS
Status: COMPLETED | OUTPATIENT
Start: 2022-09-14 | End: 2022-09-15

## 2022-09-14 RX ORDER — MUPIROCIN 20 MG/G
0.5 OINTMENT TOPICAL 2 TIMES DAILY
Status: DISCONTINUED | OUTPATIENT
Start: 2022-09-14 | End: 2022-09-19 | Stop reason: HOSPADM

## 2022-09-14 RX ORDER — CYCLOBENZAPRINE HCL 5 MG
10 TABLET ORAL ONCE
Status: COMPLETED | OUTPATIENT
Start: 2022-09-14 | End: 2022-09-14

## 2022-09-14 RX ORDER — LIDOCAINE 4 G/G
2 PATCH TOPICAL
Status: DISCONTINUED | OUTPATIENT
Start: 2022-09-14 | End: 2022-09-19 | Stop reason: HOSPADM

## 2022-09-14 RX ORDER — DEXMEDETOMIDINE HYDROCHLORIDE 4 UG/ML
0.2-1.2 INJECTION, SOLUTION INTRAVENOUS CONTINUOUS
Status: DISCONTINUED | OUTPATIENT
Start: 2022-09-14 | End: 2022-09-14 | Stop reason: HOSPADM

## 2022-09-14 RX ORDER — DEXMEDETOMIDINE HYDROCHLORIDE 4 UG/ML
.2-.7 INJECTION, SOLUTION INTRAVENOUS CONTINUOUS
Status: DISCONTINUED | OUTPATIENT
Start: 2022-09-14 | End: 2022-09-15

## 2022-09-14 RX ORDER — OXYCODONE HYDROCHLORIDE 5 MG/1
5 TABLET ORAL EVERY 4 HOURS PRN
Status: DISCONTINUED | OUTPATIENT
Start: 2022-09-14 | End: 2022-09-16

## 2022-09-14 RX ORDER — ASPIRIN 81 MG/1
162 TABLET, CHEWABLE ORAL
Status: COMPLETED | OUTPATIENT
Start: 2022-09-14 | End: 2022-09-14

## 2022-09-14 RX ORDER — FLUMAZENIL 0.1 MG/ML
0.2 INJECTION, SOLUTION INTRAVENOUS
Status: DISCONTINUED | OUTPATIENT
Start: 2022-09-14 | End: 2022-09-14 | Stop reason: HOSPADM

## 2022-09-14 RX ORDER — DEXTROSE MONOHYDRATE 25 G/50ML
25-50 INJECTION, SOLUTION INTRAVENOUS
Status: DISCONTINUED | OUTPATIENT
Start: 2022-09-14 | End: 2022-09-17 | Stop reason: CLARIF

## 2022-09-14 RX ORDER — HYDROMORPHONE HCL IN WATER/PF 6 MG/30 ML
0.2 PATIENT CONTROLLED ANALGESIA SYRINGE INTRAVENOUS
Status: DISCONTINUED | OUTPATIENT
Start: 2022-09-14 | End: 2022-09-19

## 2022-09-14 RX ORDER — BISACODYL 10 MG
10 SUPPOSITORY, RECTAL RECTAL DAILY PRN
Status: DISCONTINUED | OUTPATIENT
Start: 2022-09-14 | End: 2022-09-19 | Stop reason: HOSPADM

## 2022-09-14 RX ORDER — PANTOPRAZOLE SODIUM 40 MG/1
40 TABLET, DELAYED RELEASE ORAL DAILY
Status: DISCONTINUED | OUTPATIENT
Start: 2022-09-15 | End: 2022-09-19 | Stop reason: HOSPADM

## 2022-09-14 RX ORDER — METHOCARBAMOL 500 MG/1
250 TABLET ORAL EVERY 6 HOURS PRN
Status: DISCONTINUED | OUTPATIENT
Start: 2022-09-14 | End: 2022-09-15

## 2022-09-14 RX ORDER — SODIUM CHLORIDE, SODIUM LACTATE, POTASSIUM CHLORIDE, CALCIUM CHLORIDE 600; 310; 30; 20 MG/100ML; MG/100ML; MG/100ML; MG/100ML
INJECTION, SOLUTION INTRAVENOUS CONTINUOUS PRN
Status: DISCONTINUED | OUTPATIENT
Start: 2022-09-14 | End: 2022-09-14

## 2022-09-14 RX ORDER — HYDROMORPHONE HCL IN WATER/PF 6 MG/30 ML
0.4 PATIENT CONTROLLED ANALGESIA SYRINGE INTRAVENOUS
Status: DISCONTINUED | OUTPATIENT
Start: 2022-09-14 | End: 2022-09-19

## 2022-09-14 RX ORDER — FENTANYL CITRATE 50 UG/ML
25-50 INJECTION, SOLUTION INTRAMUSCULAR; INTRAVENOUS
Status: DISCONTINUED | OUTPATIENT
Start: 2022-09-14 | End: 2022-09-14 | Stop reason: HOSPADM

## 2022-09-14 RX ORDER — IPRATROPIUM BROMIDE AND ALBUTEROL SULFATE 2.5; .5 MG/3ML; MG/3ML
3 SOLUTION RESPIRATORY (INHALATION) EVERY 4 HOURS PRN
Status: DISCONTINUED | OUTPATIENT
Start: 2022-09-14 | End: 2022-09-19 | Stop reason: HOSPADM

## 2022-09-14 RX ORDER — MAGNESIUM SULFATE HEPTAHYDRATE 40 MG/ML
2 INJECTION, SOLUTION INTRAVENOUS ONCE
Status: COMPLETED | OUTPATIENT
Start: 2022-09-14 | End: 2022-09-15

## 2022-09-14 RX ORDER — ONDANSETRON 2 MG/ML
4 INJECTION INTRAMUSCULAR; INTRAVENOUS EVERY 6 HOURS PRN
Status: DISCONTINUED | OUTPATIENT
Start: 2022-09-14 | End: 2022-09-19 | Stop reason: HOSPADM

## 2022-09-14 RX ORDER — NOREPINEPHRINE BITARTRATE 0.06 MG/ML
.01-.1 INJECTION, SOLUTION INTRAVENOUS CONTINUOUS
Status: DISCONTINUED | OUTPATIENT
Start: 2022-09-14 | End: 2022-09-14 | Stop reason: HOSPADM

## 2022-09-14 RX ORDER — AMOXICILLIN 250 MG
1 CAPSULE ORAL 2 TIMES DAILY
Status: DISCONTINUED | OUTPATIENT
Start: 2022-09-14 | End: 2022-09-19 | Stop reason: HOSPADM

## 2022-09-14 RX ORDER — CEFAZOLIN SODIUM/WATER 2 G/20 ML
2 SYRINGE (ML) INTRAVENOUS SEE ADMIN INSTRUCTIONS
Status: DISCONTINUED | OUTPATIENT
Start: 2022-09-14 | End: 2022-09-14 | Stop reason: HOSPADM

## 2022-09-14 RX ORDER — LIDOCAINE HYDROCHLORIDE 20 MG/ML
INJECTION, SOLUTION INFILTRATION; PERINEURAL PRN
Status: DISCONTINUED | OUTPATIENT
Start: 2022-09-14 | End: 2022-09-14

## 2022-09-14 RX ORDER — ONDANSETRON 4 MG/1
4 TABLET, ORALLY DISINTEGRATING ORAL EVERY 6 HOURS PRN
Status: DISCONTINUED | OUTPATIENT
Start: 2022-09-14 | End: 2022-09-19 | Stop reason: HOSPADM

## 2022-09-14 RX ORDER — PHENYLEPHRINE HCL IN 0.9% NACL 50MG/250ML
.1-6 PLASTIC BAG, INJECTION (ML) INTRAVENOUS CONTINUOUS
Status: DISCONTINUED | OUTPATIENT
Start: 2022-09-14 | End: 2022-09-14 | Stop reason: HOSPADM

## 2022-09-14 RX ORDER — ASPIRIN 81 MG/1
162 TABLET, CHEWABLE ORAL DAILY
Status: DISCONTINUED | OUTPATIENT
Start: 2022-09-15 | End: 2022-09-19 | Stop reason: HOSPADM

## 2022-09-14 RX ORDER — FENTANYL CITRATE 50 UG/ML
INJECTION, SOLUTION INTRAMUSCULAR; INTRAVENOUS PRN
Status: DISCONTINUED | OUTPATIENT
Start: 2022-09-14 | End: 2022-09-14

## 2022-09-14 RX ORDER — POTASSIUM CHLORIDE 29.8 MG/ML
20 INJECTION INTRAVENOUS ONCE
Status: COMPLETED | OUTPATIENT
Start: 2022-09-14 | End: 2022-09-15

## 2022-09-14 RX ORDER — EPHEDRINE SULFATE 50 MG/ML
INJECTION, SOLUTION INTRAMUSCULAR; INTRAVENOUS; SUBCUTANEOUS PRN
Status: DISCONTINUED | OUTPATIENT
Start: 2022-09-14 | End: 2022-09-14

## 2022-09-14 RX ORDER — CALCIUM GLUCONATE 20 MG/ML
2 INJECTION, SOLUTION INTRAVENOUS
Status: DISCONTINUED | OUTPATIENT
Start: 2022-09-14 | End: 2022-09-16

## 2022-09-14 RX ORDER — CEFAZOLIN SODIUM/WATER 2 G/20 ML
2 SYRINGE (ML) INTRAVENOUS
Status: COMPLETED | OUTPATIENT
Start: 2022-09-14 | End: 2022-09-14

## 2022-09-14 RX ORDER — NICOTINE POLACRILEX 4 MG
15-30 LOZENGE BUCCAL
Status: DISCONTINUED | OUTPATIENT
Start: 2022-09-14 | End: 2022-09-17 | Stop reason: CLARIF

## 2022-09-14 RX ADMIN — SODIUM CHLORIDE, POTASSIUM CHLORIDE, SODIUM LACTATE AND CALCIUM CHLORIDE: 600; 310; 30; 20 INJECTION, SOLUTION INTRAVENOUS at 07:00

## 2022-09-14 RX ADMIN — Medication 7 ML/HR: at 12:44

## 2022-09-14 RX ADMIN — HEPARIN SODIUM 28000 UNITS: 1000 INJECTION INTRAVENOUS; SUBCUTANEOUS at 11:12

## 2022-09-14 RX ADMIN — FAMOTIDINE 20 MG: 20 TABLET ORAL at 06:36

## 2022-09-14 RX ADMIN — Medication 5 MG: at 08:27

## 2022-09-14 RX ADMIN — MUPIROCIN 0.5 G: 20 OINTMENT TOPICAL at 22:27

## 2022-09-14 RX ADMIN — SENNOSIDES AND DOCUSATE SODIUM 1 TABLET: 50; 8.6 TABLET ORAL at 19:45

## 2022-09-14 RX ADMIN — IPRATROPIUM BROMIDE AND ALBUTEROL SULFATE 3 ML: .5; 3 SOLUTION RESPIRATORY (INHALATION) at 17:32

## 2022-09-14 RX ADMIN — HYDROMORPHONE HYDROCHLORIDE 0.4 MG: 0.2 INJECTION, SOLUTION INTRAMUSCULAR; INTRAVENOUS; SUBCUTANEOUS at 16:25

## 2022-09-14 RX ADMIN — HUMAN INSULIN 1 UNITS/HR: 100 INJECTION, SOLUTION SUBCUTANEOUS at 12:25

## 2022-09-14 RX ADMIN — CEFAZOLIN SODIUM 2 G: 2 INJECTION, SOLUTION INTRAVENOUS at 19:55

## 2022-09-14 RX ADMIN — POTASSIUM CHLORIDE 20 MEQ: 29.8 INJECTION, SOLUTION INTRAVENOUS at 23:34

## 2022-09-14 RX ADMIN — Medication 50 MG: at 10:53

## 2022-09-14 RX ADMIN — SODIUM CHLORIDE 7.5 G: 900 INJECTION, SOLUTION INTRAVENOUS at 08:00

## 2022-09-14 RX ADMIN — SUGAMMADEX 200 MG: 100 INJECTION, SOLUTION INTRAVENOUS at 15:22

## 2022-09-14 RX ADMIN — ACETAMINOPHEN 975 MG: 325 TABLET, FILM COATED ORAL at 19:45

## 2022-09-14 RX ADMIN — METHOCARBAMOL 500 MG: 500 TABLET, FILM COATED ORAL at 18:37

## 2022-09-14 RX ADMIN — CHLORHEXIDINE GLUCONATE 10 ML: 1.2 SOLUTION ORAL at 06:40

## 2022-09-14 RX ADMIN — VASOPRESSIN 1 UNITS/HR: 20 INJECTION INTRAVENOUS at 11:58

## 2022-09-14 RX ADMIN — Medication 2 G: at 08:15

## 2022-09-14 RX ADMIN — OXYCODONE HYDROCHLORIDE 5 MG: 5 TABLET ORAL at 19:45

## 2022-09-14 RX ADMIN — HYDROMORPHONE HYDROCHLORIDE 0.4 MG: 0.2 INJECTION, SOLUTION INTRAMUSCULAR; INTRAVENOUS; SUBCUTANEOUS at 18:13

## 2022-09-14 RX ADMIN — HYDROMORPHONE HYDROCHLORIDE 0.4 MG: 0.2 INJECTION, SOLUTION INTRAMUSCULAR; INTRAVENOUS; SUBCUTANEOUS at 20:07

## 2022-09-14 RX ADMIN — LIDOCAINE HYDROCHLORIDE 100 MG: 20 INJECTION, SOLUTION INFILTRATION; PERINEURAL at 07:41

## 2022-09-14 RX ADMIN — NOREPINEPHRINE BITARTRATE 6.4 MCG: 1 INJECTION, SOLUTION, CONCENTRATE INTRAVENOUS at 09:21

## 2022-09-14 RX ADMIN — MIDAZOLAM HYDROCHLORIDE 1 MG: 1 INJECTION, SOLUTION INTRAMUSCULAR; INTRAVENOUS at 07:10

## 2022-09-14 RX ADMIN — Medication 0.01 MCG/KG/MIN: at 16:02

## 2022-09-14 RX ADMIN — Medication 50 MG: at 12:53

## 2022-09-14 RX ADMIN — ACETAMINOPHEN 975 MG: 325 TABLET, FILM COATED ORAL at 06:35

## 2022-09-14 RX ADMIN — FENTANYL CITRATE 250 MCG: 50 INJECTION, SOLUTION INTRAMUSCULAR; INTRAVENOUS at 07:41

## 2022-09-14 RX ADMIN — SODIUM CHLORIDE, SODIUM GLUCONATE, SODIUM ACETATE, POTASSIUM CHLORIDE AND MAGNESIUM CHLORIDE: 526; 502; 368; 37; 30 INJECTION, SOLUTION INTRAVENOUS at 14:44

## 2022-09-14 RX ADMIN — KETOROLAC TROMETHAMINE 15 MG: 15 INJECTION, SOLUTION INTRAMUSCULAR; INTRAVENOUS at 22:05

## 2022-09-14 RX ADMIN — Medication 0.03 MCG/KG/MIN: at 10:13

## 2022-09-14 RX ADMIN — SODIUM CHLORIDE 1.25 G/HR: 900 INJECTION, SOLUTION INTRAVENOUS at 09:04

## 2022-09-14 RX ADMIN — Medication 50 MG: at 08:55

## 2022-09-14 RX ADMIN — Medication 2 G: at 11:44

## 2022-09-14 RX ADMIN — FENTANYL CITRATE 200 MCG: 50 INJECTION, SOLUTION INTRAMUSCULAR; INTRAVENOUS at 08:46

## 2022-09-14 RX ADMIN — FENTANYL CITRATE 100 MCG: 50 INJECTION, SOLUTION INTRAMUSCULAR; INTRAVENOUS at 09:55

## 2022-09-14 RX ADMIN — CYCLOBENZAPRINE 10 MG: 5 TABLET, FILM COATED ORAL at 21:08

## 2022-09-14 RX ADMIN — PROPOFOL 5 MCG/KG/MIN: 10 INJECTION, EMULSION INTRAVENOUS at 15:59

## 2022-09-14 RX ADMIN — EPINEPHRINE 0.03 MCG/KG/MIN: 1 INJECTION INTRAMUSCULAR; INTRAVENOUS; SUBCUTANEOUS at 13:31

## 2022-09-14 RX ADMIN — SODIUM CHLORIDE, SODIUM GLUCONATE, SODIUM ACETATE, POTASSIUM CHLORIDE AND MAGNESIUM CHLORIDE: 526; 502; 368; 37; 30 INJECTION, SOLUTION INTRAVENOUS at 08:00

## 2022-09-14 RX ADMIN — FENTANYL CITRATE 50 MCG: 50 INJECTION, SOLUTION INTRAMUSCULAR; INTRAVENOUS at 07:08

## 2022-09-14 RX ADMIN — Medication 100 MG: at 07:42

## 2022-09-14 RX ADMIN — SODIUM CHLORIDE, SODIUM GLUCONATE, SODIUM ACETATE, POTASSIUM CHLORIDE AND MAGNESIUM CHLORIDE: 526; 502; 368; 37; 30 INJECTION, SOLUTION INTRAVENOUS at 07:30

## 2022-09-14 RX ADMIN — SODIUM CHLORIDE, POTASSIUM CHLORIDE, SODIUM LACTATE AND CALCIUM CHLORIDE 1000 ML: 600; 310; 30; 20 INJECTION, SOLUTION INTRAVENOUS at 17:45

## 2022-09-14 RX ADMIN — ASPIRIN 81 MG CHEWABLE TABLET 162 MG: 81 TABLET CHEWABLE at 06:38

## 2022-09-14 RX ADMIN — SODIUM CHLORIDE, POTASSIUM CHLORIDE, SODIUM LACTATE AND CALCIUM CHLORIDE 500 ML: 600; 310; 30; 20 INJECTION, SOLUTION INTRAVENOUS at 21:02

## 2022-09-14 RX ADMIN — VASOPRESSIN 3 UNITS/HR: 20 INJECTION INTRAVENOUS at 16:00

## 2022-09-14 RX ADMIN — SODIUM CHLORIDE, SODIUM GLUCONATE, SODIUM ACETATE, POTASSIUM CHLORIDE AND MAGNESIUM CHLORIDE: 526; 502; 368; 37; 30 INJECTION, SOLUTION INTRAVENOUS at 11:29

## 2022-09-14 RX ADMIN — GABAPENTIN 100 MG: 100 CAPSULE ORAL at 06:37

## 2022-09-14 RX ADMIN — PROPOFOL 50 MG: 10 INJECTION, EMULSION INTRAVENOUS at 07:41

## 2022-09-14 RX ADMIN — Medication 12.5 MG: at 06:37

## 2022-09-14 RX ADMIN — NOREPINEPHRINE BITARTRATE 6.4 MCG: 1 INJECTION, SOLUTION, CONCENTRATE INTRAVENOUS at 09:37

## 2022-09-14 ASSESSMENT — ACTIVITIES OF DAILY LIVING (ADL)
ADLS_ACUITY_SCORE: 28
ADLS_ACUITY_SCORE: 20
ADLS_ACUITY_SCORE: 28
ADLS_ACUITY_SCORE: 20
ADLS_ACUITY_SCORE: 28
ADLS_ACUITY_SCORE: 28
ADLS_ACUITY_SCORE: 18
ADLS_ACUITY_SCORE: 20

## 2022-09-14 NOTE — PROGRESS NOTES
Pt admitted to 4E ICU s/p CABG X 5. Pt placed on a CMV 15 450 60% +5. Please see flowsheets for further information.

## 2022-09-14 NOTE — H&P
CVICU ADMISSION NOTE  9/14/2022    ASSESSMENT  Luis Alberto Garcia is a 72 year old female with history of HTN, smoking, emphysema, GERD, fatty liver disease, migraines, anxiety, insomnia, aortic stenosis now s/p CABG x5 (LIMA-Diag, rSVG to LAD, PDA, AILIN, OM) with Dr. Pathak on 9/14.     Intraop events  - Intraoperative products: cell saver   - Cross clamp time: 1 hr 55 min  - Total bypass time:  2hr 15 min  - Pre bypass CAROLINA: Normal LV size and function, EF 65%, discrete basal anteroseptal hypertrophy and chordal SAMIR without dynamic LVOT obstruction. RV normal size and function. No RWMA. Tri leaflet aortic valve, calcified leaflets, restriction of RCC. Mild AS, BAYRON=1.96 cm2, mean gradient 11 mm Hg. Moderate AI, VC 0.375 cm, EROA=0.13 cm2, regurgitant volume=33.67 mL, no flow reversal in descending aorta.  Mild MR. Trace TR. RA mildly dilated. Grade 3, moderate atheroma, 3-5mm, non mobile in descending aorta. No PFO by color flow doppler. No clot/thrombus in ABHISHEK. No aortic dissection. No pericardial effusion. No pleural effusion.   - Post Bypass CAROLINA: S/p CABG x5. Normal LV size and function, EF 65%, discrete basal anteroseptal hypertrophy and chordal SAMIR without dynamic LVOT obstruction. RV normal size and function. No RWMA. Moderate AI. Mild MR. Mild TR. RA mildly dilated, prominent eustachian valve. Grade 3, moderate atheroma, 3-5mm, non mobile in descending aorta. No PFO by color flow doppler. No clot/thrombus in ABHISHEK. No aortic dissection. No pericardial effusion. No pleural effusion.    PLAN  Neuro/pain/sedation  #Acute postoperative pain  #Sedation  #Anxiety   #Sleep disturbance  - Propofol gtt for sedation - wean now for SBT   - Scheduled: acetaminophen, fentanyl gtt to start overnight if not able to extubate  - PRN: oxycodone, hydromorphone  - Hold PTA gabapentin 200 at bedtime, ambien   - Regional anesthesia: bilateral erector spinae plane catheters  - Resume PTA paxil 20 QAM once extubated       Pulmonary  care  #Emphysema  #Acute respiratory failure with hypoxia  - Mechanical ventilation:  450/15/5/60%  - Last ABG 7.39/141/40/24, wean FiO2 as able  - Goal SpO2 > 92%  - PTA albuterol inhaler held while intubated       Cardiovascular  #Moderate aortic insufficiency, mild aortic stenosis   #CAD s/p CABG x5 with Dr. Pathak on 9/14  #Cardiogenic shock  - Epinephrine, norepinephrine gtt for inotropic and pressor support, wean as able, goal MAP > 65  - BB: metoprolol 25 XR daily   - Statin: resume POD1, pravastatin 10 QAM (allergy to atorvastatin)   - ASA: aspirin 162mg POD1  - TPW: VVI back paced 50 bpm, intrinsic rhythm 60s sinus bradycardia  - hold PTA amlodipine 5 daily, hydrochlorothiazide 50 every day, lisinopril 50 every day, spironolactone 100 QAM      GI care/Nutrition  #GERD  #Fatty liver disease  - NPO, bedside swallow once extubated   - Bowel regimen: MiraLAX, senna  - PPI (PTA omeprazole daily)      Renal/Fluids/Electrolytes  - ICU electrolyte replacement protocols  - Strict I&Os      Endocrine  #Perioperative hyperglycemia  - Insulin gtt to maintain glucose < 180 for optimal wound healing       ID/Antibiotics  #Postoperative prophylactic antibiotics  #Stress induced leukocytosis  - Perioperative antibiotics x24 hours  - WBC 17.9, trend daily       Heme  #Acute blood loss anemia  #Elevated INR  - Preop hgb 12. Postop 10.1, goal Hgb > 7.0, transfuse prn  - INR 1.48, repeat in AM     MSK  #Osteoarthritis s/p bilateral knee replacements  - Hold PTA celbrex      Prophylaxis  - VTE: SCD's, heparin 5000U subcutaneous to start POD1  - PPI, Bowel regimen: MiraLAX, senna  - HOB > 30       LDAs  - CT x 4 (Mediastinal x2, bilateral pleural)  -  Left radial arterial line  - Gray  - RIJ CVC w/hands free  - ETT    Patient seen, findings and plan discussed with CVICU fellow and ICU staff.    Thao Cuadra MD  Surgery PGY-3    - - - - - - - - - - - - - - - - - - - - - - - - - - - - - - - - - - - - - - - - - - - - - - - - - -  - - - - - - - - - - - - - - - - - - - - - -     HISTORY PRESENTING ILLNESS  Luis Alberto Garcia is a 72 year old female with history of HTN, smoking, emphysema, GERD, fatty liver disease, migraines, anxiety, insomnia, aortic stenosis, who presented with fatigue, chest pressure and neck/jaw pain, normal Lexiscan but CT showing moderate coronary calcifications. Therefore sent for coronary angio, found to have severe multivessel coronary disease, now s/p CABG x5 (LIMA-Diag, rSVG to LAD, PDA, AILIN, OM) with Dr. Pathak on .       REVIEW OF SYSTEMS  Patient is sedated and intubated with residual paralysis, unable to assess at this time.    PAST MEDICAL HISTORY  Past Medical History:   Diagnosis Date     Anxiety      Centrilobular emphysema (H)      Childhood asthma      Coronary artery disease involving native coronary artery of native heart      Esophageal reflux      Essential hypertension      Fatty liver disease      Hair loss      Migraine      Mild aortic stenosis      Mixed hyperlipidemia      MRSA infection     Skin abscesses after COVID vaccinations     Osteoarthritis      Primary insomnia        SURGICAL HISTORY  Past Surgical History:   Procedure Laterality Date     CATARACT IOL, RT/LT       IR LUMBAR EPIDURAL STEROID INJECTION  10/06/2005     IR LUMBAR EPIDURAL STEROID INJECTION  2005     Left fibular fracture ORIF       REPLACEMENT TOTAL KNEE Left      REPLACEMENT TOTAL KNEE Right 2021     ROTATOR CUFF REPAIR RT/LT Right        SOCIAL HISTORY  Social History     Socioeconomic History     Marital status: Single     Spouse name: None     Number of children: None     Years of education: None     Highest education level: None   Tobacco Use     Smoking status: Former Smoker     Packs/day: 1.00     Years: 25.00     Pack years: 25.00     Types: Cigarettes     Quit date: 1992     Years since quittin.6     Smokeless tobacco: Never Used   Substance and Sexual Activity     Alcohol use: Yes      Comment: socially        FAMILY HISTORY  Family History   Problem Relation Age of Onset     Lung Cancer Mother      LUNG DISEASE Father      Hypothyroidism Sister      Hypothyroidism Sister      Osteosarcoma Sister      Anesthesia Reaction No family hx of        ALLERGIES     Allergies   Allergen Reactions     Atorvastatin      Kiwi      Latex      Other Drug Allergy (See Comments)      X ray dye and shingrex vaccine        MEDICATIONS  No current facility-administered medications on file prior to encounter.  acyclovir (ZOVIRAX) 400 MG tablet, Take 400 mg by mouth daily as needed  amLODIPine (NORVASC) 5 MG tablet, Take 5 mg by mouth every morning  aspirin (ASA) 81 MG chewable tablet, Take 81 mg by mouth every morning  celecoxib (CELEBREX) 200 MG capsule, Take 200 mg by mouth every morning  Ergocalciferol 50 MCG (2000 UT) TABS, 50 mcg every morning  fish oil-omega-3 fatty acids 1000 MG capsule, Take 2 g by mouth every morning  gabapentin (NEURONTIN) 100 MG capsule, 200 mg At Bedtime  hydrochlorothiazide (HYDRODIURIL) 50 MG tablet, Take 50 mg by mouth every morning  lisinopril (ZESTRIL) 40 MG tablet, Take 40 mg by mouth every morning  magnesium 100 MG CAPS, 200 mg At Bedtime  metoprolol succinate ER (TOPROL XL) 25 MG 24 hr tablet, Take 25 mg by mouth every morning  mupirocin (BACTROBAN) 2 % external ointment, as needed  nitroGLYcerin (NITROSTAT) 0.4 MG sublingual tablet, 0.4 mg every 5 minutes as needed  omeprazole (PRILOSEC) 20 MG DR capsule, 20 mg At Bedtime  PARoxetine (PAXIL) 20 MG tablet, Take 20 mg by mouth every morning  spironolactone (ALDACTONE) 50 MG tablet, 100 mg every morning  zolpidem (AMBIEN) 10 MG tablet, 10 mg nightly as needed  nystatin (MYCOSTATIN) 901152 UNIT/ML suspension, SWISH AND SWALLOW 5 ML BY MOUTH 4 TIMES A DAY. (Patient not taking: Reported on 9/8/2022)  pravastatin (PRAVACHOL) 10 MG tablet, Take 10 mg by mouth every morning  sodium fluoride dental gel (PREVIDENT) 1.1 % GEL topical gel,  Brush 2x/day.  Do not eat or drink for 30 minutes after.        PHYSICAL EXAMINATION  General: Sedated. Appears comfortable   CV: Intrinsic rhythm sinus rate 60s, temp V wires backup 60 bpm    Resp: Mechanically ventilated. Bilateral chest tubes with serosanguinous drainage, dressing dry and intact   Abd: Soft, nondistended, nontender  : montiel with clear yellow urine  Ext: bilateral lower extremities wrapped in Ace  Skin: warm, well perfused    LABS: Reviewed.   Arterial Blood Gases   Recent Labs   Lab 09/14/22  1540 09/14/22  1515 09/14/22  1506 09/14/22  1439   PH 7.38 7.39 7.38 7.33*   PCO2 42 40 41 47*   PO2 101 141* 82 296*   HCO3 25 24 24 24     Complete Blood Count   Recent Labs   Lab 09/14/22  1540 09/14/22  1515 09/14/22  1506 09/14/22  1439 09/14/22  1402 09/14/22  1400 09/14/22  0619 09/08/22  1410   WBC 16.2*  --   --   --   --  17.9*  --  8.3   HGB 10.3* 10.1* 9.8* 8.1*   < > 9.0*   < > 13.1     --   --   --   --  199  --  302    < > = values in this interval not displayed.     Basic Metabolic Panel  Recent Labs   Lab 09/14/22  1515 09/14/22  1506 09/14/22  1439 09/14/22  1402 09/14/22  0637 09/14/22  0619 09/08/22  1410    136 137 137   < > 133* 138   POTASSIUM 4.1 4.0 4.4 4.3   < > 4.5 5.4*   CHLORIDE  --   --   --   --   --  99 102   CO2  --   --   --   --   --  23 25   BUN  --   --   --   --   --  21.6 25.9*   CR  --   --   --   --   --  0.66 0.88   * 131* 147* 174*   < > 109* 108*    < > = values in this interval not displayed.     Liver Function Tests  Recent Labs   Lab 09/14/22  1540 09/14/22  1400 09/08/22  1410   AST  --   --  23   ALT  --   --  21   ALKPHOS  --   --  81   BILITOTAL  --   --  0.8   ALBUMIN  --   --  4.3   INR 1.41* 1.48* 1.08     Pancreatic Enzymes  No lab results found in last 7 days.  Coagulation Profile  Recent Labs   Lab 09/14/22  1540 09/14/22  1400 09/08/22  1410   INR 1.41* 1.48* 1.08   PTT 39* 32 29     Lactate  Invalid input(s):  LACTATE    IMAGING:  Results for orders placed or performed during the hospital encounter of 22   Echocardiogram Complete     Value    LVEF  60-65%    [facility]    [id number]  [id number]  [id number]  [id number]     [facility]  Echocardiography Laboratory  [address] [place] [id number]     Name: SOCORRO POMPA  MRN: [id number]  : 1950  Study Date: 2022 01:42 PM  Age: 72 yrs  Gender: Female  Patient Location: [facility]  Reason For Study: Coronary artery disease involving native coronary artery of  nat  Ordering Physician: SANGITA CEDILLO  Referring Physician: SANGITA CEDILLO  Performed By: Yudith Thompson     BSA: 1.9 m2  Height: 66 in  Weight: 180 lb  ______________________________________________________________________________  Procedure  Echocardiogram with two-dimensional, color and spectral Doppler performed.  ______________________________________________________________________________  Interpretation Summary  Global and regional left ventricular function is normal with an EF of 60-65%.  Right ventricular function, chamber size, wall motion, and thickness are  normal.  Mild to moderate aortic insufficiency is present.  Pulmonary artery systolic pressure is normal.  The inferior vena cava is normal.  No pericardial effusion is present.  There is no prior study for direct comparison.  ______________________________________________________________________________  Left Ventricle  Global and regional left ventricular function is normal with an EF of 60-65%.  Left ventricular wall thickness is normal. Left ventricular size is normal.  Left ventricular diastolic function is indeterminate. Diastolic Doppler  findings (E/E' ratio and/or other parameters) suggest left ventricular filling  pressures are normal. No regional wall motion abnormalities are seen.     Right Ventricle  Right ventricular function, chamber size, wall motion, and thickness are  normal.      Atria  Both atria appear normal. The atrial septum is intact as assessed by color  Doppler .     Mitral Valve  The mitral valve is normal. Mild mitral insufficiency is present.     Aortic Valve  Mild aortic valve calcification is present. Mild to moderate aortic  insufficiency is present. The mean AoV pressure gradient is 21.0 mmHg. The  calculated aortic valve are is 2.0 cm^2. The peak aortic velocity is 3.1  m/sec.     Tricuspid Valve  The tricuspid valve is normal. Trace tricuspid insufficiency is present. The  right ventricular systolic pressure is approximated at 31.6 mmHg plus the  right atrial pressure. Pulmonary artery systolic pressure is normal.     Pulmonic Valve  The pulmonic valve is normal.     Vessels  The thoracic aorta is normal. The pulmonary artery and bifurcation cannot be  assessed. The inferior vena cava is normal.     Pericardium  No pericardial effusion is present.     Compared to Previous Study  There is no prior study for direct comparison.  ______________________________________________________________________________  MMode/2D Measurements & Calculations  IVSd: 0.96 cm  LVIDd: 4.5 cm  LVIDs: 2.7 cm  LVPWd: 0.89 cm  FS: 40.0 %  LV mass(C)d: 137.8 grams  LV mass(C)dI: 72.1 grams/m2  Ao root diam: 3.3 cm  asc Aorta Diam: 3.1 cm  LVOT diam: 2.0 cm  LVOT area: 3.1 cm2  LA Volume (BP): 54.7 ml  LA Volume Index (BP): 28.6 ml/m2     RWT: 0.40     Doppler Measurements & Calculations  MV E max luisito: 93.0 cm/sec  MV A max luisito: 99.2 cm/sec  MV E/A: 0.94  MV dec time: 0.11 sec  Ao V2 max: 313.0 cm/sec  Ao max P.0 mmHg  Ao V2 mean: 212.0 cm/sec  Ao mean P.0 mmHg  Ao V2 VTI: 70.9 cm  BAYRON(I,D): 2.0 cm2  BAYRON(V,D): 1.9 cm2  AI P1/2t: 403.3 msec  LV V1 max P.4 mmHg  LV V1 max: 189.5 cm/sec  LV V1 VTI: 45.0 cm  SV(LVOT): 141.2 ml  SI(LVOT): 73.8 ml/m2  TR max luisito: 281.0 cm/sec  TR max P.6 mmHg  AV Luisito Ratio (DI): 0.61  BAYRON Index (cm2/m2): 1.0  E/E' av.1  Lateral E/e': 10.6  Medial  E/e': 13.6     ______________________________________________________________________________  Report approved by: Guillermo Washington 09/12/2022 02:41 PM

## 2022-09-14 NOTE — ANESTHESIA CARE TRANSFER NOTE
Patient: Luis Alberto Garcia    Procedure: Procedure(s):  MEDIAN STERNOTOMY, CORONARY ARTERY BYPASS GRAFT (CABG) X  5 USING THE LEFT INTERNAL MAMMARY AND ENDOSCOPICALLY HARVESTED RIGHT AND LEFT SAPHENOUS VEIN, ON CARDIOPULMONARY BYPASS, INTRAOPERATIVE TRANSESOPHAGEAL ECHOCARDIOGRAM BY ANESTHESIA.       Diagnosis: CAD (coronary artery disease) [I25.10]  Diagnosis Additional Information: No value filed.    Anesthesia Type:   General     Note:    Oropharynx: endotracheal tube in place  Level of Consciousness: iatrogenic sedation      Independent Airway: airway patency not satisfactory and stable  Dentition: dentition unchanged  Vital Signs Stable: post-procedure vital signs reviewed and stable  Report to RN Given: handoff report given  Patient transferred to: ICU  Comments: VSS on Vasopressin 3U/hr and sedated on propofol. Mechanically ventilated at adequate tidal volumes and maintaining O2 sats. Full sign-out given to ICU team.     Leland Souza MD   Anesthesia CA-2  ICU Handoff: Call for PAUSE to initiate/utilize ICU HANDOFF, Identified Patient, Identified Responsible Provider, Reviewed the Pertinent Medical History, Discussed Surgical Course, Reviewed Intra-OP Anesthesia Management and Issues during Anesthesia, Set Expectations for Post Procedure Period and Allowed Opportunity for Questions and Acknowledgement of Understanding      Vitals:  Vitals Value Taken Time   BP     Temp 36.5  C (97.7  F) 09/14/22 1600   Pulse 57 09/14/22 1600   Resp 15 09/14/22 1600   SpO2 100 % 09/14/22 1600   Vitals shown include unvalidated device data.    Electronically Signed By: Leland Souza MD  September 14, 2022  4:01 PM

## 2022-09-14 NOTE — BRIEF OP NOTE
[facility]    Brief Operative Note    Pre-operative diagnosis: CAD (coronary artery disease) [I25.10]  Post-operative diagnosis Same as pre-operative diagnosis    Procedure: Procedure(s):  MEDIAN STERNOTOMY, CORONARY ARTERY BYPASS GRAFT (CABG) X  5 USING THE LEFT INTERNAL MAMMARY AND ENDOSCOPICALLY HARVESTED RIGHT AND LEFT SAPHENOUS VEIN, ON CARDIOPULMONARY BYPASS, INTRAOPERATIVE TRANSESOPHAGEAL ECHOCARDIOGRAM BY ANESTHESIA.  Surgeon: Surgeon(s) and Role:     * Galo Pathak MD - Primary     * Kaylan King NP - Assisting     * Maru Sanchez MD - Resident - Assisting     * Harrison Sarah MD - Fellow - Assisting  Anesthesia: General with Block   Estimated Blood Loss: 1L    Drains:  Chest tubes x4 (mediastinal x2, bilateral pleural)  Specimens: * No specimens in log *  Findings:   Small caliber LAD so rSVG-LAD, LIMA-diag.  Complications: None.  Implants: * No implants in log *    Kaylan King, CNP, [facility]-  Cardiothoracic Surgery  Pager [phone]

## 2022-09-14 NOTE — ANESTHESIA PROCEDURE NOTES
Central Line/PA Catheter Placement    Pre-Procedure   Staff -        Anesthesiologist:  Papo Lerma MD       Resident/Fellow: Leland Souza MD       Performed By: resident       Location: OR       Pre-Anesthestic Checklist: patient identified, IV checked, site marked, risks and benefits discussed, informed consent, monitors and equipment checked, pre-op evaluation and at physician/surgeon's request  Timeout:       Correct Patient: Yes        Correct Procedure: Yes        Correct Site: Yes        Correct Position: Yes        Correct Laterality: Yes   Line Placement:   This line was placed Post Induction    Procedure   Procedure: central line       Laterality: right       Insertion Site: internal jugular.       Patient Position: Trendelenburg  Sterile Prep        All elements of maximal sterile barrier technique followed       Patient Prep/Sterile Barriers: draped, hand hygiene, gloves , hat , mask , draped, gown, sterile gel and probe cover       Skin prep: Chloraprep  Insertion/Injection        Technique: ultrasound guided and Seldinger Technique        1. Ultrasound was used to evaluate the access site.       2. Vein evaluated via ultrasound for patency/adequacy.       3. Using real-time ultrasound the needle/catheter was observed entering the artery/vein.       5. The visualized structures were anatomically normal.       6. There were no apparent abnormal pathologic findings.       Introducer Type: 9 Fr, 2-lumen MAC        Type: Introducer  Narrative         Secured by: suture       Tegaderm and Biopatch dressing used.       Complications: None apparent,        blood aspirated from all lumens,        All lumens flushed: Yes       Verification method: CAROLINA, Ultrasound and Placement to be verified post-op

## 2022-09-14 NOTE — PROGRESS NOTES
Regional Anesthesia and Pain Service    Luis Alberto Garcia was evaluated this evening, moderate to severe pain. A 20 ml Bolus of Bupivicaine 0.25%, (10 mL via right and left catheters) was administered without complication at 1815. Catheters with negative aspirate before and during administration.  There were no immediate complications.  BP, P, and MAP stable. The patient and their nurse were informed of necessary vigilance over vital signs for 30 minutes following the bolus. Please reach out to the [facility] team for any questions or concerns.        Kerrie Bennett MD  Anesthesiology, CA-2

## 2022-09-14 NOTE — ANESTHESIA PROCEDURE NOTES
Airway         Procedure Start/Stop Times: 9/14/2022 7:45 AM  Staff -        Anesthesiologist:  Papo Lerma MD       Resident/Fellow: Leland Souza MD       Performed By: residentIndications and Patient Condition       Indications for airway management: joel-procedural       Induction type:intravenous       Mask difficulty assessment: 2 - vent by mask + OA or adjuvant +/- NMBA    Final Airway Details       Final airway type: endotracheal airway       Successful airway: ETT - single  Endotracheal Airway Details        ETT size (mm): 7.5       Cuffed: yes       Successful intubation technique: direct laryngoscopy       DL Blade Type: Randall 2       Grade View of Cords: 2       Adjucts: bougie and stylet       Position: Right       Measured from: gums/teeth       Secured at (cm): 23       Bite block used: None    Post intubation assessment        Placement verified by: capnometry, equal breath sounds and chest rise        Number of attempts at approach: 2       Number of other approaches attempted: 0       Secured with: silk tape       Ease of procedure: easy       Dentition: Intact and Unchanged    Medication(s) Administered   Medication Administration Time: 9/14/2022 7:45 AM    Additional Comments       Anterior airway, used bougie to direct ETT. Grade 2B view with Randall 2. Unsuccessful with MAC3.

## 2022-09-14 NOTE — ANESTHESIA PROCEDURE NOTES
Perioperative CAROLINA Procedure Note    Staff -        Anesthesiologist:  Papo Lerma MD       Resident/Fellow: Teofilo Portillo MD       Performed By: anesthesiologist and with fellow       Procedure performed by resident/fellow/CRNA in presence of a teaching physician.    Preanesthesia Checklist:  Patient identified, IV assessed, risks and benefits discussed, monitors and equipment assessed, procedure being performed at surgeon's request and anesthesia consent obtained.    CAROLINA Probe Insertion  Probe Number: x8  Probe Status PRE Insertion: NO obvious damage  Probe type:  Adult 3D  Bite block used:   Soft  Insertion Technique: Jaw Lift  Insertion complications: None obvious  Billing Report:CAROLINA report by Anesthesiologist (See Separate Report note)  Probe Status POST Removal: NO obvious damage    CAROLINA Report  General Procedure Information  Fellow/Resident Interpretation: I personally reviewed the images and the fellow/resident interpretation.  I agree with the fellow/resident interpretation as amended by myself.   Images for this study have been archived.  Modalities: 2D, 3D, CW Doppler, PW Doppler and Color flow mapping  Diagnostic Indications comments: Hemodynamic monitoring.  Echocardiographic and Doppler Measurements  Right Ventricle:  Cavity size normal.    Hypertrophy not present.   Thrombus not present.    Global function normal.     Left Ventricle:  Cavity size normal.    Hypertrophy not present.   Thrombus not present.   Global Function normal.   Ejection Fraction 65%.      Ventricular Regional Function:  1- Basal Anteroseptal:  normal  2- Basal Anterior:  normal  3- Basal Anterolateral:  normal  4- Basal Inferolateral:  normal  5- Basal Inferior:  normal  6- Basal Inferoseptal:  normal  7- Mid Anteroseptal:  normal  8- Mid Anterior:  normal  9- Mid Anterolateral:  normal  10- Mid Inferolateral:  normal  11- Mid Inferior:  normal  12- Mid Inferoseptal:  normal  13- Apical Anterior:  normal  14- Apical  Lateral:  normal  15- Apical Inferior:  normal  16- Apical Septal:  normal  17- [place]:  normal    Valves  Aortic Valve: Annulus calcified.  Stenosis mild.  Area: 1.96 cm .  Mean Gradient: 11 mmHg.  Pressure Half-time: 640 ms.  Regurgitation +2.  Leaflets calcified.  Leaflet motions restricted.  Specific leaflet segments with abnormal motions:  RCC  Mitral Valve: Annulus normal.  Stenosis not present.  Regurgitation +1.  Leaflets normal.  Leaflet motions normal.    Tricuspid Valve: Annulus normal.  Stenosis not present.  Regurgitation +1.  Leaflets normal.  Leaflet motions normal.    Pulmonic Valve: Annulus normal.  Stenosis not present.  Regurgitation +1.      Aorta: Ascending Aorta: Size normal.  Dissection not present.  Plaque thickness less than 3 mm.  Mobile plaque not present.    Aortic Arch: Size normal.    Dissection not present.   Plaque thickness less than 3 mm.   Mobile plaque not present.    Descending Aorta: Size normal.    Dissection not present.   Plaque thickness greater than 3 mm.   Mobile plaque not present.      Right Atrium:  Size dilated.   Spontaneous echo contrast not present.   Thrombus not present.   Tumor not present.   Device not present.     Left Atrium: Size normal.  Spontaneous echo contrast not present.  Thrombus not present.  Tumor not present.  Device not present.    Left atrial appendage normal.     Atrial Septum: Intra-atrial septal morphology normal.       Ventricular Septum: Intra-ventricular septum morphology hypertrophy.    Other ventricular septal defect findings:  Basal anteroseptal hypertrophy    Other Findings:   Pericardium:  normal. Pleural Effusion:  none.   Cornoary sinus catheter present.    Post Intervention Findings  Procedure(s) performed:  CABG. Global function:  Unchanged. Regional wall motion: Unchanged. Surgeon(s) notified of all postintervention findings: Yes.                 Echocardiogram Comments  Echocardiogram comments: Pre CPB:  Normal LV size and function,  EF 65%, discrete basal anteroseptal hypertrophy and chordal SAMIR without dynamic LVOT obstruction. RV normal size and function. No RWMA. Tri leaflet aortic valve, calcified leaflets, restriction of RCC. Mild AS, BAYRON=1.96 cm2, mean gradient 11 mm Hg. Moderate AI, VC 0.375 cm, EROA=0.13 cm2, regurgitant volume=33.67 mL, no flow reversal in descending aorta.  Mild MR. Trace TR. RA mildly dilated. Grade 3, moderate atheroma, 3-5mm, non mobile in descending aorta. No PFO by color flow doppler. No clot/thrombus in ABHISHEK. No aortic dissection. No pericardial effusion. No pleural effusion. All findings communicated with surgeon.    Post CPB:  S/p CABG x5. Normal LV size and function, EF 65%, discrete basal anteroseptal hypertrophy and chordal SAMIR without dynamic LVOT obstruction. RV normal size and function. No RWMA. Moderate AI. Mild MR. Mild TR. RA mildly dilated, prominent eustachian valve. Grade 3, moderate atheroma, 3-5mm, non mobile in descending aorta. No PFO by color flow doppler. No clot/thrombus in ABHISHEK. No aortic dissection. No pericardial effusion. No pleural effusion. All findings communicated with surgeon..

## 2022-09-14 NOTE — ANESTHESIA PROCEDURE NOTES
Arterial Line Procedure Note    Pre-Procedure   Staff -        Anesthesiologist:  Papo Lerma MD       Resident/Fellow: Leland Souza MD       Performed By: resident       Location: OR       Pre-Anesthestic Checklist: patient identified, IV checked, risks and benefits discussed, informed consent, monitors and equipment checked, pre-op evaluation and at physician/surgeon's request  Timeout:       Correct Patient: Yes        Correct Procedure: Yes        Correct Site: Yes        Correct Position: Yes   Line Placement:   This line was placed Pre Induction starting at 9/14/2022 7:40 AM and ending at 9/14/2022 7:47 AM  Procedure   Procedure: arterial line       Diagnosis: HD monitoring       Laterality: left       Insertion Site: radial.  Sterile Prep        Standard elements of sterile barrier followed       Skin prep: Chloraprep  Insertion/Injection        Technique: ultrasound guided and Seldinger Technique        1. Ultrasound was used to evaluate the access site.       2. Artery evaluated via ultrasound for patency/adequacy.       3. Using real-time ultrasound the needle/catheter was observed entering the artery/vein.       5. The visualized structures were anatomically normal.       6. There were no apparent abnormal pathologic findings.       Catheter Type/Size: 20 G, 12 cm  Narrative         Secured by: suture       Tegaderm and Biopatch dressing used.       Complications: None apparent,        Arterial waveform: Yes        IBP within 10% of NIBP: Yes

## 2022-09-14 NOTE — ANESTHESIA PROCEDURE NOTES
Erector spinae Procedure Note    Pre-Procedure   Staff -        Anesthesiologist:  Galileo Blackmon MD       Resident/Fellow: Robert Mckeon MD       Performed By: resident       Procedure Start/Stop Times: 9/14/2022 7:00 AM       Pre-Anesthestic Checklist: patient identified, IV checked, site marked, risks and benefits discussed, informed consent, monitors and equipment checked, pre-op evaluation, at physician/surgeon's request and post-op pain management  Timeout:       Correct Patient: Yes        Correct Procedure: Yes        Correct Site: Yes        Correct Position: Yes        Correct Laterality: Yes        Site Marked: Yes  Procedure Documentation  Procedure: Erector spinae       Laterality: bilateral       Patient Position: prone       Skin prep: Chloraprep       Local skin infiltrated with mL of 1% lidocaine.        Insertion Site: T5-6.       Needle Type: Touhy needle       Needle Gauge: 17.        Needle Length (Inches): 3.13        Catheter: 19 G.                Ultrasound guided       1. Ultrasound was used to identify targeted nerve, plexus, vascular marker, or fascial plane and place a needle adjacent to it in real-time.       2. Ultrasound was used to visualize the spread of anesthetic in close proximity to the above referenced structure.    Assessment/Narrative         The placement was negative for: blood aspirated, painful injection and site bleeding       Paresthesias: No.       Bolus given via catheter. no blood aspirated via catheter.        Secured via Tegaderm.     Medication(s) Administered   Medication Administration Time: 9/14/2022 7:00 AM     Comments:  No bolus given

## 2022-09-14 NOTE — PROGRESS NOTES
[facility], Procedure Note          Extubation:       Luis Alberto Garcia  MRN# [id number]   September 14, 2022, 5:40 PM         Patient extubated at: September 14, 2022, 5:40 PM   Supplemental Oxygen: Via nasal cannula at 4 liters per minute   Cough: The cough is good and productive   Secretion Mode: PRN suction with assistance   Secretion Amount: Moderate amount, moderately thick and white / yellow in color   Respiratory Exam:: Breath sounds: good aeration     Location: bilaterally   Skin Exam:: Patient color: natural   Patient Status: Currently appears comfortable   Arterial Blood Gasses: pH Arterial (no units)   Date Value   09/14/2022 7.32 (L)     pH Arterial POCT (no units)   Date Value   09/14/2022 7.39     pO2 Arterial (mm Hg)   Date Value   09/14/2022 108 (H)     pO2 Arterial POCT (mm Hg)   Date Value   09/14/2022 141 (H)     pCO2 Arterial (mm Hg)   Date Value   09/14/2022 43     pCO2 Arterial POCT (mm Hg)   Date Value   09/14/2022 40     Bicarbonate Arterial (mmol/L)   Date Value   09/14/2022 22     Bicarbonate Arterial POCT (mmol/L)   Date Value   09/14/2022 24            Recorded by Jocelynn Astudillo RT

## 2022-09-15 ENCOUNTER — APPOINTMENT (OUTPATIENT)
Dept: OCCUPATIONAL THERAPY | Facility: CLINIC | Age: 72
DRG: 236 | End: 2022-09-15
Attending: SURGERY
Payer: COMMERCIAL

## 2022-09-15 ENCOUNTER — APPOINTMENT (OUTPATIENT)
Dept: GENERAL RADIOLOGY | Facility: CLINIC | Age: 72
DRG: 236 | End: 2022-09-15
Attending: SURGERY
Payer: COMMERCIAL

## 2022-09-15 DIAGNOSIS — Z95.1 S/P CABG (CORONARY ARTERY BYPASS GRAFT): Primary | ICD-10-CM

## 2022-09-15 LAB
ALBUMIN SERPL BCG-MCNC: 3.1 G/DL (ref 3.5–5.2)
ALP SERPL-CCNC: 51 U/L (ref 35–104)
ALT SERPL W P-5'-P-CCNC: 16 U/L (ref 10–35)
ANION GAP SERPL CALCULATED.3IONS-SCNC: 8 MMOL/L (ref 7–15)
AST SERPL W P-5'-P-CCNC: 48 U/L (ref 10–35)
ATRIAL RATE - MUSE: 61 BPM
ATRIAL RATE - MUSE: 67 BPM
BASE EXCESS BLDV CALC-SCNC: 0.2 MMOL/L (ref -7.7–1.9)
BILIRUB SERPL-MCNC: 0.4 MG/DL
BUN SERPL-MCNC: 21.1 MG/DL (ref 8–23)
CA-I BLD-MCNC: 4.6 MG/DL (ref 4.4–5.2)
CALCIUM SERPL-MCNC: 8.2 MG/DL (ref 8.8–10.2)
CHLORIDE SERPL-SCNC: 105 MMOL/L (ref 98–107)
CREAT SERPL-MCNC: 0.76 MG/DL (ref 0.51–0.95)
DEPRECATED HCO3 PLAS-SCNC: 25 MMOL/L (ref 22–29)
DIASTOLIC BLOOD PRESSURE - MUSE: NORMAL MMHG
DIASTOLIC BLOOD PRESSURE - MUSE: NORMAL MMHG
ERYTHROCYTE [DISTWIDTH] IN BLOOD BY AUTOMATED COUNT: 14.1 % (ref 10–15)
GFR SERPL CREATININE-BSD FRML MDRD: 83 ML/MIN/1.73M2
GLUCOSE BLDC GLUCOMTR-MCNC: 123 MG/DL (ref 70–99)
GLUCOSE BLDC GLUCOMTR-MCNC: 135 MG/DL (ref 70–99)
GLUCOSE BLDC GLUCOMTR-MCNC: 142 MG/DL (ref 70–99)
GLUCOSE BLDC GLUCOMTR-MCNC: 155 MG/DL (ref 70–99)
GLUCOSE SERPL-MCNC: 150 MG/DL (ref 70–99)
HCO3 BLDV-SCNC: 27 MMOL/L (ref 21–28)
HCT VFR BLD AUTO: 27.9 % (ref 35–47)
HCV RNA SERPL NAA+PROBE-ACNC: NOT DETECTED IU/ML
HGB BLD-MCNC: 9.1 G/DL (ref 11.7–15.7)
HIV 1+2 AB+HIV1P24 AG SERPLBLD IA.RAPID: NON REACTIVE
HIV 1+2 AB+HIV1P24 AG SERPLBLD IA.RAPID: NON REACTIVE
HIV INTERPRETATION: NORMAL
INTERPRETATION ECG - MUSE: NORMAL
INTERPRETATION ECG - MUSE: NORMAL
MAGNESIUM SERPL-MCNC: 2.6 MG/DL (ref 1.7–2.3)
MCH RBC QN AUTO: 29.9 PG (ref 26.5–33)
MCHC RBC AUTO-ENTMCNC: 32.6 G/DL (ref 31.5–36.5)
MCV RBC AUTO: 92 FL (ref 78–100)
O2/TOTAL GAS SETTING VFR VENT: 2 %
OXYHGB MFR BLDV: 61 % (ref 70–75)
P AXIS - MUSE: 52 DEGREES
P AXIS - MUSE: 59 DEGREES
PCO2 BLDV: 53 MM HG (ref 40–50)
PH BLDV: 7.31 [PH] (ref 7.32–7.43)
PHOSPHATE SERPL-MCNC: 5.1 MG/DL (ref 2.5–4.5)
PLATELET # BLD AUTO: 165 10E3/UL (ref 150–450)
PO2 BLDV: 36 MM HG (ref 25–47)
POTASSIUM SERPL-SCNC: 5.1 MMOL/L (ref 3.4–5.3)
PR INTERVAL - MUSE: 190 MS
PR INTERVAL - MUSE: 194 MS
PROT SERPL-MCNC: 4.8 G/DL (ref 6.4–8.3)
QRS DURATION - MUSE: 80 MS
QRS DURATION - MUSE: 80 MS
QT - MUSE: 400 MS
QT - MUSE: 446 MS
QTC - MUSE: 422 MS
QTC - MUSE: 448 MS
R AXIS - MUSE: 38 DEGREES
R AXIS - MUSE: 63 DEGREES
RBC # BLD AUTO: 3.04 10E6/UL (ref 3.8–5.2)
SODIUM SERPL-SCNC: 138 MMOL/L (ref 136–145)
SYSTOLIC BLOOD PRESSURE - MUSE: NORMAL MMHG
SYSTOLIC BLOOD PRESSURE - MUSE: NORMAL MMHG
T AXIS - MUSE: 39 DEGREES
T AXIS - MUSE: 43 DEGREES
VENTRICULAR RATE- MUSE: 61 BPM
VENTRICULAR RATE- MUSE: 67 BPM
WBC # BLD AUTO: 13 10E3/UL (ref 4–11)

## 2022-09-15 PROCEDURE — 85027 COMPLETE CBC AUTOMATED: CPT | Performed by: SURGERY

## 2022-09-15 PROCEDURE — 84100 ASSAY OF PHOSPHORUS: CPT | Performed by: SURGERY

## 2022-09-15 PROCEDURE — 71045 X-RAY EXAM CHEST 1 VIEW: CPT | Mod: 26 | Performed by: RADIOLOGY

## 2022-09-15 PROCEDURE — 71045 X-RAY EXAM CHEST 1 VIEW: CPT

## 2022-09-15 PROCEDURE — 258N000003 HC RX IP 258 OP 636: Performed by: SURGERY

## 2022-09-15 PROCEDURE — 97530 THERAPEUTIC ACTIVITIES: CPT | Mod: GO | Performed by: OCCUPATIONAL THERAPIST

## 2022-09-15 PROCEDURE — 97165 OT EVAL LOW COMPLEX 30 MIN: CPT | Mod: GO | Performed by: OCCUPATIONAL THERAPIST

## 2022-09-15 PROCEDURE — 250N000011 HC RX IP 250 OP 636: Performed by: SURGERY

## 2022-09-15 PROCEDURE — 250N000013 HC RX MED GY IP 250 OP 250 PS 637

## 2022-09-15 PROCEDURE — 93010 ELECTROCARDIOGRAM REPORT: CPT | Mod: 59 | Performed by: INTERNAL MEDICINE

## 2022-09-15 PROCEDURE — 99232 SBSQ HOSP IP/OBS MODERATE 35: CPT | Performed by: ANESTHESIOLOGY

## 2022-09-15 PROCEDURE — 82330 ASSAY OF CALCIUM: CPT | Performed by: SURGERY

## 2022-09-15 PROCEDURE — 97535 SELF CARE MNGMENT TRAINING: CPT | Mod: GO | Performed by: OCCUPATIONAL THERAPIST

## 2022-09-15 PROCEDURE — 250N000013 HC RX MED GY IP 250 OP 250 PS 637: Performed by: STUDENT IN AN ORGANIZED HEALTH CARE EDUCATION/TRAINING PROGRAM

## 2022-09-15 PROCEDURE — 82805 BLOOD GASES W/O2 SATURATION: CPT | Performed by: SURGERY

## 2022-09-15 PROCEDURE — 80053 COMPREHEN METABOLIC PANEL: CPT | Performed by: SURGERY

## 2022-09-15 PROCEDURE — 83735 ASSAY OF MAGNESIUM: CPT | Performed by: SURGERY

## 2022-09-15 PROCEDURE — 250N000013 HC RX MED GY IP 250 OP 250 PS 637: Performed by: SURGERY

## 2022-09-15 PROCEDURE — 120N000003 HC R&B IMCU UMMC

## 2022-09-15 PROCEDURE — 93005 ELECTROCARDIOGRAM TRACING: CPT

## 2022-09-15 PROCEDURE — 250N000013 HC RX MED GY IP 250 OP 250 PS 637: Performed by: NURSE PRACTITIONER

## 2022-09-15 RX ORDER — PAROXETINE 20 MG/1
20 TABLET, FILM COATED ORAL EVERY MORNING
Status: DISCONTINUED | OUTPATIENT
Start: 2022-09-15 | End: 2022-09-19 | Stop reason: HOSPADM

## 2022-09-15 RX ORDER — ALBUTEROL SULFATE 90 UG/1
2 AEROSOL, METERED RESPIRATORY (INHALATION) EVERY 6 HOURS
Status: DISCONTINUED | OUTPATIENT
Start: 2022-09-15 | End: 2022-09-19 | Stop reason: HOSPADM

## 2022-09-15 RX ORDER — METOPROLOL SUCCINATE 25 MG/1
25 TABLET, EXTENDED RELEASE ORAL EVERY MORNING
Status: DISCONTINUED | OUTPATIENT
Start: 2022-09-15 | End: 2022-09-15

## 2022-09-15 RX ORDER — METHOCARBAMOL 500 MG/1
500 TABLET, FILM COATED ORAL EVERY 6 HOURS PRN
Status: DISCONTINUED | OUTPATIENT
Start: 2022-09-15 | End: 2022-09-16

## 2022-09-15 RX ORDER — CYCLOBENZAPRINE HCL 10 MG
10 TABLET ORAL 3 TIMES DAILY
Status: DISCONTINUED | OUTPATIENT
Start: 2022-09-15 | End: 2022-09-19 | Stop reason: HOSPADM

## 2022-09-15 RX ADMIN — CEFAZOLIN SODIUM 2 G: 2 INJECTION, SOLUTION INTRAVENOUS at 03:37

## 2022-09-15 RX ADMIN — HYDROMORPHONE HYDROCHLORIDE 0.2 MG: 0.2 INJECTION, SOLUTION INTRAMUSCULAR; INTRAVENOUS; SUBCUTANEOUS at 08:29

## 2022-09-15 RX ADMIN — MUPIROCIN 0.5 G: 20 OINTMENT TOPICAL at 07:45

## 2022-09-15 RX ADMIN — Medication 12.5 MG: at 12:03

## 2022-09-15 RX ADMIN — MAGNESIUM SULFATE IN WATER 2 G: 40 INJECTION, SOLUTION INTRAVENOUS at 00:27

## 2022-09-15 RX ADMIN — SENNOSIDES AND DOCUSATE SODIUM 1 TABLET: 50; 8.6 TABLET ORAL at 20:32

## 2022-09-15 RX ADMIN — OXYCODONE HYDROCHLORIDE 5 MG: 5 TABLET ORAL at 20:32

## 2022-09-15 RX ADMIN — HYDROMORPHONE HYDROCHLORIDE 0.4 MG: 0.2 INJECTION, SOLUTION INTRAMUSCULAR; INTRAVENOUS; SUBCUTANEOUS at 17:11

## 2022-09-15 RX ADMIN — ACETAMINOPHEN 975 MG: 325 TABLET, FILM COATED ORAL at 12:03

## 2022-09-15 RX ADMIN — KETOROLAC TROMETHAMINE 15 MG: 15 INJECTION, SOLUTION INTRAMUSCULAR; INTRAVENOUS at 10:03

## 2022-09-15 RX ADMIN — Medication 250 MG: at 01:18

## 2022-09-15 RX ADMIN — HEPARIN SODIUM 5000 UNITS: 5000 INJECTION, SOLUTION INTRAVENOUS; SUBCUTANEOUS at 20:33

## 2022-09-15 RX ADMIN — PAROXETINE 20 MG: 20 TABLET, FILM COATED ORAL at 12:13

## 2022-09-15 RX ADMIN — OXYCODONE HYDROCHLORIDE 5 MG: 5 TABLET ORAL at 01:13

## 2022-09-15 RX ADMIN — SODIUM CHLORIDE, POTASSIUM CHLORIDE, SODIUM LACTATE AND CALCIUM CHLORIDE 500 ML: 600; 310; 30; 20 INJECTION, SOLUTION INTRAVENOUS at 01:54

## 2022-09-15 RX ADMIN — POLYETHYLENE GLYCOL 3350 17 G: 17 POWDER, FOR SOLUTION ORAL at 07:42

## 2022-09-15 RX ADMIN — HYDROMORPHONE HYDROCHLORIDE 0.4 MG: 0.2 INJECTION, SOLUTION INTRAMUSCULAR; INTRAVENOUS; SUBCUTANEOUS at 14:59

## 2022-09-15 RX ADMIN — ACETAMINOPHEN 975 MG: 325 TABLET, FILM COATED ORAL at 20:31

## 2022-09-15 RX ADMIN — ACETAMINOPHEN 975 MG: 325 TABLET, FILM COATED ORAL at 03:54

## 2022-09-15 RX ADMIN — OXYCODONE HYDROCHLORIDE 7.5 MG: 5 TABLET ORAL at 07:42

## 2022-09-15 RX ADMIN — CYCLOBENZAPRINE 10 MG: 10 TABLET, FILM COATED ORAL at 14:18

## 2022-09-15 RX ADMIN — Medication 2.5 MG: at 23:15

## 2022-09-15 RX ADMIN — ASPIRIN 81 MG CHEWABLE TABLET 162 MG: 81 TABLET CHEWABLE at 07:42

## 2022-09-15 RX ADMIN — METHOCARBAMOL 500 MG: 500 TABLET, FILM COATED ORAL at 07:42

## 2022-09-15 RX ADMIN — SENNOSIDES AND DOCUSATE SODIUM 1 TABLET: 50; 8.6 TABLET ORAL at 07:42

## 2022-09-15 RX ADMIN — HEPARIN SODIUM 5000 UNITS: 5000 INJECTION, SOLUTION INTRAVENOUS; SUBCUTANEOUS at 12:03

## 2022-09-15 RX ADMIN — PANTOPRAZOLE SODIUM 40 MG: 40 TABLET, DELAYED RELEASE ORAL at 07:42

## 2022-09-15 RX ADMIN — KETOROLAC TROMETHAMINE 15 MG: 15 INJECTION, SOLUTION INTRAMUSCULAR; INTRAVENOUS at 18:06

## 2022-09-15 RX ADMIN — SODIUM CHLORIDE, POTASSIUM CHLORIDE, SODIUM LACTATE AND CALCIUM CHLORIDE 500 ML: 600; 310; 30; 20 INJECTION, SOLUTION INTRAVENOUS at 06:39

## 2022-09-15 RX ADMIN — OXYCODONE HYDROCHLORIDE 7.5 MG: 5 TABLET ORAL at 12:03

## 2022-09-15 RX ADMIN — OXYCODONE HYDROCHLORIDE 7.5 MG: 5 TABLET ORAL at 16:28

## 2022-09-15 RX ADMIN — CEFAZOLIN SODIUM 2 G: 2 INJECTION, SOLUTION INTRAVENOUS at 12:03

## 2022-09-15 RX ADMIN — KETOROLAC TROMETHAMINE 15 MG: 15 INJECTION, SOLUTION INTRAMUSCULAR; INTRAVENOUS at 03:54

## 2022-09-15 RX ADMIN — HYDROMORPHONE HYDROCHLORIDE 0.2 MG: 0.2 INJECTION, SOLUTION INTRAMUSCULAR; INTRAVENOUS; SUBCUTANEOUS at 04:19

## 2022-09-15 ASSESSMENT — ACTIVITIES OF DAILY LIVING (ADL)
ADLS_ACUITY_SCORE: 25
ADLS_ACUITY_SCORE: 28
ADLS_ACUITY_SCORE: 28
ADLS_ACUITY_SCORE: 26
ADLS_ACUITY_SCORE: 28
ADLS_ACUITY_SCORE: 26
ADLS_ACUITY_SCORE: 26
ADLS_ACUITY_SCORE: 28
ADLS_ACUITY_SCORE: 26
ADLS_ACUITY_SCORE: 26

## 2022-09-15 NOTE — PROGRESS NOTES
CLINICAL NUTRITION SERVICES - BRIEF NOTE    Received provider consult for nutrition education with comments post op cardiovascular surgery (automatic consult on post-op order set). S/p CABGx5 on 9/14. Nutrition education to be completed as able/appropriate (as pt s/p CABG and/or initial VAD).    RD will follow per LOS protocol or if re-consulted.     Joyce Oliver MS, RD, LD  4E (CVICU) RD pager: [phone]  Ascom: [phone]  Weekend/Holiday RD pager: [phone]

## 2022-09-15 NOTE — PROGRESS NOTES
09/15/22 1100   Quick Adds   Type of Visit Initial Occupational Therapy Evaluation   Living Environment   People in Home spouse   Current Living Arrangements house   Home Accessibility stairs to enter home;stairs within home   Number of Stairs, Main Entrance other (see comments)   Number of Stairs, Within Home, Primary other (see comments)   Transportation Anticipated car, drives self;family or friend will provide   Living Environment Comments OT: 7 stairs(rails on right side) then 6 stairs (B rails far apart), enter on main level, 2 flights w/ landing to 2nd level (bedroom and bathroom level, can stay on main level), 3rd level is rental, one flight to basement, doesnt need to use.   Self-Care   Usual Activity Tolerance good   Current Activity Tolerance moderate   Equipment Currently Used at Home none   Fall history within last six months no   General Information   Onset of Illness/Injury or Date of Surgery 09/15/22   Referring Physician Kaylan King, NP   Additional Occupational Profile Info/Pertinent History of Current Problem Luis Alberto Garcia is a 72 year old female with history of HTN, smoking, emphysema, GERD, fatty liver disease, migraines, anxiety, insomnia, aortic stenosis now s/p CABG x5 (LIMA-Diag, rSVG to LAD, PDA, AILIN, OM) with Dr. Pathak on 9/14.   Existing Precautions/Restrictions sternal   Left Upper Extremity (Weight-bearing Status)   (10#)   Right Upper Extremity (Weight-bearing Status)   (10#)   Cognitive Status Examination   Cognitive Status Comments no concerns at this time   Visual Perception   Impact of Vision Impairment on Function (Vision) no acute changes noted   Range of Motion Comprehensive   Comment, General Range of Motion WFL w/in precautions   Strength Comprehensive (MMT)   Comment, General Manual Muscle Testing (MMT) Assessment overall deconditioned   Bed Mobility   Comment (Bed Mobility) OT: Educated required for safety to adhere to precuations   Transfers   Transfer Comments OT:  Educated required for safety to adhere to precuations   Bathing Assessment/Intervention   Comment, (Bathing) per clinical judgement anticiapte min A   Upper Body Dressing Assessment/Training   Comment, (Upper Body Dressing) per clinical judgement anticiapte min A   Lower Body Dressing Assessment/Training   Comment, (Lower Body Dressing) total assist donning socks   Toileting   Comment, (Toileting) per clinical judgement anticiapte min A   Clinical Impression   Criteria for Skilled Therapeutic Interventions Met (OT) Yes, treatment indicated   OT Diagnosis decreased ind in I/ADLS   OT Problem List-Impairments impacting ADL problems related to;activity tolerance impaired;balance;mobility;strength;post-surgical precautions   ADL comments/analysis decreased ind in I/ADLS   Assessment of Occupational Performance 1-3 Performance Deficits   Planned Therapy Interventions (OT) ADL retraining;IADL retraining;strengthening;transfer training;home program guidelines;progressive activity/exercise   Clinical Decision Making Complexity (OT) low complexity   Risk & Benefits of therapy have been explained evaluation/treatment results reviewed;care plan/treatment goals reviewed;patient   OT Discharge Planning   OT Discharge Recommendation (DC Rec) home with assist;home with outpatient cardiac rehab   OT Rationale for DC Rec ancitipate that when pt is medically approrpaite for discharge will be able to go home w/ A from spouse for heavy lifting and OP CR to increase functional endurance   Total Evaluation Time (Minutes)   Total Evaluation Time (Minutes) 8   OT Goals   Therapy Frequency (OT) Daily   OT Predicted Duration/Target Date for Goal Attainment 09/25/22   OT Goals Hygiene/Grooming;Upper Body Dressing;Lower Body Dressing;Bed Mobility;Transfers;Toilet Transfer/Toileting;Cardiac Phase 1   OT: Hygiene/Grooming modified independent   OT: Upper Body Dressing Modified independent   OT: Lower Body Dressing Modified independent   OT: Bed  Mobility Modified independent   OT: Transfer Modified independent   OT: Toilet Transfer/Toileting Modified independent   OT: Understanding of cardiac education to maximize quality of life, condition management, and health outcomes Caregiver   OT: Perform aerobic activity with stable cardiovascular response continuous;30 minutes   OT: Functional/aerobic ambulation tolerance with stable cardiovascular response in order to return to home and community environment Modified independent;Greater than 300 feet   OT: Navigation of stairs simulating home set up with stable cardiovascular response in order to return to home and community environment Modified independent;Greater than 10 stairs

## 2022-09-15 NOTE — PLAN OF CARE
Admitted/transferred from: OR  Reason for admission/transfer: Post Op care  2 RN skin assessment: completed by Mendez SALDIVAR RN and Karli SALDIVAR RN  Result of skin assessment and interventions/actions: Mediastinal incision and chest tube sites.   Height, weight, drug calc weight: Done  Patient belongings (see Flowsheet)    Pt arrived on unit at 1530, post op has been unremarkable, bleeding WDL and UOP normal. Pt extubated to NC around 1745, tolerating well on NC. Complaining of 10/10 pain despite all interventions, now able to take oral medications, so will try oral pain medications. Family at bedside and attentive to patient, updated on POC.   ?

## 2022-09-15 NOTE — PROGRESS NOTES
Cardiovascular Surgery Progress Note  09/15/2022         Assessment and Plan:     Luis Alberto Garcia is a 72 year old female with history of HTN, smoking, emphysema, GERD, fatty liver disease, migraines, anxiety, insomnia, aortic stenosis now s/p CABG x5 (LIMA-Diag, rSVG to LAD, PDA, AILIN, OM) with Dr. Pathak on 9/14.     Cardiovascular:   Severe multivessel CAD s/p CABG x5   Moderate aortic insufficiency and mild aortic stenosis  HTN  No arrhythmias overnight, HD stable, in NSR. Vasopressin on and stable  Most recent echo showed LVEF 65%  -  mg daily  - Patient does not want statin  - BB: Metoprolol tartrate 12.5 mg BID with hold parameters  - PTA Toprol, lisinopril, amlodipine, hydrochlorothiazide and spironolactone on hold  - Diuresis as below    Chest tubes: in place to suction, leave in  TPW: CAP    Pulmonary:  Emphysema  Extubated POD 0 to 3 lpm via NC. Now saturating well on room air   - Supplemental O2 PRN to keep sats > 92%. Wean off as tolerated.  - Pulm hygiene, IS, activity and deep breathing  - PTA albuterol inhaler resumed    Neurology / MSK:  Acute post-operative pain   Pain well controlled with current regimen:  - Acetaminophen, PO oxycodone PRN, IV dilaudid PRN, methocarbamol, gabapentin scheduled  - B/L ES catheters in place managed per RAPS  - PTA paxil resumed  - PTA Celebrex on hold     / Renal / Fluid / Electrolytes:  Hypervolemia  BL creat ~ 0.8, most recent creatinine 0.76; adequate UOP.   FLUID STATUS: Pre-op weight 180 lbs, weight today 193 lbs; I/O past 24 hours: +3.3L  - Diuresis: none today  - Replete lytes per protocol  - Strict I/O, daily weights  - Avoid/limit nephrotoxins as able    GI / Nutrition:   GERD  Fatty liver disease  - Tolerating clears, ADAT  - Continue bowel regimen, no BM yet, +flatus    Endocrine:  Stress induced hyperglycemia   - Initially managed on insulin drip postop, transitioned to sliding scale; goal BG <180 for optimal healing    Infectious Disease:  Stress  induced leukocytosis  WBC 13, remains afebrile, no signs or symptoms of infection  - Completed perioperative antibiotics  - Continue to monitor fever curve, CBC    Hematology:   Acute blood loss anemia  Hgb 9.1; Plt 165, no signs or symptoms of active bleeding  - CBC in AM    Anticoagulation:   - ASA only    MSK/Skin:  Sternotomy  Surgical incision  - Sternal precautions  - Incisional cares per protocol    Prophylaxis:   - Stress ulcer prophylaxis: Pantoprazole 40 mg daily  - DVT prophylaxis: Subcutaneous heparin, SCD    Disposition:   - Transfer to  on 9/15    Rounded with Tyler Guy and Priyanka Astudillo, APRN, ACNPC-AG, CCRN  Nurse Practitioner  Cardiothoracic Surgery  Pager: [phone]        Interval History:     No overnight events.  States pain is well managed on current regimen. Slept well overnight.  Tolerating clears, is not yet passing flatus, no BM. No nausea or vomiting.  Breathing well without complaints.   Has not yet been up to chair  Denies chest pain, palpitations, dizziness, syncopal symptoms, fevers, chills, myalgias, or sternal popping/clicking.         Physical Exam:     Gen: A&Ox4, NAD  Neuro: Intact with no focal deficits   CV: RRR, normal S1 S2, no murmurs, rubs or gallops. no JVD  Pulm: CTA, no wheezing or rhonchi, normal breathing on RA  Abd: nondistended, normal BS, soft, nontender  Ext: 2+ peripheral edema, mild pitting  Incision: clean, dry, intact, no erythema, sternum stable  Tubes/drain sites: dressing clean and dry, serosanguinous output, no air leak or crepitus           Data:    Imaging:  reviewed recent imaging, no acute concerns    Recent Results (from the past 24 hour(s))   XR Chest Port 1 View    Narrative    EXAM: XR CHEST PORT 1 VIEW  9/14/2022 7:14 PM     HISTORY:  Post Op CVTS Surgery       COMPARISON:  Chest CT 9/8/2022    FINDINGS: AP radiograph of the chest. Postoperative changes of CABG  with the visualized median sternotomy wires appearing intact.  Right IJ  sheath with tip overlying the mid SVC. Bibasilar chest tubes and  mediastinal drains. No appreciable pneumothorax.    Cardiomediastinal silhouette is within normal limits. Atherosclerotic  calcifications of the aortic arch. Question small left pleural  effusion. No significant right pleural effusion. Pulmonary vasculature  is indistinct with interstitial perihilar opacities.      Impression    IMPRESSION:  1. New postoperative changes of CABG with bibasilar chest tubes and  mediastinal drains. No appreciable thorax.  2. Question small left pleural effusion.  3. Perihilar interstitial opacities likely atelectasis and/or  pulmonary edema.    I have personally reviewed the examination and initial interpretation  and I agree with the findings.    DRU LABOY MD         SYSTEM ID:  [id number]        Labs:  Recent Labs   Lab 09/15/22  0335 09/15/22  0332 09/15/22  0239 09/14/22  2332 09/14/22 2026 09/14/22  1542 09/14/22  1540 09/14/22  1402 09/14/22  1400   WBC  --  13.0*  --   --  15.6*  --  16.2*  --  17.9*   HGB  --  9.1*  --   --  9.6*  --  10.3*   < > 9.0*   MCV  --  92  --   --  91  --  90  --  93   PLT  --  165  --   --  171  --  163  --  199   INR  --   --   --   --  1.30*  --  1.41*  --  1.48*   NA  --  138  --   --  141  --  139   < >  --    POTASSIUM  --  5.1  --   --  3.8  --  4.5   < >  --    CHLORIDE  --  105  --   --  112*  --  107  --   --    CO2  --  25  --   --  20*  --  26  --   --    BUN  --  21.1  --   --  15.4  --  17.6  --   --    CR  --  0.76  --   --  0.55  --  0.68  --   --    ANIONGAP  --  8  --   --  9  --  6*  --   --    ALYCE  --  8.2*  --   --  6.9*  --  9.4  --   --    * 150* 135*   < > 185*   < > 142*   < >  --    ALBUMIN  --  3.1*  --   --  2.5*  --   --   --   --    PROTTOTAL  --  4.8*  --   --  3.9*  --   --   --   --    BILITOTAL  --  0.4  --   --  0.5  --   --   --   --    ALKPHOS  --  51  --   --  45  --   --   --   --    ALT  --  16  --   --  13  --   --   --    --    AST  --  48*  --   --  36*  --   --   --   --     < > = values in this interval not displayed.      GLUCOSE:   Recent Labs   Lab 09/15/22  0335 09/15/22  0332 09/15/22  0239 09/15/22  0034 09/14/22  2332 09/14/22 2026   * 150* 135* 155* 168* 185*

## 2022-09-15 NOTE — PROGRESS NOTES
"Pain Service Progress Note  [facility]  Date: 09/15/2022       Patient Name: Luis Alberto Garcia  MRN: [id number]  Age: 72 year old  Sex: female      Assessment:  71 y/o female s/p CABG    Procedure: CABG    Date of Surgery: 9/14/22    Date of Catheter Placement: 9/14/22    Plan/Recommendations:  1. Regional Anesthesia/Analgesia  -Continuous Catheter Type/Site: Bilateral T4/5 ES catheters  Programmed Intermittent Bolus (PIB) at 7 mL Q60 min via each catheter, total infusion rate of 14 mL/hr    Bolus of 6 mL 0.2% ropivacaine delivered per side. No symptoms of local anesthetic systemic toxicity (LAST) were observed. Remained with and assessed patient for 20 min post-injection (BP, P and MAP stable). Bedside RN aware of need to continue BP, P and MAP monitoring Q 10 min for an additional 30 min. Please contact the Inpatient Pain Service if any of the following: patient experiencing any untoward effects, SBP< 90, P < 50 or > 120, MAP < 60.     2. Anticoagulation  -Please contact Inpatient Pain Service before ordering or making any anticoagulation changes       3. Multimodal Analgesia  - Per primary service    Pain Service will continue to follow.    Discussed with attending anesthesiologist    Please Page the Pain Team Via Amcom: \"PAIN ACUTE INPATIENT PAIN MANAGEMENT ADULT / [facility]\"    Jimenez Vivar MD  09/15/2022     Overnight Events: None    Subjective:  I am sore.   Nausea: Yes  Vomiting: No  Pruritus: No  Symptoms of LAST: No    Pain Location:  Chest    Pain Intensity:    Pain at Rest: 6/10   Pain with Activity: 8/10  Comfort Goal: 4/10   Satisfied with your level of pain control: No    Diet: Advance Diet as Tolerated: Regular Diet Adult; 2 gm NA Diet; Low Saturated Fat Na <2400mg Diet    Relevant Labs:  Recent Labs   Lab Test 09/15/22  0332 09/14/22 2026   INR  --  1.30*    171   PTT  --  32   BUN 21.1 15.4       Physical Exam:  Vitals: BP 98/57   Pulse 68   Temp 99.3  F (37.4  C)   " "Resp 27   Ht 1.676 m (5' 6\")   Wt 87.7 kg (193 lb 5.5 oz)   SpO2 93%   BMI 31.21 kg/m      Physical Exam:   Orientation:  Alert, oriented, and in no acute distress: Yes  Sedation: No    Catheter Site:   Catheter entry site is clean/dry/intact: Yes    Tender: No      Relevant Medications:  Current Pain Medications:  Medications related to Pain Management (From now, onward)    Start     Dose/Rate Route Frequency Ordered Stop    09/17/22 0000  acetaminophen (TYLENOL) tablet 650 mg         650 mg Oral EVERY 4 HOURS PRN 09/14/22 1632      09/15/22 2200  gabapentin (NEURONTIN) capsule 200 mg         200 mg Oral or Feeding Tube AT BEDTIME 09/14/22 1811      09/15/22 0800  aspirin (ASA) chewable tablet 162 mg         162 mg Oral or NG Tube DAILY 09/14/22 1632      09/15/22 0800  polyethylene glycol (MIRALAX) Packet 17 g         17 g Oral DAILY 09/14/22 1632 09/14/22 2100  Lidocaine (LIDOCARE) 4 % Patch 2 patch        \"And\" Linked Group Details    2 patch  over 12 Hours Transdermal EVERY 24 HOURS 2000 09/14/22 2049 09/14/22 2100  lidocaine patch in PLACE        \"And\" Linked Group Details     Transdermal EVERY 8 HOURS SCHEDULED 09/14/22 2049 09/14/22 2047  ketorolac (TORADOL) injection 15 mg         15 mg Intravenous EVERY 6 HOURS PRN 09/14/22 2048 09/16/22 2046 09/14/22 2000  senna-docusate (SENOKOT-S/PERICOLACE) 8.6-50 MG per tablet 1 tablet         1 tablet Oral or Feeding Tube 2 TIMES DAILY 09/14/22 1632 09/14/22 2000  methocarbamol (ROBAXIN) tablet 500 mg         500 mg Oral 4 TIMES DAILY 09/14/22 1811 09/14/22 1700  dexmedetomidine (PRECEDEX) 400 mcg in 0.9% sodium chloride 100 mL         0.2-0.7 mcg/kg/hr × 81.9 kg  4.1-14.3 mL/hr  Intravenous CONTINUOUS 09/14/22 1632 09/14/22 1700  acetaminophen (TYLENOL) tablet 975 mg         975 mg Oral EVERY 8 HOURS 09/14/22 1632 09/17/22 1159    09/14/22 1632  HYDROmorphone (DILAUDID) injection 0.2 mg        \"Or\" Linked Group Details    0.2 mg " "Intravenous EVERY 2 HOURS PRN 09/14/22 1632      09/14/22 1632  HYDROmorphone (DILAUDID) injection 0.4 mg        \"Or\" Linked Group Details    0.4 mg Intravenous EVERY 2 HOURS PRN 09/14/22 1632      09/14/22 1632  oxyCODONE (ROXICODONE) tablet 5 mg        \"Or\" Linked Group Details    5 mg Oral EVERY 4 HOURS PRN 09/14/22 1632      09/14/22 1632  oxyCODONE IR (ROXICODONE) half-tab 7.5 mg        \"Or\" Linked Group Details    7.5 mg Oral EVERY 4 HOURS PRN 09/14/22 1632      09/14/22 1632  magnesium hydroxide (MILK OF MAGNESIA) suspension 30 mL         30 mL Oral DAILY PRN 09/14/22 1632      09/14/22 1632  bisacodyl (DULCOLAX) suppository 10 mg         10 mg Rectal DAILY PRN 09/14/22 1632      09/14/22 1632  methocarbamol (ROBAXIN) half-tab 250 mg         250 mg Oral EVERY 6 HOURS PRN 09/14/22 1632      09/14/22 1632  lidocaine 1 % 0.1-1 mL         0.1-1 mL Other EVERY 1 HOUR PRN 09/14/22 1632      09/14/22 1632  lidocaine (LMX4) cream          Topical EVERY 1 HOUR PRN 09/14/22 1632      09/14/22 1600  propofol (DIPRIVAN) infusion         5-75 mcg/kg/min × 81.9 kg  2.5-36.9 mL/hr  Intravenous CONTINUOUS 09/14/22 1534      09/14/22 1600  fentaNYL (SUBLIMAZE) infusion          mcg/hr  0.5-4 mL/hr  Intravenous CONTINUOUS 09/14/22 1534      09/14/22 0930  ropivacaine 0.2% in NS perineural infusion simple          Perineural Continuous Nerve Block 09/14/22 0912      09/14/22 0930  ropivacaine 0.2% in NS perineural infusion simple          Perineural Continuous Nerve Block 09/14/22 0913            Primary Service Contacted with Recommendations? Yes      Time/Communication:  I personally spent 28 minutes with greater than 50% in consultation, education, counseling and coordination of care.  See note above for details on conversation.          "

## 2022-09-15 NOTE — PLAN OF CARE
Major Shift Events: Reporting moderate to severe pain throughout the day. PRN oxy, dilaudid, and flexeril used with some relief. Up to the chair x1, plan to walk tomorrow. SPB  - stable. SR rates 60-70s.      Plan: Tx to 6B.     For vital signs and complete assessments, please see documentation flowsheets.

## 2022-09-15 NOTE — PLAN OF CARE
Transfer  Transferred from: 4A  Via:bed  Reason for transfer: Pt appropriate for 6B- improved patient condition  Family: Aware of transfer  Belongings: Received with pt  Chart: Received with pt  Medications: Meds received from old unit with pt  Code Status verified on armband: yes - new band printed  2 RN Skin Assessment Completed By: Patient refused at this time, will attempt again when pain under control  Med rec completed: yes  Bed surface reassessed with algorithm and charted: yes/  New bed surface ordered: yes  Suction/Ambu bag/Flowmeter at bedside: yes    Report received from: ICU RN  Pt status: Patient in 9/10 pain upon arrival to 6B. Transferred to bed and was given 0.4 mg dilaudid. Improved pain, back on pain plan. Vitally stable on 1 L NC. Epidurals bolused, tolerated well.

## 2022-09-15 NOTE — ANESTHESIA POSTPROCEDURE EVALUATION
Patient: Luis Alberto Garcia    Procedure: Procedure(s):  MEDIAN STERNOTOMY, CORONARY ARTERY BYPASS GRAFT (CABG) X  5 USING THE LEFT INTERNAL MAMMARY AND ENDOSCOPICALLY HARVESTED RIGHT AND LEFT SAPHENOUS VEIN, ON CARDIOPULMONARY BYPASS, INTRAOPERATIVE TRANSESOPHAGEAL ECHOCARDIOGRAM BY ANESTHESIA.       Anesthesia Type:  General    Note:  Disposition: ICU            ICU Sign Out: Anesthesiologist/ICU physician sign out WAS performed   Postop Pain Control: Uneventful            Sign Out: Well controlled pain   PONV: No   Neuro/Psych: Uneventful            Sign Out: PLANNED postop sedation   Airway/Respiratory: Uneventful            Sign Out: AIRWAY IN SITU/Resp. Support; O2 supplementation               Airway in situ/Resp. Support: ETT   CV/Hemodynamics: Uneventful            Sign Out: Detailed CV status               Blood Pressure: Normal               Rate/Rhythm: Normal HR   Other NRE: NONE   DID A NON-ROUTINE EVENT OCCUR? No           Last vitals:  Vitals:    09/14/22 2000 09/14/22 2015 09/14/22 2030   BP:      Pulse: 72 72 72   Resp: 28     Temp: 37.2  C (99  F)     SpO2: 97% 95% 97%       Electronically Signed By: Mendez Mireles MD  September 14, 2022  9:30 PM

## 2022-09-15 NOTE — PLAN OF CARE
PMH: Luis Alberto is being followed by CVTS who was admitted on 09/14 for scheduled CABGX5 s/p fast track extubation.  Major Shift Events: 1.5 L LR overnight for low UOP (20 mL/Hr X2Hr). Loss of arterial line, order from CVTS to remove A-line.    A&OX4, neuro intact, able to move all extremities. Tmax 99.2. Complains of neck pain 10/10, s/p oxycodone 10 mg (total), dilaudid 0.6 mg (total), robaxin, acetaminophen, cyclobenzaprine, ketorolac X2, lidocaine patches and ES catheters with bolus from anesthesia. Additionally ice packs and frequent positioning. Anxious. 1 L NC, EtCO2: 35 - 45 mmHg. LS diminished. SR on tele, absence of ectopy. MAP stable 70 - 80's, SBP 90 - 110's. Arterial line dampened, absence of blood return. Subsequently FlowTrack lost, CVTS aware. Order from CVTS MD to remove arterial line. Vaso weaned feom 3 to 0.5 Unit/Hr. UOP 30 mL/Hr. 1.5 L LR administered overnight for low UOP. CT OP minimal, 10 - 20 mL/Hr. Electrolytes replaced as needed.  Plan: Pain control, initiate subcutaneous heparin. Continue to monitor.    For complete vital signs, hemodynamics, medications/drips, and complete assessments please see documentation flowsheets.

## 2022-09-15 NOTE — SIGNIFICANT EVENT
SPIRITUAL HEALTH SERVICES Significant Event  [employer] of ANOINTING  [facility] 4a    Pt anointed by her .    Fr. Azalea Solomon   Pager [phone]

## 2022-09-16 ENCOUNTER — APPOINTMENT (OUTPATIENT)
Dept: OCCUPATIONAL THERAPY | Facility: CLINIC | Age: 72
DRG: 236 | End: 2022-09-16
Attending: SURGERY
Payer: COMMERCIAL

## 2022-09-16 LAB
ALBUMIN SERPL BCG-MCNC: 3.2 G/DL (ref 3.5–5.2)
ALP SERPL-CCNC: 51 U/L (ref 35–104)
ALT SERPL W P-5'-P-CCNC: 19 U/L (ref 10–35)
ANION GAP SERPL CALCULATED.3IONS-SCNC: 3 MMOL/L (ref 7–15)
AST SERPL W P-5'-P-CCNC: 72 U/L (ref 10–35)
ATRIAL RATE - MUSE: 63 BPM
BILIRUB SERPL-MCNC: 0.3 MG/DL
BUN SERPL-MCNC: 30.4 MG/DL (ref 8–23)
CALCIUM SERPL-MCNC: 8.2 MG/DL (ref 8.8–10.2)
CHLORIDE SERPL-SCNC: 99 MMOL/L (ref 98–107)
CREAT SERPL-MCNC: 0.9 MG/DL (ref 0.51–0.95)
DEPRECATED HCO3 PLAS-SCNC: 28 MMOL/L (ref 22–29)
DIASTOLIC BLOOD PRESSURE - MUSE: NORMAL MMHG
ERYTHROCYTE [DISTWIDTH] IN BLOOD BY AUTOMATED COUNT: 14.5 % (ref 10–15)
GFR SERPL CREATININE-BSD FRML MDRD: 68 ML/MIN/1.73M2
GLUCOSE BLDC GLUCOMTR-MCNC: 107 MG/DL (ref 70–99)
GLUCOSE BLDC GLUCOMTR-MCNC: 123 MG/DL (ref 70–99)
GLUCOSE BLDC GLUCOMTR-MCNC: 99 MG/DL (ref 70–99)
GLUCOSE SERPL-MCNC: 109 MG/DL (ref 70–99)
HCT VFR BLD AUTO: 25.7 % (ref 35–47)
HGB BLD-MCNC: 8.2 G/DL (ref 11.7–15.7)
INTERPRETATION ECG - MUSE: NORMAL
MAGNESIUM SERPL-MCNC: 2.6 MG/DL (ref 1.7–2.3)
MCH RBC QN AUTO: 29.7 PG (ref 26.5–33)
MCHC RBC AUTO-ENTMCNC: 31.9 G/DL (ref 31.5–36.5)
MCV RBC AUTO: 93 FL (ref 78–100)
P AXIS - MUSE: 77 DEGREES
PHOSPHATE SERPL-MCNC: 2.7 MG/DL (ref 2.5–4.5)
PLATELET # BLD AUTO: 167 10E3/UL (ref 150–450)
POTASSIUM SERPL-SCNC: 4.9 MMOL/L (ref 3.4–5.3)
PR INTERVAL - MUSE: 146 MS
PROT SERPL-MCNC: 5.3 G/DL (ref 6.4–8.3)
QRS DURATION - MUSE: 74 MS
QT - MUSE: 398 MS
QTC - MUSE: 407 MS
R AXIS - MUSE: 61 DEGREES
RBC # BLD AUTO: 2.76 10E6/UL (ref 3.8–5.2)
SODIUM SERPL-SCNC: 130 MMOL/L (ref 136–145)
SYSTOLIC BLOOD PRESSURE - MUSE: NORMAL MMHG
T AXIS - MUSE: 27 DEGREES
VENTRICULAR RATE- MUSE: 63 BPM
WBC # BLD AUTO: 14.8 10E3/UL (ref 4–11)

## 2022-09-16 PROCEDURE — 36415 COLL VENOUS BLD VENIPUNCTURE: CPT | Performed by: NURSE PRACTITIONER

## 2022-09-16 PROCEDURE — 250N000013 HC RX MED GY IP 250 OP 250 PS 637: Performed by: STUDENT IN AN ORGANIZED HEALTH CARE EDUCATION/TRAINING PROGRAM

## 2022-09-16 PROCEDURE — 93005 ELECTROCARDIOGRAM TRACING: CPT

## 2022-09-16 PROCEDURE — 97535 SELF CARE MNGMENT TRAINING: CPT | Mod: GO | Performed by: OCCUPATIONAL THERAPIST

## 2022-09-16 PROCEDURE — 87389 HIV-1 AG W/HIV-1&-2 AB AG IA: CPT | Performed by: SURGERY

## 2022-09-16 PROCEDURE — 87522 HEPATITIS C REVRS TRNSCRPJ: CPT | Performed by: SURGERY

## 2022-09-16 PROCEDURE — 250N000011 HC RX IP 250 OP 636: Performed by: SURGERY

## 2022-09-16 PROCEDURE — 87340 HEPATITIS B SURFACE AG IA: CPT | Performed by: SURGERY

## 2022-09-16 PROCEDURE — 84100 ASSAY OF PHOSPHORUS: CPT | Performed by: NURSE PRACTITIONER

## 2022-09-16 PROCEDURE — 250N000013 HC RX MED GY IP 250 OP 250 PS 637

## 2022-09-16 PROCEDURE — 250N000013 HC RX MED GY IP 250 OP 250 PS 637: Performed by: SURGERY

## 2022-09-16 PROCEDURE — 99231 SBSQ HOSP IP/OBS SF/LOW 25: CPT | Performed by: ANESTHESIOLOGY

## 2022-09-16 PROCEDURE — 83735 ASSAY OF MAGNESIUM: CPT | Performed by: NURSE PRACTITIONER

## 2022-09-16 PROCEDURE — 80053 COMPREHEN METABOLIC PANEL: CPT | Performed by: NURSE PRACTITIONER

## 2022-09-16 PROCEDURE — 120N000003 HC R&B IMCU UMMC

## 2022-09-16 PROCEDURE — 271N000002 HC RX 271: Performed by: SURGERY

## 2022-09-16 PROCEDURE — 250N000011 HC RX IP 250 OP 636: Performed by: PHYSICIAN ASSISTANT

## 2022-09-16 PROCEDURE — 85027 COMPLETE CBC AUTOMATED: CPT | Performed by: NURSE PRACTITIONER

## 2022-09-16 PROCEDURE — 250N000013 HC RX MED GY IP 250 OP 250 PS 637: Performed by: PHYSICIAN ASSISTANT

## 2022-09-16 PROCEDURE — 250N000013 HC RX MED GY IP 250 OP 250 PS 637: Performed by: NURSE PRACTITIONER

## 2022-09-16 PROCEDURE — 999N000248 HC STATISTIC IV INSERT WITH US BY RN

## 2022-09-16 PROCEDURE — 93010 ELECTROCARDIOGRAM REPORT: CPT | Performed by: INTERNAL MEDICINE

## 2022-09-16 RX ORDER — OXYCODONE HYDROCHLORIDE 10 MG/1
10 TABLET ORAL
Status: DISCONTINUED | OUTPATIENT
Start: 2022-09-16 | End: 2022-09-19 | Stop reason: HOSPADM

## 2022-09-16 RX ORDER — LIDOCAINE 4 G/G
1 PATCH TOPICAL
Status: DISCONTINUED | OUTPATIENT
Start: 2022-09-16 | End: 2022-09-19 | Stop reason: HOSPADM

## 2022-09-16 RX ORDER — METHOCARBAMOL 500 MG/1
1000 TABLET, FILM COATED ORAL EVERY 6 HOURS
Status: DISCONTINUED | OUTPATIENT
Start: 2022-09-16 | End: 2022-09-19 | Stop reason: HOSPADM

## 2022-09-16 RX ORDER — FUROSEMIDE 10 MG/ML
20 INJECTION INTRAMUSCULAR; INTRAVENOUS 2 TIMES DAILY
Status: DISCONTINUED | OUTPATIENT
Start: 2022-09-16 | End: 2022-09-18

## 2022-09-16 RX ORDER — OXYCODONE HYDROCHLORIDE 5 MG/1
5 TABLET ORAL
Status: DISCONTINUED | OUTPATIENT
Start: 2022-09-16 | End: 2022-09-19 | Stop reason: HOSPADM

## 2022-09-16 RX ORDER — POLYETHYLENE GLYCOL 3350 17 G/17G
17 POWDER, FOR SOLUTION ORAL 2 TIMES DAILY
Status: DISCONTINUED | OUTPATIENT
Start: 2022-09-16 | End: 2022-09-19 | Stop reason: HOSPADM

## 2022-09-16 RX ORDER — METHOCARBAMOL 500 MG/1
1000 TABLET, FILM COATED ORAL EVERY 6 HOURS PRN
Status: DISCONTINUED | OUTPATIENT
Start: 2022-09-16 | End: 2022-09-16

## 2022-09-16 RX ORDER — KETAMINE HYDROCHLORIDE 10 MG/ML
0.1 INJECTION, SOLUTION INTRAMUSCULAR; INTRAVENOUS
Status: DISCONTINUED | OUTPATIENT
Start: 2022-09-16 | End: 2022-09-19 | Stop reason: HOSPADM

## 2022-09-16 RX ORDER — METHOCARBAMOL 500 MG/1
500 TABLET, FILM COATED ORAL ONCE
Status: DISCONTINUED | OUTPATIENT
Start: 2022-09-16 | End: 2022-09-16 | Stop reason: CLARIF

## 2022-09-16 RX ORDER — OXYCODONE HYDROCHLORIDE 10 MG/1
10 TABLET ORAL EVERY 4 HOURS PRN
Status: DISCONTINUED | OUTPATIENT
Start: 2022-09-16 | End: 2022-09-16

## 2022-09-16 RX ORDER — KETOROLAC TROMETHAMINE 15 MG/ML
15 INJECTION, SOLUTION INTRAMUSCULAR; INTRAVENOUS EVERY 6 HOURS PRN
Status: ACTIVE | OUTPATIENT
Start: 2022-09-16 | End: 2022-09-18

## 2022-09-16 RX ORDER — OXYCODONE HYDROCHLORIDE 5 MG/1
5 TABLET ORAL EVERY 4 HOURS PRN
Status: DISCONTINUED | OUTPATIENT
Start: 2022-09-16 | End: 2022-09-16

## 2022-09-16 RX ADMIN — HEPARIN SODIUM 5000 UNITS: 5000 INJECTION, SOLUTION INTRAVENOUS; SUBCUTANEOUS at 11:30

## 2022-09-16 RX ADMIN — MUPIROCIN 0.5 G: 20 OINTMENT TOPICAL at 08:28

## 2022-09-16 RX ADMIN — OXYCODONE HYDROCHLORIDE 5 MG: 5 TABLET ORAL at 08:43

## 2022-09-16 RX ADMIN — OXYCODONE HYDROCHLORIDE 5 MG: 5 TABLET ORAL at 00:26

## 2022-09-16 RX ADMIN — ASPIRIN 81 MG CHEWABLE TABLET 162 MG: 81 TABLET CHEWABLE at 08:50

## 2022-09-16 RX ADMIN — OXYCODONE HYDROCHLORIDE 5 MG: 5 TABLET ORAL at 22:20

## 2022-09-16 RX ADMIN — Medication 12.5 MG: at 20:01

## 2022-09-16 RX ADMIN — CYCLOBENZAPRINE 10 MG: 10 TABLET, FILM COATED ORAL at 08:26

## 2022-09-16 RX ADMIN — GABAPENTIN 200 MG: 100 CAPSULE ORAL at 22:13

## 2022-09-16 RX ADMIN — OXYCODONE HYDROCHLORIDE 5 MG: 5 TABLET ORAL at 04:30

## 2022-09-16 RX ADMIN — HYDROMORPHONE HYDROCHLORIDE 0.4 MG: 0.2 INJECTION, SOLUTION INTRAMUSCULAR; INTRAVENOUS; SUBCUTANEOUS at 09:25

## 2022-09-16 RX ADMIN — METHOCARBAMOL 1000 MG: 500 TABLET, FILM COATED ORAL at 16:14

## 2022-09-16 RX ADMIN — Medication: at 01:33

## 2022-09-16 RX ADMIN — OXYCODONE HYDROCHLORIDE 5 MG: 5 TABLET ORAL at 18:41

## 2022-09-16 RX ADMIN — Medication: at 01:32

## 2022-09-16 RX ADMIN — HEPARIN SODIUM 5000 UNITS: 5000 INJECTION, SOLUTION INTRAVENOUS; SUBCUTANEOUS at 04:30

## 2022-09-16 RX ADMIN — MUPIROCIN 0.5 G: 20 OINTMENT TOPICAL at 20:00

## 2022-09-16 RX ADMIN — CYCLOBENZAPRINE 10 MG: 10 TABLET, FILM COATED ORAL at 20:01

## 2022-09-16 RX ADMIN — ACETAMINOPHEN 975 MG: 325 TABLET, FILM COATED ORAL at 04:30

## 2022-09-16 RX ADMIN — OXYCODONE HYDROCHLORIDE 5 MG: 5 TABLET ORAL at 12:50

## 2022-09-16 RX ADMIN — HYDROMORPHONE HYDROCHLORIDE 0.4 MG: 0.2 INJECTION, SOLUTION INTRAMUSCULAR; INTRAVENOUS; SUBCUTANEOUS at 19:21

## 2022-09-16 RX ADMIN — PANTOPRAZOLE SODIUM 40 MG: 40 TABLET, DELAYED RELEASE ORAL at 08:26

## 2022-09-16 RX ADMIN — POTASSIUM & SODIUM PHOSPHATES POWDER PACK 280-160-250 MG 1 PACKET: 280-160-250 PACK at 15:53

## 2022-09-16 RX ADMIN — OXYCODONE HYDROCHLORIDE 5 MG: 5 TABLET ORAL at 15:49

## 2022-09-16 RX ADMIN — POTASSIUM & SODIUM PHOSPHATES POWDER PACK 280-160-250 MG 1 PACKET: 280-160-250 PACK at 11:30

## 2022-09-16 RX ADMIN — HEPARIN SODIUM 5000 UNITS: 5000 INJECTION, SOLUTION INTRAVENOUS; SUBCUTANEOUS at 20:00

## 2022-09-16 RX ADMIN — FUROSEMIDE 20 MG: 10 INJECTION, SOLUTION INTRAMUSCULAR; INTRAVENOUS at 08:26

## 2022-09-16 RX ADMIN — ACETAMINOPHEN 975 MG: 325 TABLET, FILM COATED ORAL at 20:01

## 2022-09-16 RX ADMIN — METHOCARBAMOL 1000 MG: 500 TABLET ORAL at 09:32

## 2022-09-16 RX ADMIN — PAROXETINE 20 MG: 20 TABLET, FILM COATED ORAL at 08:26

## 2022-09-16 RX ADMIN — METHOCARBAMOL 1000 MG: 500 TABLET, FILM COATED ORAL at 22:13

## 2022-09-16 RX ADMIN — KETOROLAC TROMETHAMINE 15 MG: 15 INJECTION, SOLUTION INTRAMUSCULAR; INTRAVENOUS at 01:30

## 2022-09-16 RX ADMIN — ACETAMINOPHEN 975 MG: 325 TABLET, FILM COATED ORAL at 11:29

## 2022-09-16 RX ADMIN — Medication 12.5 MG: at 08:26

## 2022-09-16 RX ADMIN — SENNOSIDES AND DOCUSATE SODIUM 1 TABLET: 50; 8.6 TABLET ORAL at 20:01

## 2022-09-16 ASSESSMENT — ACTIVITIES OF DAILY LIVING (ADL)
ADLS_ACUITY_SCORE: 25

## 2022-09-16 NOTE — PROVIDER NOTIFICATION
2325: Pt has not voided since montiel removal at 1400. Bladder scanned for less than 100 mL. Was refusing to allow straight cath.     0000: Pt allowed writer to straight cath for 250 mL. Pushing PO fluids.    0510: Spoke with general surgery cross cover regarding low urine output; will continue to push PO fluids and day team will decide need for further IV fluid boluses.

## 2022-09-16 NOTE — PROGRESS NOTES
Cardiovascular Surgery Progress Note  09/16/2022         Assessment and Plan:     Luis Alberto Garcia is a 72 year old female with history of HTN, smoking, emphysema, GERD, fatty liver disease, migraines, anxiety, insomnia, aortic stenosis now s/p CABG x5 (LIMA-Diag, rSVG to LAD, PDA, AILIN, OM) with Dr. Pathak on 9/14.     Cardiovascular:   Severe multivessel CAD s/p CABG x5   Moderate aortic insufficiency and mild aortic stenosis  HTN  No arrhythmias overnight, HD stable, in NSR.   Most recent echo showed LVEF 65%  -  mg daily  - Patient does not want statin  - BB: Metoprolol tartrate 12.5 mg BID with hold parameters  - PTA Toprol, lisinopril, amlodipine, hydrochlorothiazide and spironolactone on hold  - Diuresis as below    Chest tubes: 570 ml out over last 24 hours. Will eval later today if they can be removed  TPW: CAP    Pulmonary:  Emphysema  Extubated POD 0 to 3 lpm via NC. Now saturating well on 1L NC  - Supplemental O2 PRN to keep sats > 92%. Wean off as tolerated.  - Pulm hygiene, IS, activity and deep breathing  - PTA albuterol inhaler resumed    Neurology / MSK:  Acute post-operative pain   Crying in severe 10/10 pain this morning on rounds: Increased oxycodone to 5-10 mg q3 prn, increased robaxin to 1000 mg scheduled q6, added toradol IV for an extra 2 doses, added ketamine 7.5 mg IV prn q2 hours and lidocaine patch to be placed on back where she complains of ma to get her pain under control  - B/L ES catheters in place managed per RAPS- will see if they can bolus today  - PTA paxil resumed  - PTA Celebrex on hold     / Renal / Fluid / Electrolytes:  Hypervolemia  BL creat ~ 0.8, most recent creatinine 0.9; low UP over last 24 hours 338ml  FLUID STATUS: Pre-op weight 180 lbs, weight today 194 lbs; I/O past 24 hours: +1.4L  - Diuresis: 20 IV lasix BID started 9/16  - Replete lytes per protocol  - Strict I/O, daily weights  - Avoid/limit nephrotoxins as able    GI / Nutrition:   GERD  Fatty liver  disease  - Tolerating clears, ADAT  - Continue bowel regimen, no BM yet, +flatus  - Increase bowel regimen today    Endocrine:  Stress induced hyperglycemia   - Initially managed on insulin drip postop, transitioned to sliding scale; goal BG <180 for optimal healing    Infectious Disease:  Stress induced leukocytosis  WBC up slightly at 14.8, remains afebrile, no signs or symptoms of infection  - Completed perioperative antibiotics  - Continue to monitor fever curve, CBC    Hematology:   Acute blood loss anemia  Hgb 8.2; Plt 167, no signs or symptoms of active bleeding  - CBC in AM    Anticoagulation:   - ASA only    MSK/Skin:  Sternotomy  Surgical incision  - Sternal precautions  - Incisional cares per protocol    Prophylaxis:   - Stress ulcer prophylaxis: Pantoprazole 40 mg daily  - DVT prophylaxis: Subcutaneous heparin, SCD    Disposition:   - Transfer to  on 9/15    Rounded with Dr. Tyler Wise PA-C   Cardiothoracic Surgery   September 16, 2022 at 7:34 AM        Interval History:     No overnight events. Slept well  Pain was not controlled this morning. States 10/10 back pain. Specific point just lateral to spine palpated with pain.   Tolerating clears, passing flatus, no BM. No nausea or vomiting.  Breathing well without complaints.   Denies chest pain, palpitations, dizziness, syncopal symptoms, fevers, chills, myalgias, or sternal popping/clicking.         Physical Exam:     Gen: A&Ox4, NAD  Neuro: Intact with no focal deficits   CV: RRR, normal S1 S2, no murmurs, rubs or gallops. no JVD  Pulm: CTA, no wheezing or rhonchi, normal breathing on 1L NC  Abd: nondistended, normal BS, soft, nontender  Ext: 2+ peripheral edema, mild pitting  Incision: clean, dry, intact, no erythema, sternum stable  Tubes/drain sites: dressing clean and dry, serosanguinous output, no air leak or crepitus           Data:    Imaging:  reviewed recent imaging, no acute concerns    No results found for this or any previous  visit (from the past 24 hour(s)).     Labs:  Recent Labs   Lab 09/16/22  0455 09/15/22  1206 09/15/22  0335 09/15/22  0332 09/14/22  2332 09/14/22 2026 09/14/22  1542 09/14/22  1540 09/14/22  1402 09/14/22  1400   WBC 14.8*  --   --  13.0*  --  15.6*  --  16.2*  --  17.9*   HGB 8.2*  --   --  9.1*  --  9.6*  --  10.3*   < > 9.0*   MCV 93  --   --  92  --  91  --  90  --  93     --   --  165  --  171  --  163  --  199   INR  --   --   --   --   --  1.30*  --  1.41*  --  1.48*   *  --   --  138  --  141  --  139   < >  --    POTASSIUM 4.9  --   --  5.1  --  3.8  --  4.5   < >  --    CHLORIDE 99  --   --  105  --  112*  --  107  --   --    CO2 28  --   --  25  --  20*  --  26  --   --    BUN 30.4*  --   --  21.1  --  15.4  --  17.6  --   --    CR 0.90  --   --  0.76  --  0.55  --  0.68  --   --    ANIONGAP 3*  --   --  8  --  9  --  6*  --   --    ALYCE 8.2*  --   --  8.2*  --  6.9*  --  9.4  --   --    * 123* 142* 150*   < > 185*   < > 142*   < >  --    ALBUMIN 3.2*  --   --  3.1*  --  2.5*   < >  --   --   --    PROTTOTAL 5.3*  --   --  4.8*  --  3.9*   < >  --   --   --    BILITOTAL 0.3  --   --  0.4  --  0.5   < >  --   --   --    ALKPHOS 51  --   --  51  --  45   < >  --   --   --    ALT 19  --   --  16  --  13   < >  --   --   --    AST 72*  --   --  48*  --  36*   < >  --   --   --     < > = values in this interval not displayed.      GLUCOSE:   Recent Labs   Lab 09/16/22  0455 09/15/22  1206 09/15/22  0335 09/15/22  0332 09/15/22  0239 09/15/22  0034   * 123* 142* 150* 135* 155*

## 2022-09-16 NOTE — PLAN OF CARE
"/60 (BP Location: Left arm, Cuff Size: Adult Regular)   Pulse 64   Temp 98  F (36.7  C) (Oral)   Resp 18   Ht 1.676 m (5' 6\")   Wt 87.7 kg (193 lb 5.5 oz)   SpO2 98%   BMI 31.21 kg/m      Shift events: Pain well controlled with q4 oxycodone and q6 toradol. Declining IV dilaudid overnight. Slept intermittently with PRN ambien x1. Due to void. New PIV due to occlusion of previous. 2 RN skin assessment performed by writereji and SARAHY Lin with no new deficits noted. Assessed under and changed sternal/chest tube dressings. Commode ordered for today. Standing up at bedside x2. Replace phosphorus in AM; no other replacements indicated.    Admission diagnoses: S/p CABGx5; POD 2.    Neuro: A&Ox4; bearing weight and moving BLE well.  CV: SR 60-65. -110s. Denies new chest pain, dizziness or SOB. External pacer in standby.  Resp: 1-2 L NC overnight; deep breathing and coughing encouraged.  GI: No BM since surgery; BS active with flatus.  : Gray removed yesterday in ICU; has not yet voided. Straight cathed x1. Team updated regarding low urine output. Creatinine/BUN trending up. Encouraging PO fluids.   Endo: Refusing blood sugar monitoring at HS. Education provided.  Skin: No new deficits noted.  LDA: PIV x1. Chest tube x4 at -20 with decreasing outputs. External pacer wires.    Plan: Increase activity today. Monitor UOP/voiding status. Possible chest tube removal today.     "

## 2022-09-16 NOTE — PLAN OF CARE
"/62 (BP Location: Left arm, Cuff Size: Adult Regular)   Pulse 64   Temp 98.5  F (36.9  C) (Axillary)   Resp 16   Ht 1.676 m (5' 6\")   Wt 88.3 kg (194 lb 10.7 oz)   SpO2 92%   BMI 31.42 kg/m    Hours of Care: 3p-7p  Neuro: A&Ox4.   Cardiac: SR. VSS. SBP . HR 62-64/.   Respiratory: Sating 93-96% on RA.  GI/: Adequate urine output. Pt experienced urgency after Lasix administration. No BM this shift. Using bedpan d/t pain with ambulation.  Activity:  Assist of 1. Pt was OOB to commode.  Pain: Controlled with oxycodone and muscle relaxants.  Ropivicaine infusing.  Skin: No new deficits noted.  LDA's: PIV x1. Chest tubes x4 to -20mmHg. External pacer wires.      Plan: Continue with POC. Continue monitoring and assessing pain regularly with medication administration as needed. Notify primary team with changes.     Papo Hoover RN on 9/16/2022 at 6:47 PM    "

## 2022-09-16 NOTE — PROGRESS NOTES
CLINICAL NUTRITION SERVICES  Reason for Assessment:  Nutrition education regarding heart-healthy diet recommendations s/o CABG x5   Diet History:  Follows a regular diet at baseline. Typically eats 3 meals per day. Breakfast may consist of a whole grain with fruit and coffee. Lunch might be a sandwich and more fruit. Dinner is typically a meat, vegetable, and another whole grain. Uses salt when cooking but plans to switch to [employer]ishol[employer]. This is her biggest opportunity area for change with a heart-healthy diet, as pt reports craving salt and adding it often when preparing foods. Physical activity includes walking her dogs 2x/day and frequently walking up and down stairs in her 3-story house.   Nutrition Diagnosis:  Food- and nutrition-related knowledge deficit r/t no previous knowledge of heart-healthy diet education recommendations.   Interventions:  Provided instruction on heart healthy meal planning; whole grains, fruits/vegetables, lean proteins, physical activity as able/approved by MD.   Provided handouts [facility] handout for Heart Healthy Guidelines   Goals:   Patient will verbalize understanding by summarizing heart healthy diet recommendations back to writer.   Follow-up:    Patient to ask any further nutrition-related questions before discharge.  In addition, pt may request outpatient RD appointment.    Jovan Swann RDN, LD, CNSC  6B RD pager: [phone] Phone: [phone]

## 2022-09-16 NOTE — PROGRESS NOTES
"Pain Service Progress Note  [facility]  Date: 09/16/2022       Patient Name: Luis Alberto Garcia  MRN: [id number]  Age: 72 year old  Sex: female      Assessment:  73 y/o female s/p CABG    Procedure: CABG    Date of Surgery: 9/14/22    Date of Catheter Placement: 9/14/22    Plan/Recommendations:  1. Regional Anesthesia/Analgesia  -Continuous Catheter Type/Site: Bilateral T4/5 ES catheters  Programmed Intermittent Bolus (PIB) at 7 mL Q60 min via each catheter, total infusion rate of 14 mL/hr    2. Anticoagulation  -Please contact Inpatient Pain Service before ordering or making any anticoagulation changes       3. Multimodal Analgesia  - Would recommend an alternative muscle relaxant as patient's primary complaint is upper back pain. There is tenderness to palpation along the lower trapezius muscle.    Pain Service will continue to follow.    Please Page the Pain Team Via Amcom: \"PAIN ACUTE INPATIENT PAIN MANAGEMENT ADULT / [facility]\"    Jimenez Vivar MD  09/16/2022     Overnight Events: None    Subjective:  My back pain is worse than before the surgery. The extra numbing medication didn't help.   Nausea: No  Vomiting: No  Pruritus: No  Symptoms of LAST: No    Pain Location:  Chest    Pain Intensity:    Pain at Rest: 6/10   Pain with Activity: 8/10  Comfort Goal: 4/10   Satisfied with your level of pain control: No    Diet: Advance Diet as Tolerated: Regular Diet Adult; 2 gm NA Diet; Low Saturated Fat Na <2400mg Diet    Relevant Labs:  Recent Labs   Lab Test 09/15/22  0332 09/14/22 2026   INR  --  1.30*    171   PTT  --  32   BUN 21.1 15.4       Physical Exam:  Vitals: /56   Pulse 64   Temp 99  F (37.2  C) (Oral)   Resp 20   Ht 1.676 m (5' 6\")   Wt 88.3 kg (194 lb 10.7 oz)   SpO2 96%   BMI 31.42 kg/m      Physical Exam:   Orientation:  Alert, oriented, and in no acute distress: Yes  Sedation: No    Catheter Site:   Catheter entry site is clean/dry/intact: Yes    Tender: " "No      Relevant Medications:  Current Pain Medications:  Medications related to Pain Management (From now, onward)    Start     Dose/Rate Route Frequency Ordered Stop    09/17/22 0000  acetaminophen (TYLENOL) tablet 650 mg         650 mg Oral EVERY 4 HOURS PRN 09/14/22 1632      09/15/22 2200  gabapentin (NEURONTIN) capsule 200 mg         200 mg Oral or Feeding Tube AT BEDTIME 09/14/22 1811      09/15/22 0800  aspirin (ASA) chewable tablet 162 mg         162 mg Oral or NG Tube DAILY 09/14/22 1632      09/15/22 0800  polyethylene glycol (MIRALAX) Packet 17 g         17 g Oral DAILY 09/14/22 1632 09/14/22 2100  Lidocaine (LIDOCARE) 4 % Patch 2 patch        \"And\" Linked Group Details    2 patch  over 12 Hours Transdermal EVERY 24 HOURS 2000 09/14/22 2049 09/14/22 2100  lidocaine patch in PLACE        \"And\" Linked Group Details     Transdermal EVERY 8 HOURS SCHEDULED 09/14/22 2049 09/14/22 2047  ketorolac (TORADOL) injection 15 mg         15 mg Intravenous EVERY 6 HOURS PRN 09/14/22 2048 09/16/22 2046 09/14/22 2000  senna-docusate (SENOKOT-S/PERICOLACE) 8.6-50 MG per tablet 1 tablet         1 tablet Oral or Feeding Tube 2 TIMES DAILY 09/14/22 1632 09/14/22 2000  methocarbamol (ROBAXIN) tablet 500 mg         500 mg Oral 4 TIMES DAILY 09/14/22 1811 09/14/22 1700  dexmedetomidine (PRECEDEX) 400 mcg in 0.9% sodium chloride 100 mL         0.2-0.7 mcg/kg/hr × 81.9 kg  4.1-14.3 mL/hr  Intravenous CONTINUOUS 09/14/22 1632 09/14/22 1700  acetaminophen (TYLENOL) tablet 975 mg         975 mg Oral EVERY 8 HOURS 09/14/22 1632 09/17/22 1159    09/14/22 1632  HYDROmorphone (DILAUDID) injection 0.2 mg        \"Or\" Linked Group Details    0.2 mg Intravenous EVERY 2 HOURS PRN 09/14/22 1632 09/14/22 1632  HYDROmorphone (DILAUDID) injection 0.4 mg        \"Or\" Linked Group Details    0.4 mg Intravenous EVERY 2 HOURS PRN 09/14/22 1632      09/14/22 1632  oxyCODONE (ROXICODONE) tablet 5 mg        \"Or\" " "Linked Group Details    5 mg Oral EVERY 4 HOURS PRN 09/14/22 1632      09/14/22 1632  oxyCODONE IR (ROXICODONE) half-tab 7.5 mg        \"Or\" Linked Group Details    7.5 mg Oral EVERY 4 HOURS PRN 09/14/22 1632      09/14/22 1632  magnesium hydroxide (MILK OF MAGNESIA) suspension 30 mL         30 mL Oral DAILY PRN 09/14/22 1632      09/14/22 1632  bisacodyl (DULCOLAX) suppository 10 mg         10 mg Rectal DAILY PRN 09/14/22 1632      09/14/22 1632  methocarbamol (ROBAXIN) half-tab 250 mg         250 mg Oral EVERY 6 HOURS PRN 09/14/22 1632      09/14/22 1632  lidocaine 1 % 0.1-1 mL         0.1-1 mL Other EVERY 1 HOUR PRN 09/14/22 1632      09/14/22 1632  lidocaine (LMX4) cream          Topical EVERY 1 HOUR PRN 09/14/22 1632      09/14/22 1600  propofol (DIPRIVAN) infusion         5-75 mcg/kg/min × 81.9 kg  2.5-36.9 mL/hr  Intravenous CONTINUOUS 09/14/22 1534      09/14/22 1600  fentaNYL (SUBLIMAZE) infusion          mcg/hr  0.5-4 mL/hr  Intravenous CONTINUOUS 09/14/22 1534      09/14/22 0930  ropivacaine 0.2% in NS perineural infusion simple          Perineural Continuous Nerve Block 09/14/22 0912      09/14/22 0930  ropivacaine 0.2% in NS perineural infusion simple          Perineural Continuous Nerve Block 09/14/22 0913            Primary Service Contacted with Recommendations? Yes      Time/Communication:  I personally spent 15 minutes with greater than 50% in consultation, education, counseling and coordination of care.  See note above for details on conversation.          "

## 2022-09-16 NOTE — PROGRESS NOTES
Regional Anesthesia and Pain Service    Luis Alberto Garcia was evaluated this evening, moderate pain across lateral chest and her mid back. Pain is localized between her shoulder blades near catheter sites and has been present since yesterday but feel worse. She denies any pain with AM bolus or with CADD pump boluses. Catheter sites are not TTP, no sight of infection, inflammation or displacement.  She states that this pain is chronic, but she has fallen behind on her pain regimen with being transferred 3x today. A 10 ml Bolus of Bupivicaine 0.25%, (5 mL via right and left catheters) was administered without complication at 1745. Catheters with negative aspirate before and during administration.  There were no immediate complications.  BP, P, and MAP stable. The patient and their nurse were informed of necessary vigilance over vital signs for 30 minutes following the bolus. Please reach out to the RAPS team for any questions or concerns     Kerrie Bennett MD  Anesthesiology, CA-2

## 2022-09-16 NOTE — PLAN OF CARE
Care provided from [id number]      Neuro: A&Ox4.   Cardiac: SR. VSS. SBP . HR 62-64/.   Respiratory: Sating 93-96% on RA. Will use 2L NC if needed.  GI/: Adequate urine output. Pt experienced urgency after Lasix administration. No BM this shift.  Diet/appetite: Tolerating 2g NA and low saturated fat diet. Pt ate approximately 75% of breakfast this AM.  Activity:  Assist of 1. Pt was OOB to commode.  Pain: Rating pain between 5-8. Oxycodone 5mg x2 and Dilauded 0.2mg x1. Pain aggravated by movement. Ropivacaine infusion x2.  Skin: No new deficits noted.  LDA's: PIV x1. Chest tubes x4 to -20mmHg. External pacer wires.     Plan: Continue with POC. Continue monitoring and assessing pain regularly with medication administration as needed. Notify primary team with changes.

## 2022-09-17 ENCOUNTER — APPOINTMENT (OUTPATIENT)
Dept: GENERAL RADIOLOGY | Facility: CLINIC | Age: 72
DRG: 236 | End: 2022-09-17
Attending: NURSE PRACTITIONER
Payer: COMMERCIAL

## 2022-09-17 LAB
ALBUMIN SERPL BCG-MCNC: 3 G/DL (ref 3.5–5.2)
ALP SERPL-CCNC: 51 U/L (ref 35–104)
ALT SERPL W P-5'-P-CCNC: 21 U/L (ref 10–35)
ANION GAP SERPL CALCULATED.3IONS-SCNC: 7 MMOL/L (ref 7–15)
AST SERPL W P-5'-P-CCNC: 56 U/L (ref 10–35)
BILIRUB SERPL-MCNC: 0.3 MG/DL
BUN SERPL-MCNC: 20.5 MG/DL (ref 8–23)
CALCIUM SERPL-MCNC: 8.1 MG/DL (ref 8.8–10.2)
CHLORIDE SERPL-SCNC: 103 MMOL/L (ref 98–107)
CREAT SERPL-MCNC: 0.64 MG/DL (ref 0.51–0.95)
DEPRECATED HCO3 PLAS-SCNC: 26 MMOL/L (ref 22–29)
ERYTHROCYTE [DISTWIDTH] IN BLOOD BY AUTOMATED COUNT: 14.5 % (ref 10–15)
GFR SERPL CREATININE-BSD FRML MDRD: >90 ML/MIN/1.73M2
GLUCOSE SERPL-MCNC: 106 MG/DL (ref 70–99)
HCT VFR BLD AUTO: 24.8 % (ref 35–47)
HGB BLD-MCNC: 8 G/DL (ref 11.7–15.7)
MAGNESIUM SERPL-MCNC: 2.3 MG/DL (ref 1.7–2.3)
MCH RBC QN AUTO: 30.1 PG (ref 26.5–33)
MCHC RBC AUTO-ENTMCNC: 32.3 G/DL (ref 31.5–36.5)
MCV RBC AUTO: 93 FL (ref 78–100)
PHOSPHATE SERPL-MCNC: 2.2 MG/DL (ref 2.5–4.5)
PLATELET # BLD AUTO: 194 10E3/UL (ref 150–450)
POTASSIUM SERPL-SCNC: 4.4 MMOL/L (ref 3.4–5.3)
POTASSIUM SERPL-SCNC: 4.4 MMOL/L (ref 3.4–5.3)
PROT SERPL-MCNC: 5.4 G/DL (ref 6.4–8.3)
RBC # BLD AUTO: 2.66 10E6/UL (ref 3.8–5.2)
SODIUM SERPL-SCNC: 136 MMOL/L (ref 136–145)
WBC # BLD AUTO: 10.7 10E3/UL (ref 4–11)

## 2022-09-17 PROCEDURE — 120N000003 HC R&B IMCU UMMC

## 2022-09-17 PROCEDURE — 250N000013 HC RX MED GY IP 250 OP 250 PS 637: Performed by: NURSE PRACTITIONER

## 2022-09-17 PROCEDURE — 84100 ASSAY OF PHOSPHORUS: CPT | Performed by: SURGERY

## 2022-09-17 PROCEDURE — 250N000013 HC RX MED GY IP 250 OP 250 PS 637: Performed by: SURGERY

## 2022-09-17 PROCEDURE — 85027 COMPLETE CBC AUTOMATED: CPT | Performed by: PHYSICIAN ASSISTANT

## 2022-09-17 PROCEDURE — 250N000011 HC RX IP 250 OP 636: Performed by: SURGERY

## 2022-09-17 PROCEDURE — 250N000013 HC RX MED GY IP 250 OP 250 PS 637: Performed by: STUDENT IN AN ORGANIZED HEALTH CARE EDUCATION/TRAINING PROGRAM

## 2022-09-17 PROCEDURE — 250N000011 HC RX IP 250 OP 636: Performed by: PHYSICIAN ASSISTANT

## 2022-09-17 PROCEDURE — 71045 X-RAY EXAM CHEST 1 VIEW: CPT | Mod: 26 | Performed by: RADIOLOGY

## 2022-09-17 PROCEDURE — 80053 COMPREHEN METABOLIC PANEL: CPT | Performed by: PHYSICIAN ASSISTANT

## 2022-09-17 PROCEDURE — 999N000065 XR CHEST PORT 1 VIEW

## 2022-09-17 PROCEDURE — 36415 COLL VENOUS BLD VENIPUNCTURE: CPT | Performed by: PHYSICIAN ASSISTANT

## 2022-09-17 PROCEDURE — 250N000013 HC RX MED GY IP 250 OP 250 PS 637: Performed by: PHYSICIAN ASSISTANT

## 2022-09-17 PROCEDURE — 83735 ASSAY OF MAGNESIUM: CPT | Performed by: SURGERY

## 2022-09-17 PROCEDURE — 99232 SBSQ HOSP IP/OBS MODERATE 35: CPT | Mod: GC | Performed by: ANESTHESIOLOGY

## 2022-09-17 PROCEDURE — 250N000013 HC RX MED GY IP 250 OP 250 PS 637

## 2022-09-17 PROCEDURE — 82040 ASSAY OF SERUM ALBUMIN: CPT | Performed by: PHYSICIAN ASSISTANT

## 2022-09-17 RX ORDER — METOPROLOL SUCCINATE 25 MG/1
25 TABLET, EXTENDED RELEASE ORAL DAILY
Status: DISCONTINUED | OUTPATIENT
Start: 2022-09-17 | End: 2022-09-19 | Stop reason: HOSPADM

## 2022-09-17 RX ADMIN — POTASSIUM & SODIUM PHOSPHATES POWDER PACK 280-160-250 MG 1 PACKET: 280-160-250 PACK at 08:53

## 2022-09-17 RX ADMIN — HEPARIN SODIUM 5000 UNITS: 5000 INJECTION, SOLUTION INTRAVENOUS; SUBCUTANEOUS at 11:45

## 2022-09-17 RX ADMIN — Medication 2.5 MG: at 23:42

## 2022-09-17 RX ADMIN — POLYETHYLENE GLYCOL 3350 17 G: 17 POWDER, FOR SOLUTION ORAL at 08:51

## 2022-09-17 RX ADMIN — OXYCODONE HYDROCHLORIDE 5 MG: 5 TABLET ORAL at 12:21

## 2022-09-17 RX ADMIN — OXYCODONE HYDROCHLORIDE 5 MG: 5 TABLET ORAL at 15:45

## 2022-09-17 RX ADMIN — POTASSIUM & SODIUM PHOSPHATES POWDER PACK 280-160-250 MG 1 PACKET: 280-160-250 PACK at 11:44

## 2022-09-17 RX ADMIN — ALBUTEROL SULFATE 2 PUFF: 90 AEROSOL, METERED RESPIRATORY (INHALATION) at 10:15

## 2022-09-17 RX ADMIN — OXYCODONE HYDROCHLORIDE 5 MG: 5 TABLET ORAL at 23:42

## 2022-09-17 RX ADMIN — HEPARIN SODIUM 5000 UNITS: 5000 INJECTION, SOLUTION INTRAVENOUS; SUBCUTANEOUS at 20:06

## 2022-09-17 RX ADMIN — HEPARIN SODIUM 5000 UNITS: 5000 INJECTION, SOLUTION INTRAVENOUS; SUBCUTANEOUS at 04:32

## 2022-09-17 RX ADMIN — PAROXETINE 20 MG: 20 TABLET, FILM COATED ORAL at 08:51

## 2022-09-17 RX ADMIN — METOPROLOL SUCCINATE 25 MG: 25 TABLET, EXTENDED RELEASE ORAL at 08:50

## 2022-09-17 RX ADMIN — ACETAMINOPHEN 975 MG: 325 TABLET, FILM COATED ORAL at 04:32

## 2022-09-17 RX ADMIN — METHOCARBAMOL 1000 MG: 500 TABLET, FILM COATED ORAL at 10:15

## 2022-09-17 RX ADMIN — ALBUTEROL SULFATE 2 PUFF: 90 AEROSOL, METERED RESPIRATORY (INHALATION) at 21:31

## 2022-09-17 RX ADMIN — GABAPENTIN 200 MG: 100 CAPSULE ORAL at 21:30

## 2022-09-17 RX ADMIN — METHOCARBAMOL 1000 MG: 500 TABLET, FILM COATED ORAL at 15:45

## 2022-09-17 RX ADMIN — POTASSIUM & SODIUM PHOSPHATES POWDER PACK 280-160-250 MG 1 PACKET: 280-160-250 PACK at 15:45

## 2022-09-17 RX ADMIN — METHOCARBAMOL 1000 MG: 500 TABLET, FILM COATED ORAL at 21:30

## 2022-09-17 RX ADMIN — FUROSEMIDE 20 MG: 10 INJECTION, SOLUTION INTRAMUSCULAR; INTRAVENOUS at 08:54

## 2022-09-17 RX ADMIN — OXYCODONE HYDROCHLORIDE 5 MG: 5 TABLET ORAL at 08:51

## 2022-09-17 RX ADMIN — METHOCARBAMOL 1000 MG: 500 TABLET, FILM COATED ORAL at 04:32

## 2022-09-17 RX ADMIN — OXYCODONE HYDROCHLORIDE 5 MG: 5 TABLET ORAL at 20:05

## 2022-09-17 RX ADMIN — OXYCODONE HYDROCHLORIDE 5 MG: 5 TABLET ORAL at 04:51

## 2022-09-17 RX ADMIN — MAGNESIUM HYDROXIDE 30 ML: 400 SUSPENSION ORAL at 11:45

## 2022-09-17 RX ADMIN — ASPIRIN 81 MG CHEWABLE TABLET 162 MG: 81 TABLET CHEWABLE at 08:50

## 2022-09-17 RX ADMIN — CYCLOBENZAPRINE 10 MG: 10 TABLET, FILM COATED ORAL at 08:50

## 2022-09-17 RX ADMIN — CYCLOBENZAPRINE 10 MG: 10 TABLET, FILM COATED ORAL at 13:56

## 2022-09-17 RX ADMIN — PANTOPRAZOLE SODIUM 40 MG: 40 TABLET, DELAYED RELEASE ORAL at 08:51

## 2022-09-17 RX ADMIN — SENNOSIDES AND DOCUSATE SODIUM 1 TABLET: 50; 8.6 TABLET ORAL at 08:51

## 2022-09-17 ASSESSMENT — ACTIVITIES OF DAILY LIVING (ADL)
ADLS_ACUITY_SCORE: 25

## 2022-09-17 NOTE — PROGRESS NOTES
Neuro: A&Ox4.   Cardiac: SR. VSS.   Respiratory: Sating >93 on 2L NC.  GI/: Adequate urine output. No BM this shift.  Diet/appetite: Tolerating low fat low sodium diet. Eating well.  Activity:  Assist of 1-2 for line management, up to commode.  Pain: Controlled with PRN oxy 5mg given x2, dilaudid 0.4mg x1, and scheduled muscle relaxants.  Skin: No new deficits noted.   LDA's: PIV SL. CTx4 -20mmHg. External pacer on standby. Bupivicaine epidural x2 @ 14mL/hr total, dressing reinforced.     Plan: Continue with POC. Possible CT removal 09/17. Notify primary team with changes.

## 2022-09-17 NOTE — PROGRESS NOTES
Cardiovascular Surgery Progress Note  09/17/2022         Assessment and Plan:     Luis Alberto Garcia is a 72 year old female with history of HTN, smoking, emphysema, GERD, fatty liver disease, migraines, anxiety, insomnia, aortic stenosis now s/p CABG x5 (LIMA-Diag, rSVG to LAD, PDA, AILIN, OM) with Dr. Pathak on 9/14.     POD 3    Cardiovascular:   Severe multivessel CAD s/p CABG x5   Moderate aortic insufficiency and mild aortic stenosis  HTN  No arrhythmias overnight, HD stable, in NSR.   Most recent echo showed LVEF 65%  -  mg daily  - Patient does not want statin  - BB: Metoprolol tartrate 12.5 mg BID with hold parameters, change to Toprol 25 mg daily  - PTA Toprol, lisinopril, amlodipine, hydrochlorothiazide and spironolactone on hold  - Diuresis as below    Chest tubes: left pleural with 270 ml out, mediastinal with 300 ml out over last 24 hours. Leave in to suction  TPW: CAP    Pulmonary:  Emphysema  Extubated POD 0 to 3 lpm via NC. Now saturating well on 1-2 L NC  - Supplemental O2 PRN to keep sats > 92%. Wean off as tolerated.  - Pulm hygiene, IS, activity and deep breathing  - PTA albuterol inhaler resumed  - Diuresis as below    Neurology / MSK:  Acute post-operative pain   POD 2: Crying in severe 10/10 pain this morning on rounds: Increased oxycodone to 5-10 mg q3 prn, increased robaxin to 1000 mg scheduled q6, added toradol IV for an extra 2 doses, added ketamine 7.5 mg IV prn q2 hours and lidocaine patch to be placed on back where she complains to get her pain under control  - B/L ES catheters in place managed per RAPS  - PTA paxil resumed  - PTA Celebrex on hold     / Renal / Fluid / Electrolytes:  Hypervolemia  BL creat ~ 0.8, most recent creatinine 0.64; adequate urine with diuresis  FLUID STATUS: Pre-op weight 180 lbs, weight today pending; I/O past 24 hours: -3.5L  - Diuresis: 20 IV lasix BID started 9/16, continue  - Replete lytes per protocol  - Strict I/O, daily weights  - Avoid/limit  nephrotoxins as able    GI / Nutrition:   GERD  Fatty liver disease  - Tolerating clears, ADAT  - Continue bowel regimen, no BM yet, +flatus  - Increased bowel regimen, add MoM x 2 doses    Endocrine:  Stress induced hyperglycemia   - Initially managed on insulin drip postop, transitioned to sliding scale; goal BG <180 for optimal healing    Infectious Disease:  Stress induced leukocytosis  WBC 10.7 and trending down, remains afebrile, no signs or symptoms of infection  - Completed perioperative antibiotics  - Continue to monitor fever curve, CBC    Hematology:   Acute blood loss anemia  Hgb 8; Plt 194, no signs or symptoms of active bleeding  - CBC in AM    Anticoagulation:   - ASA only    MSK/Skin:  Sternotomy  Surgical incision  - Sternal precautions  - Incisional cares per protocol    Prophylaxis:   - Stress ulcer prophylaxis: Pantoprazole 40 mg daily  - DVT prophylaxis: Subcutaneous heparin, SCD    Disposition:   - Transfer to  on 9/15    Rounded with Dr. Tyler Astudillo, APRN, ACNPC-AG, CCRN  Nurse Practitioner  Cardiothoracic Surgery  Pager: [phone]          Interval History:     No acute events overnight. States pain is well managed on current regimen, slept well. Breathing is stable on 2 LPM cannula, working with IS. Tolerating diet, is passing flatus, no BM yet, +flatus, no n/v. Denies chest pain, palpitations, dizziness, syncopal symptoms, chills, myalgias, sternal popping/clicking.         Physical Exam:     Gen: A&Ox4, NAD  Neuro: Intact with no focal deficits   CV: RRR, normal S1 S2, no murmurs, rubs or gallops. no JVD  Pulm: CTA, no wheezing or rhonchi, normal breathing on 2L NC  Abd: nondistended, normal BS, soft, nontender  Ext: 2+ peripheral edema, mild pitting  Incision: clean, dry, intact, no erythema, sternum stable  Tubes/drain sites: dressing clean and dry, serosanguinous output, no air leak or crepitus           Data:    Imaging:  reviewed recent imaging, no acute concerns    No  results found for this or any previous visit (from the past 24 hour(s)).     Labs:  Recent Labs   Lab 09/17/22  0609 09/16/22  2340 09/16/22  1616 09/16/22  1142 09/16/22  0455 09/15/22  0335 09/15/22  0332 09/14/22  2332 09/14/22 2026 09/14/22  1542 09/14/22  1540 09/14/22  1402 09/14/22  1400   WBC 10.7  --   --   --  14.8*  --  13.0*  --  15.6*  --  16.2*  --  17.9*   HGB 8.0*  --   --   --  8.2*  --  9.1*  --  9.6*  --  10.3*   < > 9.0*   MCV 93  --   --   --  93  --  92  --  91  --  90  --  93     --   --   --  167  --  165  --  171  --  163  --  199   INR  --   --   --   --   --   --   --   --  1.30*  --  1.41*  --  1.48*   NA  --   --   --   --  130*  --  138  --  141  --  139   < >  --    POTASSIUM  --   --   --   --  4.9  --  5.1  --  3.8  --  4.5   < >  --    CHLORIDE  --   --   --   --  99  --  105  --  112*  --  107  --   --    CO2  --   --   --   --  28  --  25  --  20*  --  26  --   --    BUN  --   --   --   --  30.4*  --  21.1  --  15.4  --  17.6  --   --    CR  --   --   --   --  0.90  --  0.76  --  0.55  --  0.68  --   --    ANIONGAP  --   --   --   --  3*  --  8  --  9  --  6*  --   --    ALYCE  --   --   --   --  8.2*  --  8.2*  --  6.9*  --  9.4  --   --    GLC  --  123* 99 107* 109*   < > 150*   < > 185*   < > 142*   < >  --    ALBUMIN  --   --   --   --  3.2*  --  3.1*  --  2.5*   < >  --   --   --    PROTTOTAL  --   --   --   --  5.3*  --  4.8*  --  3.9*   < >  --   --   --    BILITOTAL  --   --   --   --  0.3  --  0.4  --  0.5   < >  --   --   --    ALKPHOS  --   --   --   --  51  --  51  --  45   < >  --   --   --    ALT  --   --   --   --  19  --  16  --  13   < >  --   --   --    AST  --   --   --   --  72*  --  48*  --  36*   < >  --   --   --     < > = values in this interval not displayed.      GLUCOSE:   Recent Labs   Lab 09/16/22  2340 09/16/22  1616 09/16/22  1142 09/16/22  0455 09/15/22  1206 09/15/22  0335   * 99 107* 109* 123* 142*

## 2022-09-17 NOTE — PROGRESS NOTES
"Pain Service Progress Note  [facility]  Date: 09/17/2022       Patient Name: Luis Alberto Garcia  MRN: [id number]  Age: 72 year old  Sex: female      Assessment:  Luis Alberto Garcia is a 71 y/o F with PMHx of HTN, smoking, emphysema, GERD, fatty liver disease, migraines, anxiety, insomnia, aortic stenosis now s/p CABG x5 (LIMA-Diag, rSVG to LAD, PDA, AILIN, OM) with Dr. Pathak on 9/14.     Procedure: CABG    Date of Surgery: 9/14/22    Date of Catheter Placement: 9/14/22    Plan/Recommendations:  1. Regional Anesthesia/Analgesia  -Continuous Catheter Type/Site: Bilateral T4/5 ES catheters  -Programmed Intermittent Bolus (PIB) at 7 mL Q60 min via each catheter, total infusion rate of 14 mL/hr    2. Anticoagulation  -Heparin 5000U Subq  -Please contact Inpatient Pain Service before ordering or making any anticoagulation changes       3. Multimodal Analgesia  - Would recommend an alternative muscle relaxant as patient's primary complaint is upper back pain. There is tenderness to palpation along the lower trapezius muscle.    Pain Service will continue to follow.    Please Page the Pain Team Via Amcom: \"PAIN ACUTE INPATIENT PAIN MANAGEMENT ADULT / [facility]\"    James Hines MD  CA-3/PGY-5  Dept of Anesthesiology       Overnight Events: None    Subjective:  I had back pain prior to the surgery and I think the surgery made the pain worse. It is up near my trap\"   Nausea: No  Vomiting: No  Pruritus: No  Symptoms of LAST: No    Pain Location:  Back    Pain Intensity:    Pain at Rest: 0/10   Pain with Activity: 5/10  Comfort Goal: 0/10   Satisfied with your level of pain control: Yes    Diet: Advance Diet as Tolerated: Regular Diet Adult; 2 gm NA Diet; Low Saturated Fat Na <2400mg Diet    Relevant Labs:  Recent Labs   Lab Test 09/15/22  0332 09/14/22 2026   INR  --  1.30*    171   PTT  --  32   BUN 21.1 15.4       Physical Exam:  Vitals: /56 (BP Location: Left arm)   Pulse 64   Temp 36.9  C (98.4 " " F) (Oral)   Resp 16   Ht 1.676 m (5' 6\")   Wt 88.3 kg (194 lb 10.7 oz)   SpO2 95%   BMI 31.42 kg/m      Physical Exam:   Orientation:  Alert, oriented, and in no acute distress: Yes  Sedation: No    Catheter Site:   Catheter entry site is clean/dry/intact: Yes    Tender: No      Relevant Medications:  Current Pain Medications:  Medications related to Pain Management (From now, onward)    Start     Dose/Rate Route Frequency Ordered Stop    09/17/22 0000  acetaminophen (TYLENOL) tablet 650 mg         650 mg Oral EVERY 4 HOURS PRN 09/14/22 1632      09/15/22 2200  gabapentin (NEURONTIN) capsule 200 mg         200 mg Oral or Feeding Tube AT BEDTIME 09/14/22 1811      09/15/22 0800  aspirin (ASA) chewable tablet 162 mg         162 mg Oral or NG Tube DAILY 09/14/22 1632      09/15/22 0800  polyethylene glycol (MIRALAX) Packet 17 g         17 g Oral DAILY 09/14/22 1632 09/14/22 2100  Lidocaine (LIDOCARE) 4 % Patch 2 patch        \"And\" Linked Group Details    2 patch  over 12 Hours Transdermal EVERY 24 HOURS 2000 09/14/22 2049 09/14/22 2100  lidocaine patch in PLACE        \"And\" Linked Group Details     Transdermal EVERY 8 HOURS SCHEDULED 09/14/22 2049 09/14/22 2047  ketorolac (TORADOL) injection 15 mg         15 mg Intravenous EVERY 6 HOURS PRN 09/14/22 2048 09/16/22 2046 09/14/22 2000  senna-docusate (SENOKOT-S/PERICOLACE) 8.6-50 MG per tablet 1 tablet         1 tablet Oral or Feeding Tube 2 TIMES DAILY 09/14/22 1632 09/14/22 2000  methocarbamol (ROBAXIN) tablet 500 mg         500 mg Oral 4 TIMES DAILY 09/14/22 1811 09/14/22 1700  dexmedetomidine (PRECEDEX) 400 mcg in 0.9% sodium chloride 100 mL         0.2-0.7 mcg/kg/hr × 81.9 kg  4.1-14.3 mL/hr  Intravenous CONTINUOUS 09/14/22 1632 09/14/22 1700  acetaminophen (TYLENOL) tablet 975 mg         975 mg Oral EVERY 8 HOURS 09/14/22 1632 09/17/22 1159    09/14/22 1632  HYDROmorphone (DILAUDID) injection 0.2 mg        \"Or\" Linked Group " "Details    0.2 mg Intravenous EVERY 2 HOURS PRN 09/14/22 1632      09/14/22 1632  HYDROmorphone (DILAUDID) injection 0.4 mg        \"Or\" Linked Group Details    0.4 mg Intravenous EVERY 2 HOURS PRN 09/14/22 1632      09/14/22 1632  oxyCODONE (ROXICODONE) tablet 5 mg        \"Or\" Linked Group Details    5 mg Oral EVERY 4 HOURS PRN 09/14/22 1632      09/14/22 1632  oxyCODONE IR (ROXICODONE) half-tab 7.5 mg        \"Or\" Linked Group Details    7.5 mg Oral EVERY 4 HOURS PRN 09/14/22 1632      09/14/22 1632  magnesium hydroxide (MILK OF MAGNESIA) suspension 30 mL         30 mL Oral DAILY PRN 09/14/22 1632      09/14/22 1632  bisacodyl (DULCOLAX) suppository 10 mg         10 mg Rectal DAILY PRN 09/14/22 1632      09/14/22 1632  methocarbamol (ROBAXIN) half-tab 250 mg         250 mg Oral EVERY 6 HOURS PRN 09/14/22 1632      09/14/22 1632  lidocaine 1 % 0.1-1 mL         0.1-1 mL Other EVERY 1 HOUR PRN 09/14/22 1632      09/14/22 1632  lidocaine (LMX4) cream          Topical EVERY 1 HOUR PRN 09/14/22 1632      09/14/22 1600  propofol (DIPRIVAN) infusion         5-75 mcg/kg/min × 81.9 kg  2.5-36.9 mL/hr  Intravenous CONTINUOUS 09/14/22 1534 09/14/22 1600  fentaNYL (SUBLIMAZE) infusion          mcg/hr  0.5-4 mL/hr  Intravenous CONTINUOUS 09/14/22 1534      09/14/22 0930  ropivacaine 0.2% in NS perineural infusion simple          Perineural Continuous Nerve Block 09/14/22 0912      09/14/22 0930  ropivacaine 0.2% in NS perineural infusion simple          Perineural Continuous Nerve Block 09/14/22 0913            Primary Service Contacted with Recommendations? Yes      Time/Communication:  I personally spent 15 minutes with greater than 50% in consultation, education, counseling and coordination of care.  See note above for details on conversation.          "

## 2022-09-17 NOTE — PLAN OF CARE
"/62 (BP Location: Left arm, Cuff Size: Adult Regular)   Pulse 64   Temp 98.5  F (36.9  C) (Axillary)   Resp 16   Ht 1.676 m (5' 6\")   Wt 88.3 kg (194 lb 10.7 oz)   SpO2 92%   BMI 31.42 kg/m      Hours of Care: 7a-7p    Neuro: A&Ox4.   Cardiac: SR. VSS. SBP . HR 62-64/.   Respiratory: Sating 93-96% on RA.  GI/: Adequate urine output. Had several loose stools.  First BM after surgery was today.  Activity:  Assist of 1. Pt was OOB to commode and bathroom.  Pain: Controlled with oxycodone and muscle relaxants.  Skin: No new deficits noted.  LDA's: PIV x1.      Papo Hoover RN on 9/17/2022 at 6:42 PM    "

## 2022-09-17 NOTE — ADDENDUM NOTE
Addendum  created 09/17/22 1345 by Esteban Kim MD    Charge Capture section accepted, Cosign clinical note with attestation

## 2022-09-18 ENCOUNTER — APPOINTMENT (OUTPATIENT)
Dept: OCCUPATIONAL THERAPY | Facility: CLINIC | Age: 72
DRG: 236 | End: 2022-09-18
Attending: SURGERY
Payer: COMMERCIAL

## 2022-09-18 ENCOUNTER — APPOINTMENT (OUTPATIENT)
Dept: GENERAL RADIOLOGY | Facility: CLINIC | Age: 72
DRG: 236 | End: 2022-09-18
Attending: NURSE PRACTITIONER
Payer: COMMERCIAL

## 2022-09-18 LAB
ANION GAP SERPL CALCULATED.3IONS-SCNC: 8 MMOL/L (ref 7–15)
BUN SERPL-MCNC: 14.9 MG/DL (ref 8–23)
CALCIUM SERPL-MCNC: 8.3 MG/DL (ref 8.8–10.2)
CHLORIDE SERPL-SCNC: 103 MMOL/L (ref 98–107)
CREAT SERPL-MCNC: 0.61 MG/DL (ref 0.51–0.95)
DEPRECATED HCO3 PLAS-SCNC: 25 MMOL/L (ref 22–29)
ERYTHROCYTE [DISTWIDTH] IN BLOOD BY AUTOMATED COUNT: 14.6 % (ref 10–15)
GFR SERPL CREATININE-BSD FRML MDRD: >90 ML/MIN/1.73M2
GLUCOSE SERPL-MCNC: 106 MG/DL (ref 70–99)
HCT VFR BLD AUTO: 24.2 % (ref 35–47)
HCV RNA SERPL NAA+PROBE-ACNC: NOT DETECTED IU/ML
HGB BLD-MCNC: 7.9 G/DL (ref 11.7–15.7)
MAGNESIUM SERPL-MCNC: 2.1 MG/DL (ref 1.7–2.3)
MCH RBC QN AUTO: 29.6 PG (ref 26.5–33)
MCHC RBC AUTO-ENTMCNC: 32.6 G/DL (ref 31.5–36.5)
MCV RBC AUTO: 91 FL (ref 78–100)
PHOSPHATE SERPL-MCNC: 2.5 MG/DL (ref 2.5–4.5)
PLATELET # BLD AUTO: 239 10E3/UL (ref 150–450)
POTASSIUM SERPL-SCNC: 4.2 MMOL/L (ref 3.4–5.3)
RBC # BLD AUTO: 2.67 10E6/UL (ref 3.8–5.2)
SODIUM SERPL-SCNC: 136 MMOL/L (ref 136–145)
WBC # BLD AUTO: 8.8 10E3/UL (ref 4–11)

## 2022-09-18 PROCEDURE — 250N000011 HC RX IP 250 OP 636: Performed by: SURGERY

## 2022-09-18 PROCEDURE — 82310 ASSAY OF CALCIUM: CPT | Performed by: NURSE PRACTITIONER

## 2022-09-18 PROCEDURE — 250N000013 HC RX MED GY IP 250 OP 250 PS 637: Performed by: STUDENT IN AN ORGANIZED HEALTH CARE EDUCATION/TRAINING PROGRAM

## 2022-09-18 PROCEDURE — 5A1221Z PERFORMANCE OF CARDIAC OUTPUT, CONTINUOUS: ICD-10-PCS | Performed by: SURGERY

## 2022-09-18 PROCEDURE — 02100Z9 BYPASS CORONARY ARTERY, ONE ARTERY FROM LEFT INTERNAL MAMMARY, OPEN APPROACH: ICD-10-PCS | Performed by: SURGERY

## 2022-09-18 PROCEDURE — 84100 ASSAY OF PHOSPHORUS: CPT | Performed by: SURGERY

## 2022-09-18 PROCEDURE — 36415 COLL VENOUS BLD VENIPUNCTURE: CPT | Performed by: NURSE PRACTITIONER

## 2022-09-18 PROCEDURE — 120N000003 HC R&B IMCU UMMC

## 2022-09-18 PROCEDURE — 250N000011 HC RX IP 250 OP 636

## 2022-09-18 PROCEDURE — 06BQ4ZZ EXCISION OF LEFT SAPHENOUS VEIN, PERCUTANEOUS ENDOSCOPIC APPROACH: ICD-10-PCS | Performed by: SURGERY

## 2022-09-18 PROCEDURE — 85027 COMPLETE CBC AUTOMATED: CPT | Performed by: PHYSICIAN ASSISTANT

## 2022-09-18 PROCEDURE — 250N000013 HC RX MED GY IP 250 OP 250 PS 637: Performed by: PHYSICIAN ASSISTANT

## 2022-09-18 PROCEDURE — 250N000013 HC RX MED GY IP 250 OP 250 PS 637: Performed by: THORACIC SURGERY (CARDIOTHORACIC VASCULAR SURGERY)

## 2022-09-18 PROCEDURE — 250N000013 HC RX MED GY IP 250 OP 250 PS 637

## 2022-09-18 PROCEDURE — 021309W BYPASS CORONARY ARTERY, FOUR OR MORE ARTERIES FROM AORTA WITH AUTOLOGOUS VENOUS TISSUE, OPEN APPROACH: ICD-10-PCS | Performed by: SURGERY

## 2022-09-18 PROCEDURE — 250N000013 HC RX MED GY IP 250 OP 250 PS 637: Performed by: SURGERY

## 2022-09-18 PROCEDURE — 71046 X-RAY EXAM CHEST 2 VIEWS: CPT

## 2022-09-18 PROCEDURE — 97530 THERAPEUTIC ACTIVITIES: CPT | Mod: GO

## 2022-09-18 PROCEDURE — 83735 ASSAY OF MAGNESIUM: CPT | Performed by: NURSE PRACTITIONER

## 2022-09-18 PROCEDURE — 250N000013 HC RX MED GY IP 250 OP 250 PS 637: Performed by: NURSE PRACTITIONER

## 2022-09-18 PROCEDURE — 250N000011 HC RX IP 250 OP 636: Performed by: NURSE PRACTITIONER

## 2022-09-18 PROCEDURE — 71046 X-RAY EXAM CHEST 2 VIEWS: CPT | Mod: 26 | Performed by: RADIOLOGY

## 2022-09-18 RX ORDER — FUROSEMIDE 10 MG/ML
20 INJECTION INTRAMUSCULAR; INTRAVENOUS ONCE
Status: COMPLETED | OUTPATIENT
Start: 2022-09-18 | End: 2022-09-18

## 2022-09-18 RX ORDER — FUROSEMIDE 10 MG/ML
20 INJECTION INTRAMUSCULAR; INTRAVENOUS DAILY
Status: DISCONTINUED | OUTPATIENT
Start: 2022-09-18 | End: 2022-09-19 | Stop reason: HOSPADM

## 2022-09-18 RX ADMIN — HEPARIN SODIUM 5000 UNITS: 5000 INJECTION, SOLUTION INTRAVENOUS; SUBCUTANEOUS at 12:11

## 2022-09-18 RX ADMIN — Medication 2.5 MG: at 23:19

## 2022-09-18 RX ADMIN — FUROSEMIDE 20 MG: 10 INJECTION, SOLUTION INTRAMUSCULAR; INTRAVENOUS at 09:54

## 2022-09-18 RX ADMIN — CYCLOBENZAPRINE 10 MG: 10 TABLET, FILM COATED ORAL at 09:08

## 2022-09-18 RX ADMIN — METHOCARBAMOL 1000 MG: 500 TABLET, FILM COATED ORAL at 22:13

## 2022-09-18 RX ADMIN — ALBUTEROL SULFATE 2 PUFF: 90 AEROSOL, METERED RESPIRATORY (INHALATION) at 22:13

## 2022-09-18 RX ADMIN — ALBUTEROL SULFATE 2 PUFF: 90 AEROSOL, METERED RESPIRATORY (INHALATION) at 12:46

## 2022-09-18 RX ADMIN — METOPROLOL SUCCINATE 25 MG: 25 TABLET, EXTENDED RELEASE ORAL at 09:08

## 2022-09-18 RX ADMIN — METHOCARBAMOL 1000 MG: 500 TABLET, FILM COATED ORAL at 16:23

## 2022-09-18 RX ADMIN — ALBUTEROL SULFATE 2 PUFF: 90 AEROSOL, METERED RESPIRATORY (INHALATION) at 16:25

## 2022-09-18 RX ADMIN — POTASSIUM & SODIUM PHOSPHATES POWDER PACK 280-160-250 MG 1 PACKET: 280-160-250 PACK at 12:11

## 2022-09-18 RX ADMIN — OXYCODONE HYDROCHLORIDE 5 MG: 5 TABLET ORAL at 23:19

## 2022-09-18 RX ADMIN — CYCLOBENZAPRINE 10 MG: 10 TABLET, FILM COATED ORAL at 19:36

## 2022-09-18 RX ADMIN — FUROSEMIDE 20 MG: 10 INJECTION, SOLUTION INTRAMUSCULAR; INTRAVENOUS at 19:35

## 2022-09-18 RX ADMIN — HEPARIN SODIUM 5000 UNITS: 5000 INJECTION, SOLUTION INTRAVENOUS; SUBCUTANEOUS at 04:22

## 2022-09-18 RX ADMIN — HYDROMORPHONE HYDROCHLORIDE 0.4 MG: 0.2 INJECTION, SOLUTION INTRAMUSCULAR; INTRAVENOUS; SUBCUTANEOUS at 21:07

## 2022-09-18 RX ADMIN — HEPARIN SODIUM 5000 UNITS: 5000 INJECTION, SOLUTION INTRAVENOUS; SUBCUTANEOUS at 19:35

## 2022-09-18 RX ADMIN — METHOCARBAMOL 1000 MG: 500 TABLET, FILM COATED ORAL at 04:22

## 2022-09-18 RX ADMIN — PANTOPRAZOLE SODIUM 40 MG: 40 TABLET, DELAYED RELEASE ORAL at 09:09

## 2022-09-18 RX ADMIN — ASPIRIN 81 MG CHEWABLE TABLET 162 MG: 81 TABLET CHEWABLE at 09:08

## 2022-09-18 RX ADMIN — POTASSIUM & SODIUM PHOSPHATES POWDER PACK 280-160-250 MG 1 PACKET: 280-160-250 PACK at 14:21

## 2022-09-18 RX ADMIN — OXYCODONE HYDROCHLORIDE 5 MG: 5 TABLET ORAL at 14:21

## 2022-09-18 RX ADMIN — GABAPENTIN 200 MG: 100 CAPSULE ORAL at 22:13

## 2022-09-18 RX ADMIN — OXYCODONE HYDROCHLORIDE 5 MG: 5 TABLET ORAL at 09:54

## 2022-09-18 RX ADMIN — CYCLOBENZAPRINE 10 MG: 10 TABLET, FILM COATED ORAL at 14:21

## 2022-09-18 RX ADMIN — OXYCODONE HYDROCHLORIDE 5 MG: 5 TABLET ORAL at 05:03

## 2022-09-18 RX ADMIN — METHOCARBAMOL 1000 MG: 500 TABLET, FILM COATED ORAL at 09:08

## 2022-09-18 RX ADMIN — PAROXETINE 20 MG: 20 TABLET, FILM COATED ORAL at 09:09

## 2022-09-18 RX ADMIN — OXYCODONE HYDROCHLORIDE 5 MG: 5 TABLET ORAL at 18:08

## 2022-09-18 ASSESSMENT — ACTIVITIES OF DAILY LIVING (ADL)
ADLS_ACUITY_SCORE: 25
ADLS_ACUITY_SCORE: 24
ADLS_ACUITY_SCORE: 25
ADLS_ACUITY_SCORE: 24
ADLS_ACUITY_SCORE: 25
ADLS_ACUITY_SCORE: 24
ADLS_ACUITY_SCORE: 24
ADLS_ACUITY_SCORE: 25
ADLS_ACUITY_SCORE: 24
ADLS_ACUITY_SCORE: 24

## 2022-09-18 NOTE — PROGRESS NOTES
Cardiovascular Surgery Progress Note  09/18/2022         Assessment and Plan:     Luis Alberto Garcia is a 72 year old female with history of HTN, smoking, emphysema, GERD, fatty liver disease, migraines, anxiety, insomnia, aortic stenosis now s/p CABG x5 (LIMA-Diag, rSVG to LAD, PDA, AILIN, OM) with Dr. Pathak on 9/14.     POD 4    Cardiovascular:   Severe multivessel CAD s/p CABG x5   Moderate aortic insufficiency and mild aortic stenosis  HTN  No arrhythmias overnight, HD stable, in NSR.   Most recent echo showed LVEF 65%  -  mg daily  - Patient does not want statin  - BB: Metoprolol tartrate 12.5 mg BID with hold parameters, changed to Toprol 25 mg daily  - PTA Toprol, lisinopril, amlodipine, hydrochlorothiazide and spironolactone on hold  - Diuresis as below    Chest tubes: left pleural with 270 ml out, mediastinal with 300 ml out over last 24 hours. Leave in to suction  TPW: CAP    Pulmonary:  Emphysema  Extubated POD 0 to 3 lpm via NC. Now saturating well on room air  - Supplemental O2 PRN to keep sats > 92%. Wean off as tolerated.  - Pulm hygiene, IS, activity and deep breathing  - PTA albuterol inhaler resumed  - Diuresis as below  - CXR post CT pull stable    Neurology / MSK:  Acute post-operative pain   POD 2: Crying in severe 10/10 pain this morning on rounds: Increased oxycodone to 5-10 mg q3 prn, increased robaxin to 1000 mg scheduled q6, added toradol IV for an extra 2 doses, added ketamine 7.5 mg IV prn q2 hours and lidocaine patch to be placed on back where she complains to get her pain under control.  9/17: pain much better controlled  - B/L ES catheters in place managed per RAPS  - PTA paxil resumed  - PTA Celebrex on hold     / Renal / Fluid / Electrolytes:  Hypervolemia  BL creat ~ 0.8, most recent creatinine 0.61; adequate urine with diuresis  FLUID STATUS: Pre-op weight 180 lbs, weight today 190 lbs; I/O past 24 hours: -1.5L  - Diuresis: 20 IV lasix BID started 9/16, refusing PM doses,  changed to daily  - Replete lytes per protocol  - Strict I/O, daily weights  - Avoid/limit nephrotoxins as able    GI / Nutrition:   GERD  Fatty liver disease  - Tolerating diet  - Continue bowel regimen, + BM 9/17  - Continue bowel regimen    Endocrine:  Stress induced hyperglycemia   - Initially managed on insulin drip postop, transitioned to sliding scale, now discontinued as euglycemic; goal BG <180 for optimal healing    Infectious Disease:  Stress induced leukocytosis  WBC 8.8 and trending down, remains afebrile, no signs or symptoms of infection  - Completed perioperative antibiotics  - Continue to monitor fever curve, CBC    Hematology:   Acute blood loss anemia  Hgb 7.9; Plt 239, no signs or symptoms of active bleeding  - CBC in AM    Anticoagulation:   - ASA only    MSK/Skin:  Sternotomy  Surgical incision  - Sternal precautions  - Incisional cares per protocol    Prophylaxis:   - Stress ulcer prophylaxis: Pantoprazole 40 mg daily  - DVT prophylaxis: Subcutaneous heparin, SCD    Disposition:   - Transfer to  on 9/15  - Therapies recommending discharge to home, likely tomorrow, 9/19    Rounded with Dr. Tyler Astudillo, APRN, ACN-AG, CCRN  Nurse Practitioner  Cardiothoracic Surgery  Pager: [phone]          Interval History:     No acute events overnight. States pain is well managed on current regimen, slept very well. Breathing is stable on room air, working with IS. Tolerating diet, is passing flatus, +BM, no n/v. Denies chest pain, palpitations, dizziness, syncopal symptoms, chills, myalgias, sternal popping/clicking.         Physical Exam:     Gen: A&Ox4, NAD  Neuro: Intact with no focal deficits   CV: RRR, normal S1 S2, no murmurs, rubs or gallops. no JVD  Pulm: CTA, no wheezing or rhonchi, normal breathing on 2L NC  Abd: nondistended, normal BS, soft, nontender  Ext: 1+ peripheral edema, mild pitting  Incision: clean, dry, intact, no erythema, sternum stable  Tubes/drain sites: dressing  clean and dry           Data:    Imaging:  reviewed recent imaging, no acute concerns    Recent Results (from the past 24 hour(s))   XR Chest Port 1 View    Narrative    EXAM: XR CHEST PORT 1 VIEW  9/17/2022 12:25 PM     HISTORY:  s/p CT removal       COMPARISON:  Chest x-ray 9/15/2022    TECHNIQUE: Portable AP semiupright view of the chest    FINDINGS:   Bibasilar chest tubes and mediastinal drains have been removed. Right  IJ CVC has been removed. Spinal analgesic catheter removed. Median  sternotomy wires.    The trachea is midline. The cardiomediastinal silhouette is stable.  Linear opacity in the left apex suspicious for trace pneumothorax.  Trace left pleural effusion. No significant change in the hazy  perihilar and bibasilar opacities. No focal airspace opacity. No acute  osseous abnormality.      Impression    IMPRESSION:  1. Question small left pneumothorax status post chest tube removal.  Recommend follow up.  2. Stable trace left pleural effusion and bilateral hazy perihilar and  basilar atelectasis versus edema.    I have personally reviewed the examination and initial interpretation  and I agree with the findings.    TYSON LOWE MD         SYSTEM ID:  [id number]        Labs:  Recent Labs   Lab 09/18/22  0602 09/17/22  0609 09/16/22  2340 09/16/22  1616 09/16/22  1142 09/16/22  0455 09/15/22  0335 09/15/22  0332 09/14/22  2332 09/14/22  2026 09/14/22  1542 09/14/22  1540 09/14/22  1402 09/14/22  1400   WBC 8.8 10.7  --   --   --  14.8*  --  13.0*  --  15.6*  --  16.2*  --  17.9*   HGB 7.9* 8.0*  --   --   --  8.2*  --  9.1*  --  9.6*  --  10.3*   < > 9.0*   MCV 91 93  --   --   --  93  --  92  --  91  --  90  --  93    194  --   --   --  167  --  165  --  171  --  163  --  199   INR  --   --   --   --   --   --   --   --   --  1.30*  --  1.41*  --  1.48*   NA  --  136  --   --   --  130*  --  138  --  141  --  139   < >  --    POTASSIUM  --  4.4  4.4  --   --   --  4.9  --  5.1  --  3.8  --   4.5   < >  --    CHLORIDE  --  103  --   --   --  99  --  105  --  112*  --  107  --   --    CO2  --  26  --   --   --  28  --  25  --  20*  --  26  --   --    BUN  --  20.5  --   --   --  30.4*  --  21.1  --  15.4  --  17.6  --   --    CR  --  0.64  --   --   --  0.90  --  0.76  --  0.55  --  0.68  --   --    ANIONGAP  --  7  --   --   --  3*  --  8  --  9  --  6*  --   --    ALYCE  --  8.1*  --   --   --  8.2*  --  8.2*  --  6.9*  --  9.4  --   --    GLC  --  106* 123* 99   < > 109*   < > 150*   < > 185*   < > 142*   < >  --    ALBUMIN  --  3.0*  --   --   --  3.2*  --  3.1*  --  2.5*   < >  --   --   --    PROTTOTAL  --  5.4*  --   --   --  5.3*  --  4.8*  --  3.9*   < >  --   --   --    BILITOTAL  --  0.3  --   --   --  0.3  --  0.4  --  0.5   < >  --   --   --    ALKPHOS  --  51  --   --   --  51  --  51  --  45   < >  --   --   --    ALT  --  21  --   --   --  19  --  16  --  13   < >  --   --   --    AST  --  56*  --   --   --  72*  --  48*  --  36*   < >  --   --   --     < > = values in this interval not displayed.      GLUCOSE:   Recent Labs   Lab 09/17/22  0609 09/16/22  2340 09/16/22  1616 09/16/22  1142 09/16/22  0455 09/15/22  1206   * 123* 99 107* 109* 123*

## 2022-09-18 NOTE — PROGRESS NOTES
Neuro: A&Ox4.   Cardiac: SR. VSS.       Respiratory: Sating >93 on 2L NC.  GI/: Adequate urine output. No BM this shift.  Diet/appetite: Tolerating low fat low sodium diet. Eating well.  Activity:  Assist of 1-2 for line management, up to commode.  Pain: Controlled with PRN oxy 5mg given x2.  Skin: No new deficits noted.   LDA's: PIV SL.      Plan: Continue with POC. Possible discharge 09/18. Notify primary team with changes

## 2022-09-18 NOTE — PLAN OF CARE
D: S/p CABG x5 9/14. Hx. HTN, smoking, emphysema, GERD, fatty liver disease, migraines, anxiety, insomnia, aortic stenosis.  I/A: A&Ox4. VSSA on RA. LOPEZ. SR 70s. C/o incx and back pain partially relieved by scheduled robaxin and prn oxycodone. Adequate uop; Pt deferred PM lasix dose. SBA. Appeared to rest comfortably this evening.   P: Continue to monitor and notify CVTS with questions/concerns.     Hours of care: [id number].

## 2022-09-18 NOTE — OP NOTE
Procedure Date: 09/14/2022    PREOPERATIVE DIAGNOSES:  1.  Coronary artery disease.  2.  Mild-to-moderate aortic regurgitation.    POSTOPERATIVE DIAGNOSES:    1.  Coronary artery disease.  2.  Mild-to-moderate aortic regurgitation.    PROCEDURE:   1.  Coronary artery bypass grafting x 5 (left internal mammary artery to first diagonal artery, saphenous vein graft to left anterior descending artery, saphenous vein graft to first obtuse marginal artery, saphenous vein graft to second obtuse marginal artery, saphenous vein graft to distal right coronary artery).  2.  Endoscopic vein harvest.    SURGEON:  Galo Pathak MD.    ASSISTANTS:  Harrison Sarah MD, Gabby King MD.    OPERATIVE INDICATIONS:  The patient is a 72-year-old female with severe triple-vessel coronary artery disease.  Decision was made to proceed with coronary bypass grafting.  I discussed risks and benefits of surgery with the patient's family, including risk of death, stroke, bleeding, wound infection, renal failure, arrhythmias.  They understood and agreed to proceed with surgery.  The patient also has mild-to-moderate aortic regurgitation, which we decided to leave alone.    OPERATIVE FINDINGS:  The patient's sternum was of adequate quality.  Pericardial space was free of any adhesions.  Veins obtained were adequate for bypass grafting.  Left internal mammary artery was of adequate quality, good flow.  Aorta was grossly free of plaques.  Vessels bypassed included the distal left anterior descending artery, which was a relatively smaller vessel, approximately 1.75 mm diameter, the first diagonal artery, which is a large 2.5 mm vessel with proximal stenosis, as well as a first and second obtuse marginal artery, both 2 mm in diameter, proximal stenosis, and the distal right coronary artery, which had proximal stenosis.    DESCRIPTION OF PROCEDURE:  The patient was brought to the operating room in stable condition for the administration of general  anesthesia.  The patient's chest, abdomen and lower extremities were prepped and draped in the usual manner.  A long segment of saphenous vein was harvested from the left lower extremity from the left greater saphenous vein using endoscopic vein harvest techniques.  Simultaneously, a median sternotomy was performed.  The left internal mammary artery was harvested from its bed and dilated with topical papaverine.  Preparation of cardiopulmonary bypass included ACT-guided heparinization and the admission of [place].  The aorta was cannulated with a 20-Azeri arterial cannula via a 3-0 Tevdek pursestring pledgeted suture x 2. Dual stage venous cannula was inserted into the right atrium.  Antegrade and retrograde cardioplegia were placed.  Full cardiopulmonary bypass was initiated.  Aortic pressure was temporarily reduced and the aorta was crossclamped.  One liter of cold blood cardioplegia was administered with antegrade and retrograde routes.  Subsequent doses of cardioplegia were given at no greater than 20-minute intervals, via the retrograde route, as well as via the completed vein grafts.  Reversal of saphenous vein was anastomosed end-to-side to an arteriotomy in the distal portion of the right coronary artery using 7-0 Prolene.  Second reversal of saphenous vein was anastomosed end-to-side to an arteriotomy in the mid portion of the second obtuse marginal artery using 7-0 Prolene.  Third reversal of saphenous vein was anastomosed end-to-side to an arteriotomy in the mid portion of the first obtuse marginal artery using 7-0 Prolene.  Both reversals of saphenous veins was anastomosed end-to-side to an arteriotomy in the [id number] portion of the left anterior descending artery using 7-0 Prolene.  Distal end of the left internal mammary artery was anastomosed end-to-side to an arteriotomy in the mid portion of the first diagonal artery using 7-0 Prolene.  Proximal ends of the four vein grafts were anastomosed to  4 mm aortotomies using 6-0 Prolene.  Warm dose of retrograde hotshot cardioplegia was given, and with high suction on the aortic root vent, the cross-clamp was released.  Organized rhythm resumed without the need for defibrillations.  There was some distention, so we placed a pulmonary vent for several minutes, the cement was subsequently removed and closed with 4-0 Prolene pledgeted suture.  The patient was gradually weaned off cardiopulmonary bypass with low dose epinephrine Following termination ofcardiopulmonary bypass, LV function within normal limits and the patient had normal sinus electrocardiogram.  Protamine was given.  All cannulas removed.  Careful hemostasis was obtained.  Two anterior mediastinal and 1 left pleural chest tube was placed.  Sternum was closed with surgical steel wires.  Fascia, subcutaneous tissue, skin of chest were closed in layers.  The patient was transferred to ICU in stable, but critical condition.    Galo Pathak MD        D: 2022   T: 2022   MT: [id number]    Name:     SOCORRO POMPA FERNANDO  MRN:      [id number]        Account:        [id number]   :      1950           Procedure Date: 2022     Document: [id number]

## 2022-09-18 NOTE — PLAN OF CARE
"/57 (BP Location: Left arm, Cuff Size: Adult Regular)   Pulse 71   Temp 98  F (36.7  C) (Oral)   Resp 16   Ht 1.676 m (5' 6\")   Wt 86.4 kg (190 lb 7.6 oz)   SpO2 94%   BMI 30.74 kg/m      Hours of Care: [id number].    Reason for admission: CABG x 5 on 9/14  Vitals: VSS.   Activity: Up in halls three to four times today with standby assist.  Pain: Pain to surgical incision controlled with 5 mg prn oxycodone and scheduled tylenol and robaxin.  Neuro: Alert and oriented. Patient anxious.  Cardiac: normal sinus rhythm.  Respiratory: Room air. Chest tubes x4 removed 9/17. Chest xray showed small increase in left pleural effusion.  GI/: Adequate urine output. Loose stools 9/17 times 4. No stools this shift.  Diet: Low sodium diet. Good appetite. Had an egg and oatmeal for breakfast.  Lines: PIV saline locked.  Skin/Wounds: Chest tube site has small amount of drainage.  Plan: Planning to discharge 9/19 to home.      Continue to monitor and follow POC    Patrizia Reynolds RN on 9/18/2022 at 5:37 PM                     "

## 2022-09-19 ENCOUNTER — APPOINTMENT (OUTPATIENT)
Dept: OCCUPATIONAL THERAPY | Facility: CLINIC | Age: 72
DRG: 236 | End: 2022-09-19
Attending: SURGERY
Payer: COMMERCIAL

## 2022-09-19 VITALS
HEIGHT: 66 IN | RESPIRATION RATE: 18 BRPM | BODY MASS INDEX: 30.05 KG/M2 | HEART RATE: 75 BPM | WEIGHT: 186.95 LBS | SYSTOLIC BLOOD PRESSURE: 115 MMHG | OXYGEN SATURATION: 95 % | TEMPERATURE: 97.5 F | DIASTOLIC BLOOD PRESSURE: 70 MMHG

## 2022-09-19 LAB
ANION GAP SERPL CALCULATED.3IONS-SCNC: 5 MMOL/L (ref 7–15)
BUN SERPL-MCNC: 14.5 MG/DL (ref 8–23)
CALCIUM SERPL-MCNC: 8.3 MG/DL (ref 8.8–10.2)
CHLORIDE SERPL-SCNC: 99 MMOL/L (ref 98–107)
CREAT SERPL-MCNC: 0.69 MG/DL (ref 0.51–0.95)
DEPRECATED HCO3 PLAS-SCNC: 29 MMOL/L (ref 22–29)
ERYTHROCYTE [DISTWIDTH] IN BLOOD BY AUTOMATED COUNT: 14.6 % (ref 10–15)
GFR SERPL CREATININE-BSD FRML MDRD: >90 ML/MIN/1.73M2
GLUCOSE SERPL-MCNC: 110 MG/DL (ref 70–99)
HBV SURFACE AG SERPL QL IA: NONREACTIVE
HCT VFR BLD AUTO: 24.1 % (ref 35–47)
HGB BLD-MCNC: 7.8 G/DL (ref 11.7–15.7)
HIV 1+2 AB+HIV1 P24 AG SERPL QL IA: NONREACTIVE
MAGNESIUM SERPL-MCNC: 2 MG/DL (ref 1.7–2.3)
MCH RBC QN AUTO: 29.8 PG (ref 26.5–33)
MCHC RBC AUTO-ENTMCNC: 32.4 G/DL (ref 31.5–36.5)
MCV RBC AUTO: 92 FL (ref 78–100)
PHOSPHATE SERPL-MCNC: 3.7 MG/DL (ref 2.5–4.5)
PLATELET # BLD AUTO: 263 10E3/UL (ref 150–450)
POTASSIUM SERPL-SCNC: 4.4 MMOL/L (ref 3.4–5.3)
RBC # BLD AUTO: 2.62 10E6/UL (ref 3.8–5.2)
SODIUM SERPL-SCNC: 133 MMOL/L (ref 136–145)
WBC # BLD AUTO: 8.2 10E3/UL (ref 4–11)

## 2022-09-19 PROCEDURE — 250N000013 HC RX MED GY IP 250 OP 250 PS 637: Performed by: PHYSICIAN ASSISTANT

## 2022-09-19 PROCEDURE — 84145 PROCALCITONIN (PCT): CPT | Performed by: STUDENT IN AN ORGANIZED HEALTH CARE EDUCATION/TRAINING PROGRAM

## 2022-09-19 PROCEDURE — 250N000011 HC RX IP 250 OP 636: Performed by: SURGERY

## 2022-09-19 PROCEDURE — 250N000013 HC RX MED GY IP 250 OP 250 PS 637: Performed by: STUDENT IN AN ORGANIZED HEALTH CARE EDUCATION/TRAINING PROGRAM

## 2022-09-19 PROCEDURE — 250N000011 HC RX IP 250 OP 636: Performed by: NURSE PRACTITIONER

## 2022-09-19 PROCEDURE — 97530 THERAPEUTIC ACTIVITIES: CPT | Mod: GO

## 2022-09-19 PROCEDURE — 36415 COLL VENOUS BLD VENIPUNCTURE: CPT | Performed by: PHYSICIAN ASSISTANT

## 2022-09-19 PROCEDURE — 84100 ASSAY OF PHOSPHORUS: CPT | Performed by: THORACIC SURGERY (CARDIOTHORACIC VASCULAR SURGERY)

## 2022-09-19 PROCEDURE — 83735 ASSAY OF MAGNESIUM: CPT | Performed by: NURSE PRACTITIONER

## 2022-09-19 PROCEDURE — 85027 COMPLETE CBC AUTOMATED: CPT | Performed by: PHYSICIAN ASSISTANT

## 2022-09-19 PROCEDURE — 250N000013 HC RX MED GY IP 250 OP 250 PS 637: Performed by: SURGERY

## 2022-09-19 PROCEDURE — 250N000013 HC RX MED GY IP 250 OP 250 PS 637: Performed by: NURSE PRACTITIONER

## 2022-09-19 PROCEDURE — 80048 BASIC METABOLIC PNL TOTAL CA: CPT | Performed by: NURSE PRACTITIONER

## 2022-09-19 RX ORDER — PANTOPRAZOLE SODIUM 40 MG/1
40 TABLET, DELAYED RELEASE ORAL DAILY
Qty: 7 TABLET | Refills: 0 | Status: SHIPPED | OUTPATIENT
Start: 2022-09-20 | End: 2022-09-27

## 2022-09-19 RX ORDER — POLYETHYLENE GLYCOL 3350 17 G/17G
17 POWDER, FOR SOLUTION ORAL DAILY PRN
Qty: 510 G | Refills: 0 | Status: SHIPPED | OUTPATIENT
Start: 2022-09-19 | End: 2022-10-05

## 2022-09-19 RX ORDER — OXYCODONE HYDROCHLORIDE 5 MG/1
5-10 TABLET ORAL EVERY 4 HOURS PRN
Qty: 25 TABLET | Refills: 0 | Status: ON HOLD | OUTPATIENT
Start: 2022-09-19 | End: 2022-09-26

## 2022-09-19 RX ORDER — ZOLPIDEM TARTRATE 5 MG/1
2.5 TABLET ORAL
Qty: 10 TABLET | Refills: 0 | Status: SHIPPED | OUTPATIENT
Start: 2022-09-19

## 2022-09-19 RX ORDER — METHOCARBAMOL 500 MG/1
1000 TABLET, FILM COATED ORAL EVERY 6 HOURS PRN
Qty: 100 TABLET | Refills: 0 | Status: SHIPPED | OUTPATIENT
Start: 2022-09-19

## 2022-09-19 RX ORDER — HYDROCHLOROTHIAZIDE 50 MG/1
25 TABLET ORAL EVERY MORNING
Qty: 30 TABLET | Refills: 3 | Status: SHIPPED | OUTPATIENT
Start: 2022-09-26 | End: 2023-04-14 | Stop reason: ALTCHOICE

## 2022-09-19 RX ORDER — AMOXICILLIN 250 MG
1 CAPSULE ORAL 2 TIMES DAILY PRN
Qty: 60 TABLET | Refills: 0 | Status: SHIPPED | OUTPATIENT
Start: 2022-09-19 | End: 2023-01-06

## 2022-09-19 RX ORDER — SULFAMETHOXAZOLE AND TRIMETHOPRIM 400; 80 MG/1; MG/1
1 TABLET ORAL 2 TIMES DAILY
Status: DISCONTINUED | OUTPATIENT
Start: 2022-09-19 | End: 2022-09-19 | Stop reason: HOSPADM

## 2022-09-19 RX ORDER — FUROSEMIDE 40 MG
40 TABLET ORAL DAILY
Qty: 7 TABLET | Refills: 0 | Status: SHIPPED | OUTPATIENT
Start: 2022-09-19 | End: 2022-10-05

## 2022-09-19 RX ORDER — SULFAMETHOXAZOLE AND TRIMETHOPRIM 400; 80 MG/1; MG/1
1 TABLET ORAL 2 TIMES DAILY
Qty: 14 TABLET | Refills: 0 | Status: SHIPPED | OUTPATIENT
Start: 2022-09-19 | End: 2022-09-21

## 2022-09-19 RX ORDER — MAGNESIUM OXIDE 400 MG/1
400 TABLET ORAL EVERY 4 HOURS
Status: DISCONTINUED | OUTPATIENT
Start: 2022-09-19 | End: 2022-09-19 | Stop reason: HOSPADM

## 2022-09-19 RX ORDER — ASPIRIN 81 MG/1
162 TABLET, CHEWABLE ORAL DAILY
Qty: 60 TABLET | Refills: 3 | Status: SHIPPED | OUTPATIENT
Start: 2022-09-20 | End: 2022-09-22

## 2022-09-19 RX ADMIN — MAGNESIUM OXIDE TAB 400 MG (241.3 MG ELEMENTAL MG) 400 MG: 400 (241.3 MG) TAB at 12:16

## 2022-09-19 RX ADMIN — PANTOPRAZOLE SODIUM 40 MG: 40 TABLET, DELAYED RELEASE ORAL at 08:44

## 2022-09-19 RX ADMIN — PAROXETINE 20 MG: 20 TABLET, FILM COATED ORAL at 08:43

## 2022-09-19 RX ADMIN — SENNOSIDES AND DOCUSATE SODIUM 1 TABLET: 50; 8.6 TABLET ORAL at 08:44

## 2022-09-19 RX ADMIN — METHOCARBAMOL 1000 MG: 500 TABLET, FILM COATED ORAL at 12:17

## 2022-09-19 RX ADMIN — HEPARIN SODIUM 5000 UNITS: 5000 INJECTION, SOLUTION INTRAVENOUS; SUBCUTANEOUS at 05:28

## 2022-09-19 RX ADMIN — OXYCODONE HYDROCHLORIDE 5 MG: 5 TABLET ORAL at 05:27

## 2022-09-19 RX ADMIN — CYCLOBENZAPRINE 10 MG: 10 TABLET, FILM COATED ORAL at 08:44

## 2022-09-19 RX ADMIN — ALBUTEROL SULFATE 2 PUFF: 90 AEROSOL, METERED RESPIRATORY (INHALATION) at 05:27

## 2022-09-19 RX ADMIN — METHOCARBAMOL 1000 MG: 500 TABLET, FILM COATED ORAL at 05:28

## 2022-09-19 RX ADMIN — ASPIRIN 81 MG CHEWABLE TABLET 162 MG: 81 TABLET CHEWABLE at 08:43

## 2022-09-19 RX ADMIN — ALBUTEROL SULFATE 2 PUFF: 90 AEROSOL, METERED RESPIRATORY (INHALATION) at 09:20

## 2022-09-19 RX ADMIN — SULFAMETHOXAZOLE AND TRIMETHOPRIM 1 TABLET: 400; 80 TABLET ORAL at 12:15

## 2022-09-19 RX ADMIN — FUROSEMIDE 20 MG: 10 INJECTION, SOLUTION INTRAMUSCULAR; INTRAVENOUS at 08:44

## 2022-09-19 RX ADMIN — HEPARIN SODIUM 5000 UNITS: 5000 INJECTION, SOLUTION INTRAVENOUS; SUBCUTANEOUS at 12:21

## 2022-09-19 RX ADMIN — METOPROLOL SUCCINATE 25 MG: 25 TABLET, EXTENDED RELEASE ORAL at 08:43

## 2022-09-19 RX ADMIN — OXYCODONE HYDROCHLORIDE 10 MG: 5 TABLET ORAL at 10:22

## 2022-09-19 ASSESSMENT — ACTIVITIES OF DAILY LIVING (ADL)
ADLS_ACUITY_SCORE: 24

## 2022-09-19 NOTE — PLAN OF CARE
Goal Outcome Evaluation:    Plan of Care Reviewed With: patient     Pt A/Ox4 pleasant and cooperative.  Pt was already in shower and getting self ready this morning during change of shift.  Pt dressed own chest tube sites, reported there was a scant amt of bleeding from one chest tube site, covered with ABD.  There was a pin point drop of white drainage from top of sternal incision, Dr. Lawson tobar, Jessica Astudillo NP came back by and lanced incision, see report and redressed.  Pt worked with therapy, tolerated going up and down stairs per report.  All discharge instructions reviewed with pt, pt verbalized understanding, pt taken down to front door via wheelchair by this provider, stopped at pharmacy to  meds.  Pt pack up own belongings, including purse, clothes, cellphone, ipad, wallet and shoes.

## 2022-09-19 NOTE — PROGRESS NOTES
Neuro: A&Ox4.   Cardiac: SR. VSS.       Respiratory: Sating >93 on RA.  GI/: Adequate urine output. No BM this shift.  Diet/appetite: Tolerating low fat low sodium diet. Eating well.  Activity:  up ad carlitos.  Pain: Controlled with PRN oxy 5mg given x2.  Skin: No new deficits noted.   LDA's: PIV SL.      Plan: Continue with POC. Possible discharge 09/19. Notify primary team with changes

## 2022-09-19 NOTE — DISCHARGE SUMMARY
[facility], [place]   Cardiothoracic Surgery Hospital Discharge Summary     Luis Alberto Garcia MRN# [id number]   Age: 72 year old YOB: 1950     Admitting Physician:  Galo Pathak MD  Discharge Physician:  Jessica Astudillo NP  Primary Care Physician:        Shiv Gomez     DATE OF ADMISSION: 9/14/2022      DATE OF DISCHARGE: September 19, 2022     Admit Wt: 180 lbs  Discharge Wt: 186 lbs          Primary Diagnoses:     Severe multivessel coronary artery disease s/p CABG x5    Postoperative antimicrobial prophylaxis           Secondary Diagnoses:     Mild to moderate aortic regurgitation    HTN    Emphysema    Acute postoperative pain, improving    Hypervolemia, improving    GERD    Fatty liver disease    Stress induced hyperglycemia, resolved    Stress induced leukocytosis, resolved    Acute blood loss anemia, stable    PROCEDURES PERFORMED:   Date: 9/14/22.  Surgeon: Dr. Galo Pathak  1.  Coronary artery bypass grafting x 5 (left internal mammary artery to first diagonal artery, saphenous vein graft to left anterior descending artery, saphenous vein graft to first obtuse marginal artery, saphenous vein graft to second obtuse marginal artery, saphenous vein graft to distal right coronary artery).  2.  Endoscopic vein harvest.    INTRAOPERATIVE FINDINGS:    The patient's sternum was of adequate quality.  Pericardial space was free of any adhesions.  Veins obtained were adequate for bypass grafting.  Left internal mammary artery was of adequate quality, good flow.  Aorta was grossly free of plaques.  Vessels bypassed included the distal left anterior descending artery, which was a relatively smaller vessel, approximately 1.75 mm diameter, the first diagonal artery, which is a large 2.5 mm vessel with proximal stenosis, as well as a first and second obtuse marginal artery, both 2 mm in diameter, proximal stenosis, and the distal right coronary artery, which had proximal  stenosis.    PATHOLOGY RESULTS:    none     CULTURE RESULTS:    none    CONSULTS:      PT/OT    Intensivist    Anesthesia     BRIEF HISTORY OF ILLNESS:  Luis Alberto Garcia is a 72-year-old female with severe triple-vessel coronary artery disease. Other history includes HTN, smoking, emphysema, GERD, fatty liver disease, migraines, anxiety, insomnia, aortic regurgitation.     HOSPITAL COURSE: Luis Alberto Garcia is a 72 year old female who on 9/14/2022 underwent the above-named procedures and tolerated the operation well.     Postoperatively was admitted to the CVICU.  Patient was extubated within protocol on POD #0.  Blood pressure and cardiac index were managed with vasopressors and inotropic agents which were continuously weaned until no longer needed.  Patient was subsequently  transferred to the surgical telemetry floor.    While on the surgical unit, the patient continued to progress well. Chest tubes and temporary pacemaker wires were removed when deemed appropriate.     Patient was fluid overloaded and treated with diuretics. She remains up about 6 lbs from preoperative weight and will discharge with 7 days of diuretic therapy in the form of lasix and will then transition back to her PTA HCTZ; will re-evaluate need in clinic follow-up. She has a small left pleural effusion vs atelectasis on CXR - she has been working with her IS and increasing activity as well. Having some shortness of breath with activity but saturating well on room air and no dyspnea at rest or PND, orthopnea.     Patient was transiently hyperglycemic and treated with insulin infusion then transitioned to sliding scale insulin per protocol. Blood sugars remained stable. No further glycemic control agents needed at this time.    Luis Alberto has a small area at the superior aspect of her incision that was lanced with a sterile blade at the direction of the surgeon to ensure no infection present. Incision has no evidence of erythema, purulence or other  "signs/symptoms of infection. Pt was educated on how to cleanse area and will discharge on a 7 day course of Bactrim given history of MRSA. She was also educated on when to call with concerns.     Prior to discharge, her pain was controlled well, she was working well with therapies, able to perform most ADLs, ambulate without difficulty, and had full return of bowel and bladder function.  On September 19, 2022, she was discharged to home with the help of family in stable condition. Follow up with cardiology and cardiac surgery have been arranged. Pt encouraged to follow up with PCP and cardiac rehab upon discharge.    Patient discharged on aspirin:  Yes 162 mg  Patient discharged on beta blocker: yes    Patient discharged on ACE Inhibitor/ARB: no      Patient discharged on statin: yes          Discharge Disposition:     Discharged to home            Condition on Discharge:     Discharge condition: Good   Discharge vitals: Blood pressure 115/70, pulse 75, temperature 97.5  F (36.4  C), temperature source Axillary, resp. rate 18, height 1.676 m (5' 6\"), weight 84.8 kg (186 lb 15.2 oz), SpO2 95 %, not currently breastfeeding.     Code status on discharge: Full Code     Vitals:    09/16/22 0620 09/18/22 0455 09/19/22 0500   Weight: 88.3 kg (194 lb 10.7 oz) 86.4 kg (190 lb 7.6 oz) 84.8 kg (186 lb 15.2 oz)       DAY OF DISCHARGE PHYSICAL EXAM:    Gen: A&Ox4, NAD  Neuro: Intact with no focal deficits   CV: RRR, normal S1 S2, no murmurs, rubs or gallops. no JVD  Pulm: CTA, no wheezing or rhonchi, normal breathing on RA  Abd: nondistended, normal BS, soft, nontender  Ext: trace peripheral edema, no pitting  Incision: clean, dry, intact, no erythema, sternum stable  Tubes/drain sites: dressing clean and dry    LABS  Most Recent 3 CBC's:  Recent Labs   Lab Test 09/19/22  0517 09/18/22  0602 09/17/22  0609   WBC 8.2 8.8 10.7   HGB 7.8* 7.9* 8.0*   MCV 92 91 93    239 194      Most Recent 3 BMP's:  Recent Labs   Lab Test " 22  0517 22  0602 22  0609   * 136 136   POTASSIUM 4.4 4.2 4.4  4.4   CHLORIDE 99 103 103   CO2 29 25 26   BUN 14.5 14.9 20.5   CR 0.69 0.61 0.64   ANIONGAP 5* 8 7   ALYCE 8.3* 8.3* 8.1*   * 106* 106*     Most Recent 2 LFT's:  Recent Labs   Lab Test 22  0609 22  0455   AST 56* 72*   ALT 21 19   ALKPHOS 51 51   BILITOTAL 0.3 0.3     Most Recent INR's and Anticoagulation Dosing History:  Anticoagulation Dose History     Recent Dosing and Labs Latest Ref Rng & Units 2022    INR 0.85 - 1.15 1.08 1.48(H) 1.41(H) 1.30(H)        Most Recent 3 Troponin's:No lab results found.  Most Recent Cholesterol Panel:No lab results found.  Most Recent 6 Bacteria Isolates From Any Culture (See EPIC Reports for Culture Details):No lab results found.  Most Recent TSH, T4 and A1c Labs:No lab results found.   Recent Labs   Lab 22  0517 22  0602 22  0609 22  2340 22  1616 22  1142   * 106* 106* 123* 99 107*       Imagin22 CXR:  Findings:   Frontal and lateral views of the chest. Slightly increased left  pleural effusion with unchanged left basilar opacities likely  representing atelectasis or consolidation. No appreciable  pneumothorax.  The cardiac silhouette is within normal limits. No acute airspace  opacities.                                                                Impression:   1.  Slightly increased left pleural effusion with unchanged left  basilar opacities.     PRE-ADMISSION MEDICATIONS:  Medications Prior to Admission   Medication Sig Dispense Refill Last Dose     acyclovir (ZOVIRAX) 400 MG tablet Take 400 mg by mouth daily as needed   2022 at 0900     albuterol (PROAIR HFA/PROVENTIL HFA/VENTOLIN HFA) 108 (90 Base) MCG/ACT inhaler Inhale 2 puffs into the lungs every 6 hours   Past Month at Unknown time     Ergocalciferol 50 MCG (2000 UT) TABS 50 mcg every morning   2022 at Unknown time      fish oil-omega-3 fatty acids 1000 MG capsule Take 2 g by mouth every morning   Past Week at Unknown time     gabapentin (NEURONTIN) 100 MG capsule 200 mg At Bedtime   9/13/2022 at Unknown time     magnesium 100 MG CAPS 200 mg At Bedtime   9/13/2022 at Unknown time     metoprolol succinate ER (TOPROL XL) 25 MG 24 hr tablet Take 25 mg by mouth every morning   9/13/2022 at Unknown time     mupirocin (BACTROBAN) 2 % external ointment as needed   9/13/2022 at Unknown time     nitroGLYcerin (NITROSTAT) 0.4 MG sublingual tablet 0.4 mg every 5 minutes as needed   9/13/2022 at Unknown time     omeprazole (PRILOSEC) 20 MG DR capsule 20 mg At Bedtime   9/13/2022 at Unknown time     PARoxetine (PAXIL) 20 MG tablet Take 20 mg by mouth every morning   9/13/2022 at Unknown time     pravastatin (PRAVACHOL) 10 MG tablet Take 10 mg by mouth every morning        sodium fluoride dental gel (PREVIDENT) 1.1 % GEL topical gel Brush 2x/day.  Do not eat or drink for 30 minutes after.        [DISCONTINUED] amLODIPine (NORVASC) 5 MG tablet Take 5 mg by mouth every morning   9/13/2022 at Unknown time     [DISCONTINUED] aspirin (ASA) 81 MG chewable tablet Take 81 mg by mouth every morning   Past Week at 0800     [DISCONTINUED] celecoxib (CELEBREX) 200 MG capsule Take 200 mg by mouth every morning   Past Week at 0800     [DISCONTINUED] hydrochlorothiazide (HYDRODIURIL) 50 MG tablet Take 50 mg by mouth every morning   9/13/2022 at Unknown time     [DISCONTINUED] lisinopril (ZESTRIL) 40 MG tablet Take 40 mg by mouth every morning   9/13/2022 at 0900     [DISCONTINUED] nystatin (MYCOSTATIN) 933579 UNIT/ML suspension SWISH AND SWALLOW 5 ML BY MOUTH 4 TIMES A DAY. (Patient not taking: Reported on 9/8/2022)        [DISCONTINUED] spironolactone (ALDACTONE) 50 MG tablet 100 mg every morning   9/13/2022 at Unknown time     [DISCONTINUED] zolpidem (AMBIEN) 10 MG tablet 10 mg nightly as needed   9/13/2022 at Unknown time       DISCHARGE MEDICATIONS:    Current Discharge Medication List      START taking these medications    Details   furosemide (LASIX) 40 MG tablet Take 1 tablet (40 mg) by mouth daily for 7 days  Qty: 7 tablet, Refills: 0    Associated Diagnoses: S/P CABG x 5      methocarbamol (ROBAXIN) 500 MG tablet Take 2 tablets (1,000 mg) by mouth every 6 hours as needed for muscle spasms or other (pain)  Qty: 100 tablet, Refills: 0    Associated Diagnoses: S/P CABG x 5      oxyCODONE (ROXICODONE) 5 MG tablet Take 1-2 tablets (5-10 mg) by mouth every 4 hours as needed for moderate to severe pain  Qty: 25 tablet, Refills: 0    Associated Diagnoses: S/P CABG x 5      pantoprazole (PROTONIX) 40 MG EC tablet Take 1 tablet (40 mg) by mouth daily for 7 days  Qty: 7 tablet, Refills: 0    Associated Diagnoses: S/P CABG x 5      polyethylene glycol (MIRALAX) 17 GM/Dose powder Take 17 g by mouth daily as needed for constipation  Qty: 510 g, Refills: 0    Associated Diagnoses: S/P CABG x 5      senna-docusate (SENOKOT-S/PERICOLACE) 8.6-50 MG tablet 1 tablet by Oral or Feeding Tube route 2 times daily as needed for constipation  Qty: 60 tablet, Refills: 0    Associated Diagnoses: S/P CABG x 5      sulfamethoxazole-trimethoprim (BACTRIM) 400-80 MG tablet Take 1 tablet by mouth 2 times daily for 7 days  Qty: 14 tablet, Refills: 0    Associated Diagnoses: S/P CABG x 5         CONTINUE these medications which have CHANGED    Details   aspirin (ASA) 81 MG chewable tablet Take 2 tablets (162 mg) by mouth daily  Qty: 60 tablet, Refills: 3    Associated Diagnoses: S/P CABG x 5      hydrochlorothiazide (HYDRODIURIL) 50 MG tablet Take 0.5 tablets (25 mg) by mouth every morning  Qty: 30 tablet, Refills: 3    Associated Diagnoses: S/P CABG x 5      zolpidem (AMBIEN) 5 MG tablet Take 0.5 tablets (2.5 mg) by mouth nightly as needed for sleep  Qty: 10 tablet, Refills: 0    Associated Diagnoses: S/P CABG x 5         CONTINUE these medications which have NOT CHANGED    Details    acyclovir (ZOVIRAX) 400 MG tablet Take 400 mg by mouth daily as needed      albuterol (PROAIR HFA/PROVENTIL HFA/VENTOLIN HFA) 108 (90 Base) MCG/ACT inhaler Inhale 2 puffs into the lungs every 6 hours      Ergocalciferol 50 MCG (2000 UT) TABS 50 mcg every morning      fish oil-omega-3 fatty acids 1000 MG capsule Take 2 g by mouth every morning      gabapentin (NEURONTIN) 100 MG capsule 200 mg At Bedtime      magnesium 100 MG CAPS 200 mg At Bedtime      metoprolol succinate ER (TOPROL XL) 25 MG 24 hr tablet Take 25 mg by mouth every morning      mupirocin (BACTROBAN) 2 % external ointment as needed      nitroGLYcerin (NITROSTAT) 0.4 MG sublingual tablet 0.4 mg every 5 minutes as needed      omeprazole (PRILOSEC) 20 MG DR capsule 20 mg At Bedtime      PARoxetine (PAXIL) 20 MG tablet Take 20 mg by mouth every morning      pravastatin (PRAVACHOL) 10 MG tablet Take 10 mg by mouth every morning      sodium fluoride dental gel (PREVIDENT) 1.1 % GEL topical gel Brush 2x/day.  Do not eat or drink for 30 minutes after.         STOP taking these medications       amLODIPine (NORVASC) 5 MG tablet Comments:   Reason for Stopping:         celecoxib (CELEBREX) 200 MG capsule Comments:   Reason for Stopping:         lisinopril (ZESTRIL) 40 MG tablet Comments:   Reason for Stopping:         nystatin (MYCOSTATIN) 773246 UNIT/ML suspension Comments:   Reason for Stopping:         spironolactone (ALDACTONE) 50 MG tablet Comments:   Reason for Stopping:                CC:lalo Gomez [facility]   Cardiothoracic Surgery  Office phone: [phone]  Office fax: [phone]

## 2022-09-20 ENCOUNTER — TELEPHONE (OUTPATIENT)
Dept: CARDIOLOGY | Facility: CLINIC | Age: 72
End: 2022-09-20

## 2022-09-20 ENCOUNTER — HOSPITAL ENCOUNTER (INPATIENT)
Facility: CLINIC | Age: 72
LOS: 1 days | Discharge: HOME | DRG: 309 | End: 2022-09-21
Attending: EMERGENCY MEDICINE | Admitting: INTERNAL MEDICINE
Payer: COMMERCIAL

## 2022-09-20 ENCOUNTER — OFFICE VISIT (OUTPATIENT)
Dept: CARDIOLOGY | Facility: CLINIC | Age: 72
DRG: 309 | End: 2022-09-20
Attending: SURGERY
Payer: COMMERCIAL

## 2022-09-20 ENCOUNTER — APPOINTMENT (OUTPATIENT)
Dept: GENERAL RADIOLOGY | Facility: CLINIC | Age: 72
DRG: 309 | End: 2022-09-20
Attending: EMERGENCY MEDICINE
Payer: COMMERCIAL

## 2022-09-20 VITALS — OXYGEN SATURATION: 94 % | DIASTOLIC BLOOD PRESSURE: 62 MMHG | SYSTOLIC BLOOD PRESSURE: 105 MMHG | HEART RATE: 82 BPM

## 2022-09-20 DIAGNOSIS — Z95.1 S/P CABG (CORONARY ARTERY BYPASS GRAFT): ICD-10-CM

## 2022-09-20 DIAGNOSIS — Z95.1 S/P CABG (CORONARY ARTERY BYPASS GRAFT): Primary | ICD-10-CM

## 2022-09-20 DIAGNOSIS — I49.9 IRREGULAR HEART BEAT: Primary | ICD-10-CM

## 2022-09-20 DIAGNOSIS — Z95.1 S/P CABG X 5: ICD-10-CM

## 2022-09-20 DIAGNOSIS — I48.91 ATRIAL FIBRILLATION, UNSPECIFIED TYPE (H): ICD-10-CM

## 2022-09-20 DIAGNOSIS — Z20.822 LAB TEST NEGATIVE FOR COVID-19 VIRUS: ICD-10-CM

## 2022-09-20 LAB
ALBUMIN SERPL BCG-MCNC: 3.6 G/DL (ref 3.5–5.2)
ALP SERPL-CCNC: 67 U/L (ref 35–104)
ALT SERPL W P-5'-P-CCNC: 41 U/L (ref 10–35)
ANION GAP SERPL CALCULATED.3IONS-SCNC: 15 MMOL/L (ref 7–15)
AST SERPL W P-5'-P-CCNC: 38 U/L (ref 10–35)
ATRIAL RATE - MUSE: 108 BPM
ATRIAL RATE - MUSE: 78 BPM
BASOPHILS # BLD AUTO: 0.1 10E3/UL (ref 0–0.2)
BASOPHILS NFR BLD AUTO: 1 %
BILIRUB SERPL-MCNC: 0.3 MG/DL
BUN SERPL-MCNC: 14 MG/DL (ref 8–23)
CALCIUM SERPL-MCNC: 8.9 MG/DL (ref 8.8–10.2)
CHLORIDE SERPL-SCNC: 97 MMOL/L (ref 98–107)
CREAT SERPL-MCNC: 0.8 MG/DL (ref 0.51–0.95)
DEPRECATED HCO3 PLAS-SCNC: 23 MMOL/L (ref 22–29)
DIASTOLIC BLOOD PRESSURE - MUSE: NORMAL MMHG
DIASTOLIC BLOOD PRESSURE - MUSE: NORMAL MMHG
EOSINOPHIL # BLD AUTO: 0.2 10E3/UL (ref 0–0.7)
EOSINOPHIL NFR BLD AUTO: 3 %
ERYTHROCYTE [DISTWIDTH] IN BLOOD BY AUTOMATED COUNT: 14.8 % (ref 10–15)
ERYTHROCYTE [DISTWIDTH] IN BLOOD BY AUTOMATED COUNT: 15 % (ref 10–15)
GFR SERPL CREATININE-BSD FRML MDRD: 78 ML/MIN/1.73M2
GLUCOSE SERPL-MCNC: 94 MG/DL (ref 70–99)
HCT VFR BLD AUTO: 27.3 % (ref 35–47)
HCT VFR BLD AUTO: 27.9 % (ref 35–47)
HGB BLD-MCNC: 8.8 G/DL (ref 11.7–15.7)
HGB BLD-MCNC: 9.1 G/DL (ref 11.7–15.7)
HOLD SPECIMEN: NORMAL
HOLD SPECIMEN: NORMAL
IMM GRANULOCYTES # BLD: 0.3 10E3/UL
IMM GRANULOCYTES NFR BLD: 3 %
INR PPP: 1.04 (ref 0.85–1.15)
INTERPRETATION ECG - MUSE: NORMAL
INTERPRETATION ECG - MUSE: NORMAL
LACTATE SERPL-SCNC: 1.7 MMOL/L (ref 0.7–2)
LYMPHOCYTES # BLD AUTO: 1.4 10E3/UL (ref 0.8–5.3)
LYMPHOCYTES NFR BLD AUTO: 16 %
MAGNESIUM SERPL-MCNC: 2.2 MG/DL (ref 1.7–2.3)
MCH RBC QN AUTO: 29.8 PG (ref 26.5–33)
MCH RBC QN AUTO: 29.9 PG (ref 26.5–33)
MCHC RBC AUTO-ENTMCNC: 32.2 G/DL (ref 31.5–36.5)
MCHC RBC AUTO-ENTMCNC: 32.6 G/DL (ref 31.5–36.5)
MCV RBC AUTO: 92 FL (ref 78–100)
MCV RBC AUTO: 93 FL (ref 78–100)
MONOCYTES # BLD AUTO: 0.9 10E3/UL (ref 0–1.3)
MONOCYTES NFR BLD AUTO: 9 %
NEUTROPHILS # BLD AUTO: 6.3 10E3/UL (ref 1.6–8.3)
NEUTROPHILS NFR BLD AUTO: 68 %
NRBC # BLD AUTO: 0 10E3/UL
NRBC BLD AUTO-RTO: 0 /100
P AXIS - MUSE: 50 DEGREES
P AXIS - MUSE: NORMAL DEGREES
PLATELET # BLD AUTO: 340 10E3/UL (ref 150–450)
PLATELET # BLD AUTO: 350 10E3/UL (ref 150–450)
POTASSIUM SERPL-SCNC: 3.9 MMOL/L (ref 3.4–5.3)
PR INTERVAL - MUSE: 136 MS
PR INTERVAL - MUSE: NORMAL MS
PROT SERPL-MCNC: 6.3 G/DL (ref 6.4–8.3)
QRS DURATION - MUSE: 80 MS
QRS DURATION - MUSE: 84 MS
QT - MUSE: 308 MS
QT - MUSE: 330 MS
QTC - MUSE: 376 MS
QTC - MUSE: 451 MS
R AXIS - MUSE: 41 DEGREES
R AXIS - MUSE: 62 DEGREES
RBC # BLD AUTO: 2.94 10E6/UL (ref 3.8–5.2)
RBC # BLD AUTO: 3.05 10E6/UL (ref 3.8–5.2)
SARS-COV-2 RNA RESP QL NAA+PROBE: NEGATIVE
SODIUM SERPL-SCNC: 135 MMOL/L (ref 136–145)
SYSTOLIC BLOOD PRESSURE - MUSE: NORMAL MMHG
SYSTOLIC BLOOD PRESSURE - MUSE: NORMAL MMHG
T AXIS - MUSE: 210 DEGREES
T AXIS - MUSE: 96 DEGREES
TSH SERPL DL<=0.005 MIU/L-ACNC: 1.89 UIU/ML (ref 0.3–4.2)
VENTRICULAR RATE- MUSE: 129 BPM
VENTRICULAR RATE- MUSE: 78 BPM
WBC # BLD AUTO: 9.1 10E3/UL (ref 4–11)
WBC # BLD AUTO: 9.8 10E3/UL (ref 4–11)

## 2022-09-20 PROCEDURE — 93010 ELECTROCARDIOGRAM REPORT: CPT | Performed by: EMERGENCY MEDICINE

## 2022-09-20 PROCEDURE — 99285 EMERGENCY DEPT VISIT HI MDM: CPT | Mod: 25 | Performed by: EMERGENCY MEDICINE

## 2022-09-20 PROCEDURE — 71045 X-RAY EXAM CHEST 1 VIEW: CPT

## 2022-09-20 PROCEDURE — 250N000013 HC RX MED GY IP 250 OP 250 PS 637

## 2022-09-20 PROCEDURE — 84450 TRANSFERASE (AST) (SGOT): CPT

## 2022-09-20 PROCEDURE — 99024 POSTOP FOLLOW-UP VISIT: CPT | Performed by: NURSE PRACTITIONER

## 2022-09-20 PROCEDURE — 83605 ASSAY OF LACTIC ACID: CPT

## 2022-09-20 PROCEDURE — 36415 COLL VENOUS BLD VENIPUNCTURE: CPT | Performed by: EMERGENCY MEDICINE

## 2022-09-20 PROCEDURE — 36415 COLL VENOUS BLD VENIPUNCTURE: CPT

## 2022-09-20 PROCEDURE — 93005 ELECTROCARDIOGRAM TRACING: CPT | Performed by: EMERGENCY MEDICINE

## 2022-09-20 PROCEDURE — 96376 TX/PRO/DX INJ SAME DRUG ADON: CPT | Performed by: EMERGENCY MEDICINE

## 2022-09-20 PROCEDURE — 71045 X-RAY EXAM CHEST 1 VIEW: CPT | Mod: 26 | Performed by: RADIOLOGY

## 2022-09-20 PROCEDURE — 999N000127 HC STATISTIC PERIPHERAL IV START W US GUIDANCE

## 2022-09-20 PROCEDURE — 84443 ASSAY THYROID STIM HORMONE: CPT | Performed by: EMERGENCY MEDICINE

## 2022-09-20 PROCEDURE — 85610 PROTHROMBIN TIME: CPT

## 2022-09-20 PROCEDURE — 87040 BLOOD CULTURE FOR BACTERIA: CPT

## 2022-09-20 PROCEDURE — 250N000013 HC RX MED GY IP 250 OP 250 PS 637: Performed by: EMERGENCY MEDICINE

## 2022-09-20 PROCEDURE — 85025 COMPLETE CBC W/AUTO DIFF WBC: CPT | Performed by: EMERGENCY MEDICINE

## 2022-09-20 PROCEDURE — 96368 THER/DIAG CONCURRENT INF: CPT | Performed by: EMERGENCY MEDICINE

## 2022-09-20 PROCEDURE — G0463 HOSPITAL OUTPT CLINIC VISIT: HCPCS

## 2022-09-20 PROCEDURE — 258N000003 HC RX IP 258 OP 636: Performed by: EMERGENCY MEDICINE

## 2022-09-20 PROCEDURE — 84145 PROCALCITONIN (PCT): CPT | Performed by: STUDENT IN AN ORGANIZED HEALTH CARE EDUCATION/TRAINING PROGRAM

## 2022-09-20 PROCEDURE — 83735 ASSAY OF MAGNESIUM: CPT | Performed by: EMERGENCY MEDICINE

## 2022-09-20 PROCEDURE — 250N000011 HC RX IP 250 OP 636

## 2022-09-20 PROCEDURE — U0005 INFEC AGEN DETEC AMPLI PROBE: HCPCS | Performed by: EMERGENCY MEDICINE

## 2022-09-20 PROCEDURE — 84155 ASSAY OF PROTEIN SERUM: CPT

## 2022-09-20 PROCEDURE — C9803 HOPD COVID-19 SPEC COLLECT: HCPCS | Performed by: EMERGENCY MEDICINE

## 2022-09-20 PROCEDURE — 96366 THER/PROPH/DIAG IV INF ADDON: CPT | Performed by: EMERGENCY MEDICINE

## 2022-09-20 PROCEDURE — 80053 COMPREHEN METABOLIC PANEL: CPT | Performed by: EMERGENCY MEDICINE

## 2022-09-20 PROCEDURE — 93005 ELECTROCARDIOGRAM TRACING: CPT

## 2022-09-20 PROCEDURE — 85027 COMPLETE CBC AUTOMATED: CPT

## 2022-09-20 PROCEDURE — 96365 THER/PROPH/DIAG IV INF INIT: CPT | Performed by: EMERGENCY MEDICINE

## 2022-09-20 PROCEDURE — 250N000011 HC RX IP 250 OP 636: Performed by: EMERGENCY MEDICINE

## 2022-09-20 PROCEDURE — 120N000002 HC R&B MED SURG/OB UMMC

## 2022-09-20 RX ORDER — ASPIRIN 81 MG/1
324 TABLET, CHEWABLE ORAL ONCE
Status: DISCONTINUED | OUTPATIENT
Start: 2022-09-20 | End: 2022-09-20

## 2022-09-20 RX ORDER — METHOCARBAMOL 500 MG/1
1000 TABLET, FILM COATED ORAL EVERY 6 HOURS PRN
Status: DISCONTINUED | OUTPATIENT
Start: 2022-09-20 | End: 2022-09-21 | Stop reason: HOSPADM

## 2022-09-20 RX ORDER — DOXYCYCLINE 100 MG/1
100 CAPSULE ORAL EVERY 12 HOURS SCHEDULED
Status: DISCONTINUED | OUTPATIENT
Start: 2022-09-20 | End: 2022-09-21

## 2022-09-20 RX ORDER — ACETAMINOPHEN 650 MG/1
650 SUPPOSITORY RECTAL EVERY 4 HOURS PRN
Status: DISCONTINUED | OUTPATIENT
Start: 2022-09-20 | End: 2022-09-21 | Stop reason: HOSPADM

## 2022-09-20 RX ORDER — OXYCODONE HYDROCHLORIDE 5 MG/1
5 TABLET ORAL ONCE
Status: COMPLETED | OUTPATIENT
Start: 2022-09-20 | End: 2022-09-20

## 2022-09-20 RX ORDER — HEPARIN SODIUM 10000 [USP'U]/100ML
0-5000 INJECTION, SOLUTION INTRAVENOUS CONTINUOUS
Status: DISCONTINUED | OUTPATIENT
Start: 2022-09-20 | End: 2022-09-21

## 2022-09-20 RX ORDER — ACETAMINOPHEN 325 MG/1
650 TABLET ORAL EVERY 4 HOURS PRN
Status: DISCONTINUED | OUTPATIENT
Start: 2022-09-20 | End: 2022-09-21 | Stop reason: HOSPADM

## 2022-09-20 RX ORDER — OXYCODONE HYDROCHLORIDE 5 MG/1
5-10 TABLET ORAL EVERY 4 HOURS PRN
Status: DISCONTINUED | OUTPATIENT
Start: 2022-09-20 | End: 2022-09-21 | Stop reason: HOSPADM

## 2022-09-20 RX ORDER — POLYETHYLENE GLYCOL 3350 17 G/17G
17 POWDER, FOR SOLUTION ORAL DAILY PRN
Status: DISCONTINUED | OUTPATIENT
Start: 2022-09-20 | End: 2022-09-21 | Stop reason: HOSPADM

## 2022-09-20 RX ORDER — NITROGLYCERIN 0.4 MG/1
0.4 TABLET SUBLINGUAL EVERY 5 MIN PRN
Status: DISCONTINUED | OUTPATIENT
Start: 2022-09-20 | End: 2022-09-21 | Stop reason: HOSPADM

## 2022-09-20 RX ORDER — LIDOCAINE 40 MG/G
CREAM TOPICAL
Status: DISCONTINUED | OUTPATIENT
Start: 2022-09-20 | End: 2022-09-21 | Stop reason: HOSPADM

## 2022-09-20 RX ORDER — MAGNESIUM HYDROXIDE/ALUMINUM HYDROXICE/SIMETHICONE 120; 1200; 1200 MG/30ML; MG/30ML; MG/30ML
30 SUSPENSION ORAL EVERY 4 HOURS PRN
Status: DISCONTINUED | OUTPATIENT
Start: 2022-09-20 | End: 2022-09-21 | Stop reason: HOSPADM

## 2022-09-20 RX ADMIN — ACETAMINOPHEN 650 MG: 325 TABLET, FILM COATED ORAL at 23:04

## 2022-09-20 RX ADMIN — OXYCODONE HYDROCHLORIDE 5 MG: 5 TABLET ORAL at 19:45

## 2022-09-20 RX ADMIN — CEFEPIME HYDROCHLORIDE 2 G: 2 INJECTION, POWDER, FOR SOLUTION INTRAVENOUS at 23:06

## 2022-09-20 RX ADMIN — AMIODARONE HYDROCHLORIDE 150 MG: 1.5 INJECTION, SOLUTION INTRAVENOUS at 16:26

## 2022-09-20 RX ADMIN — DOXYCYCLINE HYCLATE 100 MG: 100 CAPSULE ORAL at 23:04

## 2022-09-20 RX ADMIN — AMIODARONE HYDROCHLORIDE 1 MG/MIN: 50 INJECTION, SOLUTION INTRAVENOUS at 17:09

## 2022-09-20 ASSESSMENT — ACTIVITIES OF DAILY LIVING (ADL)
ADLS_ACUITY_SCORE: 35

## 2022-09-20 ASSESSMENT — ENCOUNTER SYMPTOMS
LIGHT-HEADEDNESS: 1
NECK PAIN: 0
VOMITING: 0
NAUSEA: 0
DIZZINESS: 1

## 2022-09-20 ASSESSMENT — PAIN SCALES - GENERAL: PAINLEVEL: NO PAIN (0)

## 2022-09-20 NOTE — NURSING NOTE
Chief Complaint   Patient presents with     Follow Up     Return cv surgery       Vitals were taken, medications reconciled, and EKG performed.    Chance Lr  1:57 PM

## 2022-09-20 NOTE — LETTER
9/20/2022      RE: Luis Alberto Garcia  [address] [place] [address]       Dear Colleague,    Thank you for the opportunity to participate in the care of your patient, Luis Alberto Garcia, at the [facility] [place] [facility]. Please see a copy of my visit note below.    CARDIOTHORACIC SURGERY FOLLOW-UP VISIT     Luis Alberto Garcia   1950   [id number]      Reason for visit: Post-Op CABGx5 with Dr. Pathak on 9/14/2022, palpitations    HPI:  Magda Garcia is a 72 year old female with a PMH significant for severe multivessel CAD, mild to moderate aortic regurgitation, HTN who was discharged from the hospital yesterday after CABG x5. He postoperative course was remarkable for volume overload for which she was diuresed. She discharged on POD #5. She returns to clinic today with complaints of heart palpitations, irregular heart rhythm on her apple watch and associated dizziness.    ASSESSMENT/PLAN:  Luis Alberto Garcia is a 72 year old year old female status post CABG who returns to clinic for palpitations and irregular heart rhythm with associated dizziness. This started in the middle of the night and Luis Alberto has been having bouts of shortness of breath and diaphoresis. She also endorses lack of tolerance with activity.    Severe multivessel CAD  S/P CABG x5 on 9/14/22  Postoperative atrial fibrillation, rapid ventricular response  Today in clinic patient sounds to be in and out of an irregular rhythm with HR up to 140 bpm. EKG demonstrates MAY with occasional NSR. Blood pressure is stable at 105-107 systolic.      Given patient's symptoms and intolerance to the atrial fibrillation, I have instructed her to seek care at the Emergency Department at [facility] where an Amiodarone bolus/drip should be initiated for 24h. If she continues to go in and out of afib despite this, we may need to consider anticoagulation.     She should be re-admitted to the CVTS service  for either observation or inpatient status.    MARCOS Stevens, ACN-AG, CCRN  Nurse Practitioner  Cardiothoracic Surgery  Pager: [phone]      PAST MEDICAL HISTORY:  Past Medical History:   Diagnosis Date     Anxiety      Centrilobular emphysema (H)      Childhood asthma      Coronary artery disease involving native coronary artery of native heart      Esophageal reflux      Essential hypertension      Fatty liver disease      Hair loss      Migraine      Mild aortic stenosis      Mixed hyperlipidemia      MRSA infection     Skin abscesses after COVID vaccinations     Osteoarthritis      Primary insomnia        PAST SURGICAL HISTORY:  Past Surgical History:   Procedure Laterality Date     BYPASS GRAFT ARTERY CORONARY N/A 9/14/2022    Procedure: MEDIAN STERNOTOMY, CORONARY ARTERY BYPASS GRAFT (CABG) X  5 USING THE LEFT INTERNAL MAMMARY AND ENDOSCOPICALLY HARVESTED RIGHT AND LEFT SAPHENOUS VEIN, ON CARDIOPULMONARY BYPASS, INTRAOPERATIVE TRANSESOPHAGEAL ECHOCARDIOGRAM BY ANESTHESIA.;  Surgeon: Galo Pathak MD;  Location: [facility] OR     CATARACT IOL, RT/LT       IR LUMBAR EPIDURAL STEROID INJECTION  10/06/2005     IR LUMBAR EPIDURAL STEROID INJECTION  11/09/2005     Left fibular fracture ORIF  2013     REPLACEMENT TOTAL KNEE Left 2020     REPLACEMENT TOTAL KNEE Right 11/2021     ROTATOR CUFF REPAIR RT/LT Right 2017       CURRENT MEDICATIONS:   Current Outpatient Medications   Medication     acyclovir (ZOVIRAX) 400 MG tablet     albuterol (PROAIR HFA/PROVENTIL HFA/VENTOLIN HFA) 108 (90 Base) MCG/ACT inhaler     aspirin (ASA) 81 MG chewable tablet     Ergocalciferol 50 MCG (2000 UT) TABS     fish oil-omega-3 fatty acids 1000 MG capsule     furosemide (LASIX) 40 MG tablet     gabapentin (NEURONTIN) 100 MG capsule     [START ON 9/26/2022] hydrochlorothiazide (HYDRODIURIL) 50 MG tablet     magnesium 100 MG CAPS     methocarbamol (ROBAXIN) 500 MG tablet     metoprolol succinate ER (TOPROL XL) 25 MG 24 hr tablet      mupirocin (BACTROBAN) 2 % external ointment     nitroGLYcerin (NITROSTAT) 0.4 MG sublingual tablet     omeprazole (PRILOSEC) 20 MG DR capsule     oxyCODONE (ROXICODONE) 5 MG tablet     pantoprazole (PROTONIX) 40 MG EC tablet     PARoxetine (PAXIL) 20 MG tablet     polyethylene glycol (MIRALAX) 17 GM/Dose powder     pravastatin (PRAVACHOL) 10 MG tablet     senna-docusate (SENOKOT-S/PERICOLACE) 8.6-50 MG tablet     sodium fluoride dental gel (PREVIDENT) 1.1 % GEL topical gel     sulfamethoxazole-trimethoprim (BACTRIM) 400-80 MG tablet     zolpidem (AMBIEN) 5 MG tablet     No current facility-administered medications for this visit.       ALLERGIES:      Allergies   Allergen Reactions     Atorvastatin      Kiwi      Latex      Other Drug Allergy (See Comments)      X ray dye and shingrex vaccine        ROS:  Review of symptoms otherwise negative unless commented about in HPI.     LABS:  Last Basic Metabolic Panel:  Lab Results   Component Value Date     09/19/2022      Lab Results   Component Value Date    POTASSIUM 4.4 09/19/2022    POTASSIUM 4.1 09/14/2022     Lab Results   Component Value Date    CHLORIDE 99 09/19/2022     Lab Results   Component Value Date    ALYCE 8.3 09/19/2022     Lab Results   Component Value Date    CO2 29 09/19/2022     Lab Results   Component Value Date    BUN 14.5 09/19/2022     Lab Results   Component Value Date    CR 0.69 09/19/2022     Lab Results   Component Value Date     09/19/2022     09/16/2022       Last CBC:   Lab Results   Component Value Date    WBC 8.2 09/19/2022     Lab Results   Component Value Date    RBC 2.62 09/19/2022     Lab Results   Component Value Date    HGB 7.8 09/19/2022     Lab Results   Component Value Date    HCT 24.1 09/19/2022     No components found for: MCT  Lab Results   Component Value Date    MCV 92 09/19/2022     Lab Results   Component Value Date    MCH 29.8 09/19/2022     Lab Results   Component Value Date    MCHC 32.4 09/19/2022      Lab Results   Component Value Date    RDW 14.6 09/19/2022     Lab Results   Component Value Date     09/19/2022       INR:  Lab Results   Component Value Date    INR 1.30 09/14/2022    INR 1.41 09/14/2022    INR 1.48 09/14/2022    INR 1.08 09/08/2022         IMAGING: EKG    PHYSICAL EXAM:   There were no vitals taken for this visit.  General: alert and oriented x 3, pleasant, no acute distress, normal mood and affect  Neuro: Intact with no focal deficits   CV: Irregularly irregular, no murmurs, rubs or gallops, regular rate and rhythm, no peripheral edema  Pulm: bilateral breath sounds, clear to auscultation, easy work of breathing  Incision: incisions clean dry and intact without erythema, swelling or drainage    PROCEDURES: None         RACHEL MONTELONGO         Please do not hesitate to contact me if you have any questions/concerns.     Sincerely,     Cardiovascular Thoracic Surgery

## 2022-09-20 NOTE — ED TRIAGE NOTES
Pt arrives to triage from home for irregular heart beat. Pt was seen at clinic today where they sent her to the ER. Pt had recent open heart surgery last Wednesday and was just discharged yesterday. Pt reports chest pain in neck, shortness of breath and lightheaded.

## 2022-09-20 NOTE — PROGRESS NOTES
CARDIOTHORACIC SURGERY FOLLOW-UP VISIT     Luis Alberto Garcia   1950   [id number]      Reason for visit: Post-Op CABGx5 with Dr. Pathak on 9/14/2022, palpitations    HPI:  Magda Garcia is a 72 year old female with a PMH significant for severe multivessel CAD, mild to moderate aortic regurgitation, HTN who was discharged from the hospital yesterday after CABG x5. He postoperative course was remarkable for volume overload for which she was diuresed. She discharged on POD #5. She returns to clinic today with complaints of heart palpitations, irregular heart rhythm on her apple watch and associated dizziness.    ASSESSMENT/PLAN:  Luis Alberto Garcia is a 72 year old year old female status post CABG who returns to clinic for palpitations and irregular heart rhythm with associated dizziness. This started in the middle of the night and Luis Alberto has been having bouts of shortness of breath and diaphoresis. She also endorses lack of tolerance with activity.    Severe multivessel CAD  S/P CABG x5 on 9/14/22  Postoperative atrial fibrillation, rapid ventricular response  Today in clinic patient sounds to be in and out of an irregular rhythm with HR up to 140 bpm. EKG demonstrates MAY with occasional NSR. Blood pressure is stable at 105-107 systolic.      Given patient's symptoms and intolerance to the atrial fibrillation, I have instructed her to seek care at the Emergency Department at [facility] where an Amiodarone bolus/drip should be initiated for 24h. If she continues to go in and out of afib despite this, we may need to consider anticoagulation.     She should be re-admitted to the CVTS service for either observation or inpatient status.    MARCOS Stevens, ACN-AG, CCRN  Nurse Practitioner  Cardiothoracic Surgery  Pager: [phone]      PAST MEDICAL HISTORY:  Past Medical History:   Diagnosis Date     Anxiety      Centrilobular emphysema (H)      Childhood asthma      Coronary artery disease involving native coronary artery  of native heart      Esophageal reflux      Essential hypertension      Fatty liver disease      Hair loss      Migraine      Mild aortic stenosis      Mixed hyperlipidemia      MRSA infection     Skin abscesses after COVID vaccinations     Osteoarthritis      Primary insomnia        PAST SURGICAL HISTORY:  Past Surgical History:   Procedure Laterality Date     BYPASS GRAFT ARTERY CORONARY N/A 9/14/2022    Procedure: MEDIAN STERNOTOMY, CORONARY ARTERY BYPASS GRAFT (CABG) X  5 USING THE LEFT INTERNAL MAMMARY AND ENDOSCOPICALLY HARVESTED RIGHT AND LEFT SAPHENOUS VEIN, ON CARDIOPULMONARY BYPASS, INTRAOPERATIVE TRANSESOPHAGEAL ECHOCARDIOGRAM BY ANESTHESIA.;  Surgeon: Galo Pathak MD;  Location: [facility] OR     CATARACT IOL, RT/LT       IR LUMBAR EPIDURAL STEROID INJECTION  10/06/2005     IR LUMBAR EPIDURAL STEROID INJECTION  11/09/2005     Left fibular fracture ORIF  2013     REPLACEMENT TOTAL KNEE Left 2020     REPLACEMENT TOTAL KNEE Right 11/2021     ROTATOR CUFF REPAIR RT/LT Right 2017       CURRENT MEDICATIONS:   Current Outpatient Medications   Medication     acyclovir (ZOVIRAX) 400 MG tablet     albuterol (PROAIR HFA/PROVENTIL HFA/VENTOLIN HFA) 108 (90 Base) MCG/ACT inhaler     aspirin (ASA) 81 MG chewable tablet     Ergocalciferol 50 MCG (2000 UT) TABS     fish oil-omega-3 fatty acids 1000 MG capsule     furosemide (LASIX) 40 MG tablet     gabapentin (NEURONTIN) 100 MG capsule     [START ON 9/26/2022] hydrochlorothiazide (HYDRODIURIL) 50 MG tablet     magnesium 100 MG CAPS     methocarbamol (ROBAXIN) 500 MG tablet     metoprolol succinate ER (TOPROL XL) 25 MG 24 hr tablet     mupirocin (BACTROBAN) 2 % external ointment     nitroGLYcerin (NITROSTAT) 0.4 MG sublingual tablet     omeprazole (PRILOSEC) 20 MG DR capsule     oxyCODONE (ROXICODONE) 5 MG tablet     pantoprazole (PROTONIX) 40 MG EC tablet     PARoxetine (PAXIL) 20 MG tablet     polyethylene glycol (MIRALAX) 17 GM/Dose powder     pravastatin (PRAVACHOL) 10 MG  tablet     senna-docusate (SENOKOT-S/PERICOLACE) 8.6-50 MG tablet     sodium fluoride dental gel (PREVIDENT) 1.1 % GEL topical gel     sulfamethoxazole-trimethoprim (BACTRIM) 400-80 MG tablet     zolpidem (AMBIEN) 5 MG tablet     No current facility-administered medications for this visit.       ALLERGIES:      Allergies   Allergen Reactions     Atorvastatin      Kiwi      Latex      Other Drug Allergy (See Comments)      X ray dye and shingrex vaccine        ROS:  Review of symptoms otherwise negative unless commented about in HPI.     LABS:  Last Basic Metabolic Panel:  Lab Results   Component Value Date     09/19/2022      Lab Results   Component Value Date    POTASSIUM 4.4 09/19/2022    POTASSIUM 4.1 09/14/2022     Lab Results   Component Value Date    CHLORIDE 99 09/19/2022     Lab Results   Component Value Date    ALYCE 8.3 09/19/2022     Lab Results   Component Value Date    CO2 29 09/19/2022     Lab Results   Component Value Date    BUN 14.5 09/19/2022     Lab Results   Component Value Date    CR 0.69 09/19/2022     Lab Results   Component Value Date     09/19/2022     09/16/2022       Last CBC:   Lab Results   Component Value Date    WBC 8.2 09/19/2022     Lab Results   Component Value Date    RBC 2.62 09/19/2022     Lab Results   Component Value Date    HGB 7.8 09/19/2022     Lab Results   Component Value Date    HCT 24.1 09/19/2022     No components found for: MCT  Lab Results   Component Value Date    MCV 92 09/19/2022     Lab Results   Component Value Date    MCH 29.8 09/19/2022     Lab Results   Component Value Date    MCHC 32.4 09/19/2022     Lab Results   Component Value Date    RDW 14.6 09/19/2022     Lab Results   Component Value Date     09/19/2022       INR:  Lab Results   Component Value Date    INR 1.30 09/14/2022    INR 1.41 09/14/2022    INR 1.48 09/14/2022    INR 1.08 09/08/2022         IMAGING: EKG    PHYSICAL EXAM:   There were no vitals taken for this  visit.  General: alert and oriented x 3, pleasant, no acute distress, normal mood and affect  Neuro: Intact with no focal deficits   CV: Irregularly irregular, no murmurs, rubs or gallops, regular rate and rhythm, no peripheral edema  Pulm: bilateral breath sounds, clear to auscultation, easy work of breathing  Incision: incisions clean dry and intact without erythema, swelling or drainage    PROCEDURES: None         RACHEL MONTELONGO

## 2022-09-20 NOTE — ED PROVIDER NOTES
ED Triage Provider Note  [facility]  Encounter Date: Sep 20, 2022    History:  No chief complaint on file.    Luis Alberto Garcia is a 72 year old female who presents to the ED with irregular heartbeat feeling. She went to the heart clinic today and had an EKG.  Is feeling dizzy, lightheaded, sweaty, seeing black spots.  Patient had 5 vessel cardiac bypass surgery on September 14 and was discharged from the hospital yesterday.  Patient was sent to the emergency department from cardiac surgery clinic today and they are recommending admission, amiodarone bolus and drip for 24 hours.  Patient had been in and out of an irregular rhythm with heart rates up to 140.  EKG in the clinic was showing occasional rapid A. fib with occasional NSR.    Review of Systems:  No fever.     Exam:  BP 97/58 (BP Location: Left arm, Patient Position: Chair, Cuff Size: Adult Regular)   Temp 98.2  F (36.8  C) (Oral)   Resp 16   SpO2 97%   General: No acute distress. Appears stated age.   Cardio: Regular rate, extremities well perfused  Resp: Normal work of breathing, grossly normal respiratory rate  Neuro: Alert. CN II-XII grossly intact. Grossly intact strength.   Pulse irregular in triage     Medical Decision Making:  Patient arriving to the ED with problem as above. A medical screening exam was performed. Lab orders initiated from Triage. The patient is appropriate to be immediately roomed.    Labs were ordered, since patient has not yet on the monitor, will wait till she is roomed and on the monitor before starting amiodarone.      Trinidad Kim MD on 9/20/2022 at 3:13 PM     Trinidad Kim MD  09/20/22 1521

## 2022-09-20 NOTE — H&P
Cardiothoracic Surgery H&P Note  Date: 09/20/2022    Assessment/Plans:  Luis Alberto Garcia is a 72 year old female who presented to [facility] on 9/20/2022 d/2 afib/rvr while in clinic. They have a pmh of recent CABG 5v (9/14/2022) d/2 severe CAD, mild moderate aortic regurg, HTN. Cardiothoracic Surgery is admitting this pt for management of the afib/rvr after the pt had presented to clinic d/2 palpitations and light-headedness.    Vitals w/ tachycardia to 120s on monitor but is currently hemodynamically stable, but. Physical exam w/ tachycardia on monitoring, but otherwise benign. Labs demonstrated normal WBC/Hgb - electrolytes currently pending.    -MMP  -supplemental O2 prn  -continuous telemetry  -amiodarone gtt  -low dose heparin gtt (no loading or subsequent boluses); CHADSVASC = 4  -regular diet  -no mIVF  -will f/up electrolytes - will replete any abnormal electrolytes  -local wound care to surgical incision  -cont home meds  -discussed pt w/ Dr. Orville Devine MD (Fellow)    Dispo: admit to Cardiology 1    BRANDO YATES MD  PGY2, Surgery      HPI:   Luis Alberto Garcia is a 72 year old female who presented to [facility] on 9/20/2022 d/2 afib/rvr while in clinic. They have a pmh of recent CABG 5v (9/14/2022) d/2 severe CAD, mild moderate aortic regurg, HTN. Cardiothoracic Surgery is admitting this pt for management of the afib/rvr    -was recently discharged on 9/19/2022  -came to clinic earlier on 9/20/2022 d/2 palpitations, light headedness, and irregular rhythm on apple watch  -was noted to be in afibb/rvr w/ HR in 140s and was transferred to ED for w/up and management  -received amiodarone boluse while in ED and started on amiodarone gtt  -continues to have lightheadedness when she sits up or ambulates  -endorses mild SOB on exertion  -drains have been removed prior to discharge - denies any significant drainage  -pain is moderately well controlled w/ oxycodone at surgical wound  -tolerates PO  -denies fevers or  chills    ROS:  10 system ROS negative unless otherwise indicated    PMH:  Luis Alberto Garcia has a past medical history of Anxiety, Centrilobular emphysema (H), Childhood asthma, Coronary artery disease involving native coronary artery of native heart, Esophageal reflux, Essential hypertension, Fatty liver disease, Hair loss, Migraine, Mild aortic stenosis, Mixed hyperlipidemia, MRSA infection, Osteoarthritis, and Primary insomnia.    PSH:  Luis Alberto Garcia has a past surgical history that includes IR Lumbar Epidural Steroid Injection (10/06/2005); IR Lumbar Epidural Steroid Injection (2005); Replacement Total Knee (Left, ); rotator cuff repair rt/lt (Right, ); Left fibular fracture ORIF (); cataract iol, rt/lt; Replacement Total Knee (Right, 2021); and Bypass graft artery coronary (N/A, 2022).    ALLERGIES:  Atorvastatin, Kiwi, Latex, and Other drug allergy (see comments)    FamHx:  Luis Alberto Garcia's family history includes Hypothyroidism in her sister and sister; LUNG DISEASE in her father; Lung Cancer in her mother; Osteosarcoma in her sister.    SocHx:  Luis Alberto Garcia reports that she quit smoking about 30 years ago. Her smoking use included cigarettes. She has a 25.00 pack-year smoking history. She has never used smokeless tobacco. She reports current alcohol use.      Objective:  Vitals:  B/P: 123/86, T: 98.2, P: 99, R: 16    Temp: 98.2  F (36.8  C) Temp  Min: 98.2  F (36.8  C)  Max: 98.2  F (36.8  C)  Resp: 16 Resp  Min: 16  Max: 16  SpO2: 95 % SpO2  Min: 94 %  Max: 97 %  Pulse: 99 Pulse  Min: 70  Max: 132    No data recorded  BP: 123/86 Systolic (24hrs), Av , Min:97 , Max:129   Diastolic (24hrs), Av, Min:58, Max:86      No intake or output data in the 24 hours ending 22 1730    Physical Exam:  Gen: well-dress; NAD  N:AOx3  HEENT: trachea midline, atraumatic, anicteric  P: normal WOB on RA  CV: tachy to 120s on monitor - possible afib on monitor; no JVD; midline  sternotomy would healing well - no surrounding erythema, fluctuance, crepitus or discharge; previous drain sites dressed w/ minimal strikethrough  GI: soft, ND, NTTP, no guarding, no rigidity - nonperitonitic  : deferred  MSK: moves all extremties  Extremities: WWP    Labs:  Recent Labs   Lab 09/20/22  1533 09/19/22  0517 09/18/22  0602 09/17/22  0609   WBC 9.1 8.2 8.8 10.7   HGB 8.8* 7.8* 7.9* 8.0*    263 239 194     Recent Labs   Lab 09/19/22  0517 09/18/22  0602 09/17/22  0609 09/16/22  2340 09/16/22  1142 09/16/22  0455   * 136 136  --   --  130*   POTASSIUM 4.4 4.2 4.4  4.4  --   --  4.9   CHLORIDE 99 103 103  --   --  99   CO2 29 25 26  --   --  28   BUN 14.5 14.9 20.5  --   --  30.4*   CR 0.69 0.61 0.64  --   --  0.90   * 106* 106* 123*   < > 109*    < > = values in this interval not displayed.     Recent Labs   Lab 09/19/22  0517 09/18/22  0602 09/17/22  0609 09/16/22  0455   MAG 2.0 2.1 2.3 2.6*   PHOS 3.7 2.5 2.2* 2.7     Recent Labs   Lab 09/17/22  0609 09/16/22 0455 09/15/22  0332 09/14/22 2026 09/14/22  1540 09/14/22  1400   AST 56* 72* 48* 36*  --   --    ALT 21 19 16 13  --   --    ALKPHOS 51 51 51 45  --   --    BILITOTAL 0.3 0.3 0.4 0.5  --   --    ALBUMIN 3.0* 3.2* 3.1* 2.5*  --   --    INR  --   --   --  1.30* 1.41* 1.48*   PTT  --   --   --  32 39* 32     Recent Labs   Lab 09/14/22 2026 09/14/22  1540 09/14/22  1515 09/14/22  1506   LACT 1.7 1.2 1.4 1.5       Studies:  Recent Results (from the past 24 hour(s))   XR Chest Port 1 View    Narrative    Exam: XR CHEST PORT 1 VIEW, 9/20/2022 4:52 PM    Indication: palpitations    Comparison: 19 2022    Findings:   Median sternotomy wires and mediastinal surgical clips. Heart borders  are partially obscured. Increasing left pleural effusion and  associated atelectasis/consolidation. No effusion on the right. No  appreciable pneumothorax. Question minimal right basilar airspace  opacities.      Impression    Impression:   1.  Increasing left pleural effusion and associated  atelectasis/consolidation.  2. Question minimal right basilar airspace opacities suggesting  atelectasis.    I have personally reviewed the examination and initial interpretation  and I agree with the findings.    TISHA ROMO,          SYSTEM ID:  [id number]     Patient seen and examined. Agree with plan.

## 2022-09-20 NOTE — PLAN OF CARE
Occupational Therapy and Cardiac Rehab Discharge Summary    Reason for therapy discharge:    Discharged to home with outpatient therapy.    Progress towards therapy goal(s). See goals on Care Plan in [facility] electronic health record for goal details.  Goals partially met.  Barriers to achieving goals:   discharge from facility.    Therapy recommendation(s):    Continued therapy is recommended.  Rationale/Recommendations:  Home with OP CR.

## 2022-09-20 NOTE — ED PROVIDER NOTES
[place] EMERGENCY DEPARTMENT ([facility])  September 20, 2022      History     Chief Complaint   Patient presents with     Irregular Heart Beat     HPI  Luis Alberto Garcia is a 72 year old female who with a history of coronary artery disease, hyperlipidemia and mild emphysema. She is postop from a a CABG on 9/14/2022. She was discharged from the hospital yesterday. Her CABG was 5 vessels and she recovered from the procedure well. This morning, she noted the feeling of palpitations and irregular heartbeat with diaphoresis. There was some discomfort radiating into her neck, and she does feel lightheaded.  She had a follow-up in cardiovascular surgery clinic where they did an EKG that day that showed new atrial fibrillation. She was sent here to the ED to initiate amiodarone infusion for rate control of atrial fibrillation with the intention of her to be readmitted to cardiovascular surgery.  At this time, the patient denies any neck pain but does feel dizzy and lightheaded and like her palpitations are continuing. There has been no nausea or vomiting. Sternotomy incision is closed and healing as expected. A pump access site has been draining a mild amount of bloody fluid but this has not changed since discharge from the hospital. She is anticoagulated on aspirin.    Past Medical History  Past Medical History:   Diagnosis Date     Anxiety      Centrilobular emphysema (H)      Childhood asthma      Coronary artery disease involving native coronary artery of native heart      Esophageal reflux      Essential hypertension      Fatty liver disease      Hair loss      Migraine      Mild aortic stenosis      Mixed hyperlipidemia      MRSA infection     Skin abscesses after COVID vaccinations     Osteoarthritis      Primary insomnia      Past Surgical History:   Procedure Laterality Date     BYPASS GRAFT ARTERY CORONARY N/A 9/14/2022    Procedure: MEDIAN STERNOTOMY, CORONARY ARTERY BYPASS GRAFT (CABG) X  5 USING  THE LEFT INTERNAL MAMMARY AND ENDOSCOPICALLY HARVESTED RIGHT AND LEFT SAPHENOUS VEIN, ON CARDIOPULMONARY BYPASS, INTRAOPERATIVE TRANSESOPHAGEAL ECHOCARDIOGRAM BY ANESTHESIA.;  Surgeon: Galo Pathak MD;  Location: [facility] OR     CATARACT IOL, RT/LT       IR LUMBAR EPIDURAL STEROID INJECTION  10/06/2005     IR LUMBAR EPIDURAL STEROID INJECTION  11/09/2005     Left fibular fracture ORIF  2013     REPLACEMENT TOTAL KNEE Left 2020     REPLACEMENT TOTAL KNEE Right 11/2021     ROTATOR CUFF REPAIR RT/LT Right 2017     acyclovir (ZOVIRAX) 400 MG tablet  albuterol (PROAIR HFA/PROVENTIL HFA/VENTOLIN HFA) 108 (90 Base) MCG/ACT inhaler  aspirin (ASA) 81 MG chewable tablet  Ergocalciferol 50 MCG (2000 UT) TABS  fish oil-omega-3 fatty acids 1000 MG capsule  furosemide (LASIX) 40 MG tablet  gabapentin (NEURONTIN) 100 MG capsule  [START ON 9/26/2022] hydrochlorothiazide (HYDRODIURIL) 50 MG tablet  magnesium 100 MG CAPS  methocarbamol (ROBAXIN) 500 MG tablet  metoprolol succinate ER (TOPROL XL) 25 MG 24 hr tablet  mupirocin (BACTROBAN) 2 % external ointment  nitroGLYcerin (NITROSTAT) 0.4 MG sublingual tablet  omeprazole (PRILOSEC) 20 MG DR capsule  oxyCODONE (ROXICODONE) 5 MG tablet  pantoprazole (PROTONIX) 40 MG EC tablet  PARoxetine (PAXIL) 20 MG tablet  polyethylene glycol (MIRALAX) 17 GM/Dose powder  pravastatin (PRAVACHOL) 10 MG tablet  senna-docusate (SENOKOT-S/PERICOLACE) 8.6-50 MG tablet  sodium fluoride dental gel (PREVIDENT) 1.1 % GEL topical gel  sulfamethoxazole-trimethoprim (BACTRIM) 400-80 MG tablet  zolpidem (AMBIEN) 5 MG tablet      Allergies   Allergen Reactions     Atorvastatin      Kiwi      Latex      Other Drug Allergy (See Comments)      X ray dye and shingrex vaccine      Family History  Family History   Problem Relation Age of Onset     Lung Cancer Mother      LUNG DISEASE Father      Hypothyroidism Sister      Hypothyroidism Sister      Osteosarcoma Sister      Anesthesia Reaction No family hx of      Social  "History   Social History     Tobacco Use     Smoking status: Former Smoker     Packs/day: 1.00     Years: 25.00     Pack years: 25.00     Types: Cigarettes     Quit date: 1992     Years since quittin.6     Smokeless tobacco: Never Used   Substance Use Topics     Alcohol use: Yes     Comment: socially      Past medical history, past surgical history, medications, allergies, family history, and social history were reviewed with the patient. No additional pertinent items.       Review of Systems   Gastrointestinal: Negative for nausea and vomiting.   Musculoskeletal: Negative for neck pain.   Neurological: Positive for dizziness and light-headedness.     A complete review of systems was performed with pertinent positives and negatives noted in the HPI, and all other systems negative.    Physical Exam   BP: 97/58  Pulse: (!) 121  Temp: 98.2  F (36.8  C)  Resp: 16  Height: 167.6 cm (5' 6\")  Weight: 85.3 kg (188 lb)  SpO2: 97 %     Physical Exam  Gen:A&Ox3, diaphoretic  HEENT:PERRL, no facial tenderness or wounds, head atraumatic, oropharynx clear, mucous membranes moist, TMs clear bilaterally  Neck:no bony tenderness or step offs, no JVD, trachea midline  Back: no CVA tenderness, no midline bony tenderness  CV: tachycardia, irregular rhythm  PULM:Clear to auscultation bilaterally  Abd:soft, nontender, nondistended. Bowel sounds present and normal  UE:No traumatic injuries, skin normal  LE:no traumatic injuries, skin normal, no LE edema or calf tenderness  Neuro:CN II-XII intact, strength 5/5 throughout, gait stable.   Skin: no rashes or ecchymoses. Sternotomy incision clean, dry and intact  ED Course      Procedures            EKG Interpretation:      Interpreted by Gisel Epps MD  Time reviewed: 3:56pm  Symptoms at time of EKG: palpitations  Rhythm: atrial fibrillation  Rate: 129, irregular  Axis: normal  Ectopy: none  Conduction: normal  ST Segments/ T Waves: Lateral T wave inversion  Q Waves: " none  Comparison to prior: new t wave inversions and atrial fibrillation    Clinical Impression: atrial fibrillation           Results for orders placed or performed during the hospital encounter of 09/20/22   [place] Draw     Status: None (In process)    Narrative    The following orders were created for panel order [place] Draw.  Procedure                               Abnormality         Status                     ---------                               -----------         ------                     Extra Blue Top Tube[[id number]]                              In process                 Extra Red Top Tube[[id number]]                               In process                   Please view results for these tests on the individual orders.   CBC with platelets and differential     Status: Abnormal   Result Value Ref Range    WBC Count 9.1 4.0 - 11.0 10e3/uL    RBC Count 2.94 (L) 3.80 - 5.20 10e6/uL    Hemoglobin 8.8 (L) 11.7 - 15.7 g/dL    Hematocrit 27.3 (L) 35.0 - 47.0 %    MCV 93 78 - 100 fL    MCH 29.9 26.5 - 33.0 pg    MCHC 32.2 31.5 - 36.5 g/dL    RDW 14.8 10.0 - 15.0 %    Platelet Count 340 150 - 450 10e3/uL    % Neutrophils 68 %    % Lymphocytes 16 %    % Monocytes 9 %    % Eosinophils 3 %    % Basophils 1 %    % Immature Granulocytes 3 %    NRBCs per 100 WBC 0 <1 /100    Absolute Neutrophils 6.3 1.6 - 8.3 10e3/uL    Absolute Lymphocytes 1.4 0.8 - 5.3 10e3/uL    Absolute Monocytes 0.9 0.0 - 1.3 10e3/uL    Absolute Eosinophils 0.2 0.0 - 0.7 10e3/uL    Absolute Basophils 0.1 0.0 - 0.2 10e3/uL    Absolute Immature Granulocytes 0.3 <=0.4 10e3/uL    Absolute NRBCs 0.0 10e3/uL   EKG 12 lead     Status: None (Preliminary result)   Result Value Ref Range    Systolic Blood Pressure  mmHg    Diastolic Blood Pressure  mmHg    Ventricular Rate 129 BPM    Atrial Rate 108 BPM    WY Interval  ms    QRS Duration 80 ms     ms    QTc 451 ms    P Axis  degrees    R AXIS 62 degrees    T Axis 210 degrees    Interpretation ECG        Atrial fibrillation with rapid ventricular response  T wave abnormality, consider inferolateral ischemia  Abnormal ECG     CBC with platelets differential     Status: Abnormal    Narrative    The following orders were created for panel order CBC with platelets differential.  Procedure                               Abnormality         Status                     ---------                               -----------         ------                     CBC with platelets and d...[[id number]]  Abnormal            Final result                 Please view results for these tests on the individual orders.   Results for orders placed or performed in visit on 09/20/22   EKG 12-lead, tracing only     Status: None (Preliminary result)   Result Value Ref Range    Systolic Blood Pressure  mmHg    Diastolic Blood Pressure  mmHg    Ventricular Rate 78 BPM    Atrial Rate 78 BPM    VA Interval 136 ms    QRS Duration 84 ms     ms    QTc 376 ms    P Axis 50 degrees    R AXIS 41 degrees    T Axis 96 degrees    Interpretation ECG       Sinus rhythm  Possible Left atrial enlargement  Nonspecific T wave abnormality  Abnormal ECG  When compared with ECG of 16-SEP-2022 13:35,  ST no longer elevated in Lateral leads  Nonspecific T wave abnormality, worse in Inferior leads  Nonspecific T wave abnormality now evident in Lateral leads       Medications   amiodarone (NEXTERONE) bolus 150 mg (has no administration in time range)   amiodarone (CORDARONE) infusion ADULT STANDARD 900 mg/500 mL (1.8 mg/mL) mixture (has no administration in time range)   amiodarone (CORDARONE) infusion ADULT STANDARD 900 mg/500 mL (1.8 mg/mL) mixture (has no administration in time range)        Assessments & Plan (with Medical Decision Making)   72-year-old female with postop day 4 from 5 vessel CABG who presents with new atrial fibrillation arrives afebrile tachycardic with a heart rate of 121 but irregular BP 97/58 respiratory and work of breathing not  labored.     IV access was obtained and she was monitored on telemetry throughout her time in the ED. She had persistent irregular tachycardia in the rate of 120s to 130s. EKG confirmed atrial fibrillation and does show some new T wave inversions laterally.     Cardiovascular surgery was consulted it was recommended to start her on amiodarone bolus and infusion.   Admitted to CV surgery under Dr. Galo Pathak    I have reviewed the nursing notes. I have reviewed the findings, diagnosis, plan and need for follow up with the patient.    New Prescriptions    No medications on file       Final diagnoses:   Atrial fibrillation, unspecified type (H)   S/P CABG (coronary artery bypass graft)   I, Yolanda Brennan, am serving as a trained medical scribe to document services personally performed by Gisel Epps MD, based on the provider's statements to me.      IGisel MD, was physically present and have reviewed and verified the accuracy of this note documented by Yolanda Brennan.     --  Gisel Epps MD  [facility] EMERGENCY DEPARTMENT  9/20/2022     Gisel Epps MD  09/20/22 2356

## 2022-09-20 NOTE — TELEPHONE ENCOUNTER
Patient called regarding a irregular heart beat. Patient is s/p CABG with Dr. Pathak on 9/14 and was discharged on 9/19.   Patient reports irregular heart beat, states her pulse is going from  since this morning;  reports dizziness when she stands up; states she had neck pain and heart palpitations this morning; reports BP of 130/70 without much change from yesterday.   Pulse oxygenation is running between 90-95.   Jessica Astudillo NP was consulted and agrees to plan for patient to come to clinic today at 1330 to [facility] for visit and an EKG to be done during the visit.   Patient verbalized understanding and agreed to the plan.

## 2022-09-21 ENCOUNTER — APPOINTMENT (OUTPATIENT)
Dept: CARDIOLOGY | Facility: CLINIC | Age: 72
DRG: 309 | End: 2022-09-21
Attending: NURSE PRACTITIONER
Payer: COMMERCIAL

## 2022-09-21 ENCOUNTER — NURSE TRIAGE (OUTPATIENT)
Dept: NURSING | Facility: CLINIC | Age: 72
End: 2022-09-21

## 2022-09-21 VITALS
RESPIRATION RATE: 18 BRPM | WEIGHT: 188 LBS | DIASTOLIC BLOOD PRESSURE: 63 MMHG | BODY MASS INDEX: 30.22 KG/M2 | SYSTOLIC BLOOD PRESSURE: 121 MMHG | OXYGEN SATURATION: 95 % | HEIGHT: 66 IN | HEART RATE: 84 BPM | TEMPERATURE: 98.6 F

## 2022-09-21 PROBLEM — Z95.1 S/P CABG X 5: Status: ACTIVE | Noted: 2022-09-21

## 2022-09-21 LAB
ALBUMIN SERPL BCG-MCNC: 3.2 G/DL (ref 3.5–5.2)
ALBUMIN SERPL BCG-MCNC: 3.4 G/DL (ref 3.5–5.2)
ALP SERPL-CCNC: 60 U/L (ref 35–104)
ALP SERPL-CCNC: 64 U/L (ref 35–104)
ALT SERPL W P-5'-P-CCNC: 30 U/L (ref 10–35)
ALT SERPL W P-5'-P-CCNC: 33 U/L (ref 10–35)
ANION GAP SERPL CALCULATED.3IONS-SCNC: 11 MMOL/L (ref 7–15)
ANION GAP SERPL CALCULATED.3IONS-SCNC: 9 MMOL/L (ref 7–15)
AST SERPL W P-5'-P-CCNC: 30 U/L (ref 10–35)
AST SERPL W P-5'-P-CCNC: 65 U/L (ref 10–35)
BILIRUB SERPL-MCNC: 0.3 MG/DL
BILIRUB SERPL-MCNC: 0.3 MG/DL
BUN SERPL-MCNC: 13.4 MG/DL (ref 8–23)
BUN SERPL-MCNC: 13.6 MG/DL (ref 8–23)
CALCIUM SERPL-MCNC: 8.6 MG/DL (ref 8.8–10.2)
CALCIUM SERPL-MCNC: 8.7 MG/DL (ref 8.8–10.2)
CHLORIDE SERPL-SCNC: 101 MMOL/L (ref 98–107)
CHLORIDE SERPL-SCNC: 96 MMOL/L (ref 98–107)
CREAT SERPL-MCNC: 0.61 MG/DL (ref 0.51–0.95)
CREAT SERPL-MCNC: 0.63 MG/DL (ref 0.51–0.95)
DEPRECATED HCO3 PLAS-SCNC: 24 MMOL/L (ref 22–29)
DEPRECATED HCO3 PLAS-SCNC: 25 MMOL/L (ref 22–29)
ERYTHROCYTE [DISTWIDTH] IN BLOOD BY AUTOMATED COUNT: 15 % (ref 10–15)
GFR SERPL CREATININE-BSD FRML MDRD: >90 ML/MIN/1.73M2
GFR SERPL CREATININE-BSD FRML MDRD: >90 ML/MIN/1.73M2
GLUCOSE SERPL-MCNC: 112 MG/DL (ref 70–99)
GLUCOSE SERPL-MCNC: 130 MG/DL (ref 70–99)
HCT VFR BLD AUTO: 26.4 % (ref 35–47)
HGB BLD-MCNC: 8.5 G/DL (ref 11.7–15.7)
LVEF ECHO: NORMAL
MCH RBC QN AUTO: 29.9 PG (ref 26.5–33)
MCHC RBC AUTO-ENTMCNC: 32.2 G/DL (ref 31.5–36.5)
MCV RBC AUTO: 93 FL (ref 78–100)
MRSA DNA SPEC QL NAA+PROBE: NEGATIVE
PHOSPHATE SERPL-MCNC: 3.3 MG/DL (ref 2.5–4.5)
PLATELET # BLD AUTO: 312 10E3/UL (ref 150–450)
POTASSIUM SERPL-SCNC: 3.9 MMOL/L (ref 3.4–5.3)
POTASSIUM SERPL-SCNC: 5.1 MMOL/L (ref 3.4–5.3)
PROCALCITONIN SERPL IA-MCNC: 0.06 NG/ML
PROCALCITONIN SERPL IA-MCNC: 0.06 NG/ML
PROT SERPL-MCNC: 5.9 G/DL (ref 6.4–8.3)
PROT SERPL-MCNC: 6.5 G/DL (ref 6.4–8.3)
RBC # BLD AUTO: 2.84 10E6/UL (ref 3.8–5.2)
SA TARGET DNA: NEGATIVE
SODIUM SERPL-SCNC: 130 MMOL/L (ref 136–145)
SODIUM SERPL-SCNC: 136 MMOL/L (ref 136–145)
UFH PPP CHRO-ACNC: 0.34 IU/ML
WBC # BLD AUTO: 10.2 10E3/UL (ref 4–11)

## 2022-09-21 PROCEDURE — 250N000013 HC RX MED GY IP 250 OP 250 PS 637

## 2022-09-21 PROCEDURE — 93325 DOPPLER ECHO COLOR FLOW MAPG: CPT

## 2022-09-21 PROCEDURE — 93272 ECG/REVIEW INTERPRET ONLY: CPT | Performed by: INTERNAL MEDICINE

## 2022-09-21 PROCEDURE — 87205 SMEAR GRAM STAIN: CPT

## 2022-09-21 PROCEDURE — 93321 DOPPLER ECHO F-UP/LMTD STD: CPT | Mod: 26 | Performed by: INTERNAL MEDICINE

## 2022-09-21 PROCEDURE — 99222 1ST HOSP IP/OBS MODERATE 55: CPT | Mod: 25 | Performed by: INTERNAL MEDICINE

## 2022-09-21 PROCEDURE — 85520 HEPARIN ASSAY: CPT

## 2022-09-21 PROCEDURE — 93321 DOPPLER ECHO F-UP/LMTD STD: CPT

## 2022-09-21 PROCEDURE — 93270 REMOTE 30 DAY ECG REV/REPORT: CPT

## 2022-09-21 PROCEDURE — 93325 DOPPLER ECHO COLOR FLOW MAPG: CPT | Mod: 26 | Performed by: INTERNAL MEDICINE

## 2022-09-21 PROCEDURE — 36415 COLL VENOUS BLD VENIPUNCTURE: CPT

## 2022-09-21 PROCEDURE — 250N000011 HC RX IP 250 OP 636: Performed by: NURSE PRACTITIONER

## 2022-09-21 PROCEDURE — 99207 PR NO BILLABLE SERVICE THIS VISIT: CPT | Mod: 26 | Performed by: STUDENT IN AN ORGANIZED HEALTH CARE EDUCATION/TRAINING PROGRAM

## 2022-09-21 PROCEDURE — 93308 TTE F-UP OR LMTD: CPT | Mod: 26 | Performed by: INTERNAL MEDICINE

## 2022-09-21 PROCEDURE — 84100 ASSAY OF PHOSPHORUS: CPT

## 2022-09-21 PROCEDURE — 85014 HEMATOCRIT: CPT

## 2022-09-21 PROCEDURE — 80053 COMPREHEN METABOLIC PANEL: CPT

## 2022-09-21 PROCEDURE — 87641 MR-STAPH DNA AMP PROBE: CPT | Performed by: STUDENT IN AN ORGANIZED HEALTH CARE EDUCATION/TRAINING PROGRAM

## 2022-09-21 PROCEDURE — 250N000011 HC RX IP 250 OP 636

## 2022-09-21 RX ORDER — ALBUTEROL SULFATE 90 UG/1
2 AEROSOL, METERED RESPIRATORY (INHALATION) EVERY 6 HOURS
Status: DISCONTINUED | OUTPATIENT
Start: 2022-09-21 | End: 2022-09-21

## 2022-09-21 RX ORDER — ONDANSETRON 2 MG/ML
4 INJECTION INTRAMUSCULAR; INTRAVENOUS EVERY 6 HOURS PRN
Status: DISCONTINUED | OUTPATIENT
Start: 2022-09-21 | End: 2022-09-21 | Stop reason: HOSPADM

## 2022-09-21 RX ORDER — ALBUTEROL SULFATE 90 UG/1
2 AEROSOL, METERED RESPIRATORY (INHALATION) EVERY 6 HOURS
Status: DISCONTINUED | OUTPATIENT
Start: 2022-09-21 | End: 2022-09-21 | Stop reason: HOSPADM

## 2022-09-21 RX ORDER — FUROSEMIDE 10 MG/ML
20 INJECTION INTRAMUSCULAR; INTRAVENOUS ONCE
Status: COMPLETED | OUTPATIENT
Start: 2022-09-21 | End: 2022-09-21

## 2022-09-21 RX ORDER — FUROSEMIDE 20 MG
40 TABLET ORAL DAILY
Status: DISCONTINUED | OUTPATIENT
Start: 2022-09-21 | End: 2022-09-21 | Stop reason: HOSPADM

## 2022-09-21 RX ORDER — DILTIAZEM HCL 60 MG
60 TABLET ORAL 4 TIMES DAILY PRN
Qty: 6 TABLET | Refills: 0 | Status: ON HOLD | OUTPATIENT
Start: 2022-09-21 | End: 2022-09-23

## 2022-09-21 RX ORDER — ACETAMINOPHEN 325 MG/1
650 TABLET ORAL EVERY 6 HOURS PRN
Status: DISCONTINUED | OUTPATIENT
Start: 2022-09-21 | End: 2022-09-21 | Stop reason: HOSPADM

## 2022-09-21 RX ORDER — ASPIRIN 81 MG/1
162 TABLET, CHEWABLE ORAL DAILY
Status: DISCONTINUED | OUTPATIENT
Start: 2022-09-21 | End: 2022-09-21 | Stop reason: HOSPADM

## 2022-09-21 RX ORDER — METHOCARBAMOL 500 MG/1
1000 TABLET, FILM COATED ORAL EVERY 6 HOURS PRN
Status: DISCONTINUED | OUTPATIENT
Start: 2022-09-21 | End: 2022-09-21 | Stop reason: HOSPADM

## 2022-09-21 RX ORDER — HEPARIN SODIUM 10000 [USP'U]/100ML
0-5000 INJECTION, SOLUTION INTRAVENOUS CONTINUOUS
Status: DISCONTINUED | OUTPATIENT
Start: 2022-09-21 | End: 2022-09-21 | Stop reason: HOSPADM

## 2022-09-21 RX ORDER — LIDOCAINE 40 MG/G
CREAM TOPICAL
Status: DISCONTINUED | OUTPATIENT
Start: 2022-09-21 | End: 2022-09-21 | Stop reason: HOSPADM

## 2022-09-21 RX ORDER — PAROXETINE 20 MG/1
20 TABLET, FILM COATED ORAL EVERY MORNING
Status: DISCONTINUED | OUTPATIENT
Start: 2022-09-21 | End: 2022-09-21 | Stop reason: HOSPADM

## 2022-09-21 RX ORDER — OXYCODONE HYDROCHLORIDE 5 MG/1
5-10 TABLET ORAL
Status: DISCONTINUED | OUTPATIENT
Start: 2022-09-21 | End: 2022-09-21 | Stop reason: HOSPADM

## 2022-09-21 RX ORDER — ZOLPIDEM TARTRATE 5 MG/1
5 TABLET ORAL
Status: DISCONTINUED | OUTPATIENT
Start: 2022-09-21 | End: 2022-09-21 | Stop reason: HOSPADM

## 2022-09-21 RX ORDER — SULFAMETHOXAZOLE AND TRIMETHOPRIM 400; 80 MG/1; MG/1
1 TABLET ORAL 2 TIMES DAILY
Status: DISCONTINUED | OUTPATIENT
Start: 2022-09-21 | End: 2022-09-21 | Stop reason: HOSPADM

## 2022-09-21 RX ORDER — SULFAMETHOXAZOLE AND TRIMETHOPRIM 400; 80 MG/1; MG/1
1 TABLET ORAL 2 TIMES DAILY
Qty: 14 TABLET | Refills: 0 | COMMUNITY
Start: 2022-09-21

## 2022-09-21 RX ORDER — PRAVASTATIN SODIUM 10 MG
10 TABLET ORAL EVERY MORNING
Status: DISCONTINUED | OUTPATIENT
Start: 2022-09-21 | End: 2022-09-21 | Stop reason: HOSPADM

## 2022-09-21 RX ADMIN — ALBUTEROL SULFATE 2 PUFF: 90 AEROSOL, METERED RESPIRATORY (INHALATION) at 06:16

## 2022-09-21 RX ADMIN — OXYCODONE HYDROCHLORIDE 5 MG: 5 TABLET ORAL at 06:17

## 2022-09-21 RX ADMIN — POLYETHYLENE GLYCOL 3350 17 G: 17 POWDER, FOR SOLUTION ORAL at 06:17

## 2022-09-21 RX ADMIN — DOXYCYCLINE HYCLATE 100 MG: 100 CAPSULE ORAL at 08:23

## 2022-09-21 RX ADMIN — FUROSEMIDE 20 MG: 10 INJECTION, SOLUTION INTRAVENOUS at 11:33

## 2022-09-21 RX ADMIN — HEPARIN SODIUM AND DEXTROSE 1000 UNITS/HR: 10000; 5 INJECTION INTRAVENOUS at 00:22

## 2022-09-21 RX ADMIN — FUROSEMIDE 40 MG: 20 TABLET ORAL at 08:23

## 2022-09-21 RX ADMIN — CEFEPIME HYDROCHLORIDE 2 G: 2 INJECTION, POWDER, FOR SOLUTION INTRAVENOUS at 06:20

## 2022-09-21 RX ADMIN — ZOLPIDEM TARTRATE 5 MG: 5 TABLET ORAL at 00:45

## 2022-09-21 RX ADMIN — OXYCODONE HYDROCHLORIDE 10 MG: 5 TABLET ORAL at 11:39

## 2022-09-21 ASSESSMENT — ACTIVITIES OF DAILY LIVING (ADL)
ADLS_ACUITY_SCORE: 35

## 2022-09-21 NOTE — PROGRESS NOTES
Cardiovascular Surgery Progress Note  09/21/2022      Summary: 72 year old woman who is s/p CABG X5 vessels 9/14/2022 and was discharged 9/19 in stable condition. She developed symptoms of irregular heart beat with tachycardia 9/20 am. She was seen urgently in the CV Surgery clinic 9/20 and was found to be in atrial fib with RVR. She was sent to the ED for Amiodarone treatment and possible Cardioversion. She was started on IV amiodarone load and IV heparin and self converted back to SR around midnight.            Assessment and Plan:     Cardiovascular:   Severe multivessel CAD s/p CABG x5   Moderate aortic insufficiency and mild aortic stenosis  HTN  Most recent echo showed LVEF 65%.   -  mg daily  - Patient does not want statin  - BB: Metoprolol tartrate 12.5 mg BID with hold parameters, changed to Toprol 25 mg daily  - PTA Toprol, lisinopril, amlodipine, hydrochlorothiazide and spironolactone on hold  Readmission 9/20 for Atrial fibrillation   - symptomatic atrial fib with RVR < 24 hours with IV amiodarone, will transition to oral taper dosing.   - consider increasing BB as well for rate control  - IV heparin discontinue, will hold off on anticoagulation at this time. If she has recurrent atrial fib, would then plan to start Eliquis with decreased dose of aspirin 81 mg daily   - Echocardiogram today to assess for possible effusion, preliminary report with no effusion    - Appreciate Cardiology assistance. Would agree with heart monitor at discharge with real time monitoring for five days.       Pulmonary:  Emphysema  Saturating well on room air at discharge.  - Supplemental O2 PRN to keep sats > 92%. Wean off as tolerated.  - Pulm hygiene, IS, activity and deep breathing  - PTA albuterol inhaler resumed  - Has left sided pleural effusion per CXR, confirmed by lung US by Medicine with ~ 400 ml fluid. Thoracentesis was offered, but patient declined. With her persistent orthopnea at night, did recommend she  have a thora but she again refused. Will continue diuresis until seen in follow up next week.      Neurology / MSK:  Acute post-operative pain   - Pain was an issue at prior admission, used Oxycodone, robaxin, toradol, ketamine, and lidocaine patches.   - PTA paxil can continued  - PTA Celebrex can be continued      / Renal / Fluid / Electrolytes:  Hypervolemia  No renal disease, Creatinine WNL  Weight is up 2 kg from discharge 9/19, on po lasix 40 mg daily  Consider IV lasix 20 mg X1, will defer to Cardiology        GI / Nutrition:   GERD  Fatty liver disease  - Consider bowel regimen  Had some nausea today after receiving Doxycycline and being NPO. Is better now and will resume diet.      Endocrine:  - BG      Infectious Disease:  Stress induced leukocytosis  WBC WNL, remains afebrile, no signs or symptoms of infection  - Was started on Bactrim X7 days 9/19 after lancing superior portion of sternal incision  - was given IV Cefepime and Doxy for possible CAP, stopped today   - Incision intact, no redness or signs of infection. Would stop Bactrim after five days of therapy with having some intermittent nausea      Hematology:   Acute blood loss anemia  Hgb 8.5; Plt WNL, no signs or symptoms of active bleeding     MSK/Skin:  Sternotomy  Surgical incision  - Sternal precautions  - Incisional cares per protocol     Prophylaxis:   - Stress ulcer prophylaxis: Pantoprazole 40 mg daily  - DVT prophylaxis: Subcutaneous heparin, SCD     Disposition:   - Discussed with Cardiology, would agree with discharge home later today  - Heart monitor at discharge  - Instructed patient to call if worsening shortness of breath as she may need thoracentesis, 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 Will continue lasix.    - Follow up with CVTS September 30, 2022 as previously scheduled.     Discussed with Dr Pathak and Cardiology through both written and verbal communication.   "    Michelle Cuevas, BARBIE, CNP  [facility] Cardiothoracic Surgery  O: [phone]  Pgr [phone]        Interval History:   Converted back to SR.   Has some persistent orthopnea at night.   Pain not well controlled as she has not had any pain medications, they were reordered this am   Sternum is stable without clicking or movement.          Physical Exam:   Blood pressure 116/61, pulse 86, temperature 98.6  F (37  C), temperature source Oral, resp. rate 18, height 1.676 m (5' 6\"), weight 85.3 kg (188 lb), SpO2 (!) 87 %, not currently breastfeeding.  Vitals:    09/20/22 1525   Weight: 85.3 kg (188 lb)      MAPs: 77-80    Gen: A&Ox4, NAD.  is at the bedside.   Neuro: no focal deficits   CV: RRR, normal S1 S2, no murmurs, rubs or gallops.   Pulm: CTA, no wheezing or rhonchi, normal breathing on RA  Abd: nondistended, normal BS, soft, nontender  Ext: no peripheral edema,   Incision: clean, dry, intact, no erythema, sternum stable  Tubes/drain sites: dressing clean and dry         Data:    Imaging:  reviewed recent imaging, no acute concerns  CXR 9/20/2022: Impression:   1. Increasing left pleural effusion and associated  atelectasis/consolidation.  2. Question minimal right basilar airspace opacities suggesting  Atelectasis.    Limited Echo 9/21/2022: pending       Labs:  BMP  Recent Labs   Lab 09/20/22 2251 09/20/22  1533 09/19/22  0517 09/18/22  0602   * 135* 133* 136   POTASSIUM 5.1 3.9 4.4 4.2   CHLORIDE 96* 97* 99 103   ALYCE 8.6* 8.9 8.3* 8.3*   CO2 25 23 29 25   BUN 13.6 14.0 14.5 14.9   CR 0.63 0.80 0.69 0.61   * 94 110* 106*     CBC  Recent Labs   Lab 09/20/22  2251 09/20/22  1533 09/19/22  0517 09/18/22  0602   WBC 9.8 9.1 8.2 8.8   RBC 3.05* 2.94* 2.62* 2.67*   HGB 9.1* 8.8* 7.8* 7.9*   HCT 27.9* 27.3* 24.1* 24.2*   MCV 92 93 92 91   MCH 29.8 29.9 29.8 29.6   MCHC 32.6 32.2 32.4 32.6   RDW 15.0 14.8 14.6 14.6    340 263 239     INR  Recent Labs   Lab 09/20/22  1533 09/14/22 2026 " 09/14/22  1540 09/14/22  1400   INR 1.04 1.30* 1.41* 1.48*      Hepatic Panel  Recent Labs   Lab 09/20/22  2251 09/20/22  1533 09/17/22  0609 09/16/22  0455   AST 65* 38* 56* 72*   ALT 33 41* 21 19   ALKPHOS 64 67 51 51   BILITOTAL 0.3 0.3 0.3 0.3   ALBUMIN 3.4* 3.6 3.0* 3.2*     GLUCOSE:   Recent Labs   Lab 09/20/22  2251 09/20/22  1533 09/19/22  0517 09/18/22  0602 09/17/22  0609 09/16/22  2340   * 94 110* 106* 106* 123*

## 2022-09-21 NOTE — DISCHARGE SUMMARY
"  [facility]   Cardiology I Service / General Cardiology  Discharge Summary     Luis Alberto Garcia MRN# [id number]   YOB: 1950 Age: 72 year old     DATE OF ADMISSION:  9/20/2022  DATE OF DISCHARGE:  9/21/2022  ADMITTING PROVIDER: Eliu Zimmerman MD  DISCHARGE PROVIDER: Myla Roberts FNP-C  PRIMARY PROVIDER:   Shiv Gomez    ADMIT DIAGNOSES:   1. AFib with RVR   2. Left Pleural effusion     DISCHARGE DIAGNOSES:   1. CAD s/p CABG  2. Left Pleural Effusion  3. HTN  4. Mild to moderate AR  5. GERD  6. Postoperative antimicrobial prophylaxis       HPI: Please see the detailed H & P by Dr. Zimmerman from 9/20/2022. Luis Alberto Garcia is a 72 year old female who presents to the ED for afib w/ RVR.     Pt discharged 9/19 POD#6 for 5v CABG. Awoke 9/20 with acute palpitations, dyspnea at rest+exertion, radiating neck pain, and presyncope. At CVTS clinic, EKG illustrated afib w. RVR in the 140s and sent to ED. No prior history of subjective or document arrythmia. Commented that she was febrile post-op, and endorsing feeling flushed. Has significant pleuritic chest pain on inspiration, not affected by position, which is well treated with oxycodone. Has been using incentive spirometery as prescribed. NO past history of PNA. 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 No issues with post-op infections following knee replacements. No alcohol, recreational drug use, or new med since discharge. No known history of MARKO.  NO known history of MI. 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     PHYSICAL EXAM:  Blood pressure 116/61, pulse 86, temperature 98.6  F (37  C), temperature source Oral, resp. rate 18, height 1.676 m (5' 6\"), weight 85.3 kg (188 lb), SpO2 97 %, not currently breastfeeding.  GENERAL: Appears alert and oriented times three.   HEENT: Eye symmetrical and free of discharge bilaterally. Mucous membranes moist and without lesions.  NECK: Supple and without lymphadenopathy. JVD below " clavicular line upright  CV: RRR, S1S2 present with Grade III/VI murmur heard best at RSB  RESPIRATORY: Respirations regular, even, and unlabored. Mildly decreased left base.   GI: Soft and non distended with normoactive bowel sounds present in all quadrants. No tenderness, rebound, guarding. No organomegaly.   EXTREMITIES: No peripheral edema. 2+ bilateral pedal pulses.   NEUROLOGIC: Alert and orientated x 3. CN II-XII grossly intact. No focal deficits.   MUSCULOSKELETAL: No joint swelling or tenderness.   SKIN: No jaundice. No rashes or lesions.     LABS:   Last CBC:   Recent Labs   Lab Test 09/21/22 0722   WBC 10.2   RBC 2.84*   HGB 8.5*   HCT 26.4*   MCV 93   MCH 29.9   MCHC 32.2   RDW 15.0          Last CMP:  Recent Labs   Lab Test 09/21/22 0722      POTASSIUM 3.9   CHLORIDE 101   ALYCE 8.7*   CO2 24   BUN 13.4   CR 0.61   *   AST 30   ALT 30   BILITOTAL 0.3   ALBUMIN 3.2*   PROTTOTAL 5.9*   ALKPHOS 60       IMAGING:  Results for orders placed or performed during the hospital encounter of 09/20/22   XR Chest Port 1 View    Narrative    Exam: XR CHEST PORT 1 VIEW, 9/20/2022 4:52 PM    Indication: palpitations    Comparison: 19 2022    Findings:   Median sternotomy wires and mediastinal surgical clips. Heart borders  are partially obscured. Increasing left pleural effusion and  associated atelectasis/consolidation. No effusion on the right. No  appreciable pneumothorax. Question minimal right basilar airspace  opacities.      Impression    Impression:   1. Increasing left pleural effusion and associated  atelectasis/consolidation.  2. Question minimal right basilar airspace opacities suggesting  atelectasis.    I have personally reviewed the examination and initial interpretation  and I agree with the findings.    TISHA ROMO, DO         SYSTEM ID:  [id number]   Echo Limited     Value    LVEF  60-65%    Narrative    [id number]  [id number]  [id number]  [id number]^KYRA^JOSE^GEN     [facility]  [facility]  Echocardiography Laboratory  [address] [place] [address]     Name: SOCORRO POMPA  MRN: [id number]  : 1950  Study Date: 2022 10:31 AM  Age: 72 yrs  Gender: Female  Patient Location: [facility]  Reason For Study: Atrial Flutter  Ordering Physician: JOSE RAE  Performed By: Renny Naranjo     BSA: 1.9 m2  Height: 65.5 in  Weight: 187 lb  BP: 126/61 mmHg  ______________________________________________________________________________  Procedure  Limited Portable Echo Adult.  ______________________________________________________________________________  Interpretation Summary  Global and regional left ventricular function is normal with an EF of 60-65%.  Global right ventricular function is normal.  Mild to moderate AS and AI.  IVC diameter <2.1 cm collapsing >50% with sniff suggests a normal RA pressure  of 3 mmHg.  No pericardial effusion is present.  No significant changes noted.  ______________________________________________________________________________  Left Ventricle  Global and regional left ventricular function is normal with an EF of 60-65%.  Left ventricular size is normal. Left ventricular wall thickness is normal.     Right Ventricle  The right ventricle is normal size. Global right ventricular function is  normal.     Mitral Valve  The mitral valve is normal. Trace mitral insufficiency is present.     Aortic Valve  Moderate aortic valve calcification is present. Mild to moderate aortic  insufficiency is present. The peak aortic velocity is 3 m/sec. AS is present,  mild-moderate on recent study, not fully interrogated on this study.     Tricuspid Valve  Mild tricuspid insufficiency is present. The right ventricular systolic  pressure is approximated at 23.0 mmHg plus the right atrial pressure.     Pulmonic Valve  The pulmonic valve is normal.     Vessels  The aorta root is normal. IVC diameter <2.1 cm collapsing >50% with sniff  suggests a  normal RA pressure of 3 mmHg.     Pericardium  No pericardial effusion is present.     Compared to Previous Study  No significant changes noted.     ______________________________________________________________________________  MMode/2D Measurements & Calculations  IVSd: 1.1 cm  LVIDd: 3.6 cm  LVIDs: 2.7 cm  LVPWd: 0.83 cm     FS: 26.3 %  LV mass(C)d: 99.4 grams  LV mass(C)dI: 51.2 grams/m2  Ao root diam: 2.5 cm  LA dimension: 3.8 cm  LA/Ao: 1.5  RWT: 0.46     Doppler Measurements & Calculations  Ao V2 max: 304.0 cm/sec  Ao max P.0 mmHg  PA V2 max: 148.0 cm/sec  PA max P.1 mmHg  TR max pool: 240.0 cm/sec  TR max P.0 mmHg     ______________________________________________________________________________  Report approved by: Guillermo Campa 2022 11:16 AM             CONSULTATIONS:   1. CVTS  2. Medicine PACS team     HOSPITAL COURSE:   1. AFib with RVR. Luis Alberto presented with Afib with RVR. Concern for infectious etiology. WBC WNL, Blood Cx NTD, sputum cultures negative, and COVID negative. Chest X-ray concerning for left effusion, stable on room air. She was placed on IV Amio and heparin infusion. She spontaneously converted overnight. Given duration less than 48 hours and symptomatic onset long term anticoagulation deferred in setting of likely postoperative Afib s/p CABG. Appreciate CVTS consult. Echo negative for pericardial effusion with no acute changes and EF 60-65%. She will discharge to home with Diltiazem 60 mg q6h prn in setting of HR > 110. She was notified to contact CVTS and Cardiogy if this recurs. Biotel monitor for 7 days at discharge. She will follow up in 1-2 weeks in Cardiology clinic.     2. Left Pleural effusion. Noted on Chest X-ray on admission, stable on room air. Appreciate IM PACs consult. Evidence for approximately 300-400 ml on bedside echo. Pt deferred thoracentesis at this time. She will follow up with CVTS as scheduled 22 for further evaluation.     3. CAD  s/p CABG. Postoperative antimicrobial prophylaxis. Appreciate CVTS consult. She will continue Bactrim at discharge to complete a 5 day course given prior lancing of sternal incision for prophylaxis. No acute evidence for infection. Follow up with CVTS as scheduled.     DISCHARGE MEDICATIONS:  Current Discharge Medication List      START taking these medications    Details   diltiazem (CARDIZEM) 60 MG tablet Take 1 tablet (60 mg) by mouth 4 times daily as needed (Hr > 110)  Qty: 6 tablet, Refills: 0    Associated Diagnoses: Atrial fibrillation, unspecified type (H)         CONTINUE these medications which have CHANGED    Details   sulfamethoxazole-trimethoprim (BACTRIM) 400-80 MG tablet Take 1 tablet by mouth 2 times daily  Qty: 14 tablet, Refills: 0    Associated Diagnoses: S/P CABG x 5         CONTINUE these medications which have NOT CHANGED    Details   acyclovir (ZOVIRAX) 400 MG tablet Take 400 mg by mouth daily as needed      albuterol (PROAIR HFA/PROVENTIL HFA/VENTOLIN HFA) 108 (90 Base) MCG/ACT inhaler Inhale 2 puffs into the lungs every 6 hours      aspirin (ASA) 81 MG chewable tablet Take 2 tablets (162 mg) by mouth daily  Qty: 60 tablet, Refills: 3    Associated Diagnoses: S/P CABG x 5      Ergocalciferol 50 MCG (2000 UT) TABS 50 mcg every morning      fish oil-omega-3 fatty acids 1000 MG capsule Take 2 g by mouth every morning      furosemide (LASIX) 40 MG tablet Take 1 tablet (40 mg) by mouth daily for 7 days  Qty: 7 tablet, Refills: 0    Associated Diagnoses: S/P CABG x 5      gabapentin (NEURONTIN) 100 MG capsule 200 mg At Bedtime      hydrochlorothiazide (HYDRODIURIL) 50 MG tablet Take 0.5 tablets (25 mg) by mouth every morning  Qty: 30 tablet, Refills: 3    Associated Diagnoses: S/P CABG x 5      magnesium 100 MG CAPS 200 mg At Bedtime      methocarbamol (ROBAXIN) 500 MG tablet Take 2 tablets (1,000 mg) by mouth every 6 hours as needed for muscle spasms or other (pain)  Qty: 100 tablet, Refills: 0     Associated Diagnoses: S/P CABG x 5      metoprolol succinate ER (TOPROL XL) 25 MG 24 hr tablet Take 25 mg by mouth every morning      mupirocin (BACTROBAN) 2 % external ointment as needed      nitroGLYcerin (NITROSTAT) 0.4 MG sublingual tablet 0.4 mg every 5 minutes as needed      omeprazole (PRILOSEC) 20 MG DR capsule 20 mg At Bedtime      oxyCODONE (ROXICODONE) 5 MG tablet Take 1-2 tablets (5-10 mg) by mouth every 4 hours as needed for moderate to severe pain  Qty: 25 tablet, Refills: 0    Associated Diagnoses: S/P CABG x 5      pantoprazole (PROTONIX) 40 MG EC tablet Take 1 tablet (40 mg) by mouth daily for 7 days  Qty: 7 tablet, Refills: 0    Associated Diagnoses: S/P CABG x 5      PARoxetine (PAXIL) 20 MG tablet Take 20 mg by mouth every morning      polyethylene glycol (MIRALAX) 17 GM/Dose powder Take 17 g by mouth daily as needed for constipation  Qty: 510 g, Refills: 0    Associated Diagnoses: S/P CABG x 5      pravastatin (PRAVACHOL) 10 MG tablet Take 10 mg by mouth every morning      senna-docusate (SENOKOT-S/PERICOLACE) 8.6-50 MG tablet 1 tablet by Oral or Feeding Tube route 2 times daily as needed for constipation  Qty: 60 tablet, Refills: 0    Associated Diagnoses: S/P CABG x 5      sodium fluoride dental gel (PREVIDENT) 1.1 % GEL topical gel Brush 2x/day.  Do not eat or drink for 30 minutes after.      zolpidem (AMBIEN) 5 MG tablet Take 0.5 tablets (2.5 mg) by mouth nightly as needed for sleep  Qty: 10 tablet, Refills: 0    Associated Diagnoses: S/P CABG x 5             DISCHARGE DISPOSITION: Luis Alberto Gacria will discharge to home in stable condition.     DISCHARGE INSTRUCTIONS:  Discharge Procedure Orders   Reason for your hospital stay   Order Comments: You were admitted to the hospital for a fast heart rate. Please notify your surgery team for recurrent fast heart rate, increased shortness of breath, worsening cough, or weight gain of 3 lbs overnight or 5 lbs in a week.     Activity   Order  Comments: Your activity upon discharge: activity as tolerated     Order Specific Question Answer Comments   Is discharge order? Yes      Adult [facility] Follow-up and recommended labs and tests   Order Comments: Follow up with surgery SEVEN as scheduled 9/30.   Follow up with Dr. Anastacia Trujillo in 1-2 weeks.     Appointments on [place] and/or [facility] (with [facility] or [facility] provider or service). Call [phone] if you haven't heard regarding these appointments within 7 days of discharge.     Full Code     Order Specific Question Answer Comments   Code status determined by: Discussion with patient/ legal decision maker      Adult Cardiac Event Monitor   Standing Status: Future Number of Occurrences: 1 Standing Exp. Date: 09/21/23   Order Comments: Biotel for 7 days     Order Specific Question Answer Comments   Patient should wear the monitor for? 7 days    Order completed for? Adult Cardiology    Schedule hook-up appt at [facility] [12]      Regular Diet Adult     Order Specific Question Answer Comments   Is discharge order? Yes        30 minutes spent in discharge, including >50% in counseling and coordination of care, medication review and plan of care recommended on follow up. Questions were answered. Please do not hesitate to contact me should there be questions regarding the hospital course or discharge plan.      MARCOS Figueroa CNP FNP-C  9/21/2022  [phone]

## 2022-09-21 NOTE — H&P
[facility]    Cardiology History and Physical - Cardiology         Date of Admission:  9/20/2022    Assessment & Plan: HVSL    Luis Alberto Garcia is a 72 year old female admitted on 9/20/2022. She has a PMH significant for recent 5v CABG 9/14/2022 d/2 severe CAD, mild moderate aortic regurg, HTN, GERD, NAFLD, migraine disorder, mixed HLD, OA s/p b/l knee replacements, and history of MRSA skin infections who is admitted for symptomatic afib w/ RVR     #New Atrial Fibrillation w/ Rapid Ventricular Response   #CHADSVASC  #5v CABG 9/14/2022, POD #7   Pt presented with acute palpitations, worsened dyspnea, pre-syncope w/o LOC, and dizziness found to be in afib w/ RVR up to the 140s when seen at CVTS clinic. Denies known past history of arrhythmia or subjective palpitations. TSH wnl, denies alcohol or recreational drug use. S/p 2 doses of bactrim PO for possible sternal incision infection in the setting of past MRSA folliculitis. Worsened cough following acute palpitations, white sputum production. Afebrile on admit, with no leukocytosis. NO reported history of MARKO. Is experiencing some pleuritic chest pain exacerbated with deep breathing. Has been using IS as prescribed. S/p amio bolus and on amio gtt with improved HR, but remains in afib. Unclear underlying trigger for afib may be related to recent CT surgery, vs progressive PNA. PT was reportedly volume up on discharge 9/20 which may be contirbuting, and has been diuresing on furosemide 40mg - no elevated JVD or pitting LE, but pt reports increase pedal swelling from baseline.CXR notable for increased L pleural effusion consistent with atelectasis OR consolidation. Sternal incision without acute purulence or fluctuance. Not experiencing chest pain, but some radiating neck pain which was per prior angina presentation - vessel graft dysfunction can be considered. HDS.    -amiodarone gtt  -tele  -heparin gtt   >if d/t  "acute trigger, may not require long term AC  -cont furosemide 40mg every day   -Cardiac diet  -lactate  -BCx   -Sputum culture  -UA   -hold PO bactrim  -Start IV cefepime 2g q8 for VAP/CAP coverage  -Start PO doxycyline 100mg BID for VAP/CAP + SSTI MRSA coverage    >low suspicion for infection at this time, deescalate based on infx workup   -MRSA nare   >will escalate to add vanco if nares pos  -PTA oxycodone 5-10mg Q4hr PRN for post-op pain  -AM CMP  -AM CBC   -wound cares per CVTS   -NPO at midnight for any procedures   -CVTS consult     Chronic:   HTN - HOLD PTA metoprolol succinate, HOLD PTA hydrochlorothiazide   Sleep - Ambien 5mg PO at bedtime PRN      Diet:  Cardiac diet   DVT Prophylaxis: heparin gtt  Gray Catheter: Not present  Code Status:   Full code   Fluids: N/A   Lines: PIV       Disposition Plan   Expected discharge: 2 - 3 days, recommended to prior living arrangement once arrhythmia stabilized .    Entered: Ashutosh Denton MD 09/21/2022, 1:01 AM     To be staffed in the AM     Ashutosh Denton MD  [facility]    ______________________________________________________________________    Chief Complaint   \"My heart was racing\"     History is obtained from the patient and electronic health record    History of Present Illness   Luis Alberto Garcia is a 72 year old female who presents to the ED for afib w/ RVR.    Pt discharged 9/19 POD#6 for 5v CABG. Awoke 9/20 with acute palpitations, dyspnea at rest+exertion, radiating neck pain, and presyncope. At CVTS clinic, EKG illustrated afib w. RVR in the 140s and sent to ED. No prior history of subjective or document arrythmia. Commented that she was febrile post-op, and endorsing feeling flushed. Has significant pleuritic chest pain on inspiration, not affected by position, which is well treated with oxycodone. Has been using incentive spirometery as prescribed. NO past history of PNA. 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 No issues with post-op infections following knee replacements. No alcohol, recreational drug use, or new med since discharge. No known history of MARKO.  NO known history of MI. 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     Review of Systems    The 10 point Review of Systems is negative other than noted in the HPI or here.     Past Medical History    I have reviewed this patient's medical history and updated it with pertinent information if needed.   Past Medical History:   Diagnosis Date    Anxiety     Centrilobular emphysema (H)     Childhood asthma     Coronary artery disease involving native coronary artery of native heart     Esophageal reflux     Essential hypertension     Fatty liver disease     Hair loss     Migraine     Mild aortic stenosis     Mixed hyperlipidemia     MRSA infection     Skin abscesses after COVID vaccinations    Osteoarthritis     Primary insomnia        Past Surgical History   I have reviewed this patient's surgical history and updated it with pertinent information if needed.  Past Surgical History:   Procedure Laterality Date    BYPASS GRAFT ARTERY CORONARY N/A 9/14/2022    Procedure: MEDIAN STERNOTOMY, CORONARY ARTERY BYPASS GRAFT (CABG) X  5 USING THE LEFT INTERNAL MAMMARY AND ENDOSCOPICALLY HARVESTED RIGHT AND LEFT SAPHENOUS VEIN, ON CARDIOPULMONARY BYPASS, INTRAOPERATIVE TRANSESOPHAGEAL ECHOCARDIOGRAM BY ANESTHESIA.;  Surgeon: Galo Pathak MD;  Location: [facility] OR    CATARACT IOL, RT/LT      IR LUMBAR EPIDURAL STEROID INJECTION  10/06/2005    IR LUMBAR EPIDURAL STEROID INJECTION  11/09/2005    Left fibular fracture ORIF  2013    REPLACEMENT TOTAL KNEE Left 2020    REPLACEMENT TOTAL KNEE Right 11/2021    ROTATOR CUFF REPAIR RT/LT Right 2017       Social History   I have reviewed this patient's social history and updated it with pertinent information if needed.  Social History     Tobacco Use    Smoking status: Former Smoker     Packs/day: 1.00      Years: 25.00     Pack years: 25.00     Types: Cigarettes     Quit date: 1992     Years since quittin.6    Smokeless tobacco: Never Used   Substance Use Topics    Alcohol use: Yes     Comment: socially     Family History   I have reviewed this patient's family history and updated it with pertinent information if needed.   I have reviewed this patient's family history and updated it with pertinent information if needed.  Family History   Problem Relation Age of Onset    Lung Cancer Mother     LUNG DISEASE Father     Hypothyroidism Sister     Hypothyroidism Sister     Osteosarcoma Sister     Anesthesia Reaction No family hx of        Prior to Admission Medications   Prior to Admission Medications   Prescriptions Last Dose Informant Patient Reported? Taking?   Ergocalciferol 50 MCG ( UT) TABS   Yes No   Si mcg every morning   PARoxetine (PAXIL) 20 MG tablet   Yes No   Sig: Take 20 mg by mouth every morning   acyclovir (ZOVIRAX) 400 MG tablet   Yes No   Sig: Take 400 mg by mouth daily as needed   albuterol (PROAIR HFA/PROVENTIL HFA/VENTOLIN HFA) 108 (90 Base) MCG/ACT inhaler   Yes No   Sig: Inhale 2 puffs into the lungs every 6 hours   aspirin (ASA) 81 MG chewable tablet   No No   Sig: Take 2 tablets (162 mg) by mouth daily   fish oil-omega-3 fatty acids 1000 MG capsule   Yes No   Sig: Take 2 g by mouth every morning   furosemide (LASIX) 40 MG tablet   No No   Sig: Take 1 tablet (40 mg) by mouth daily for 7 days   gabapentin (NEURONTIN) 100 MG capsule   Yes No   Si mg At Bedtime   hydrochlorothiazide (HYDRODIURIL) 50 MG tablet   No No   Sig: Take 0.5 tablets (25 mg) by mouth every morning   magnesium 100 MG CAPS   Yes No   Si mg At Bedtime   methocarbamol (ROBAXIN) 500 MG tablet   No No   Sig: Take 2 tablets (1,000 mg) by mouth every 6 hours as needed for muscle spasms or other (pain)   metoprolol succinate ER (TOPROL XL) 25 MG 24 hr tablet   Yes No   Sig: Take 25 mg by mouth every  morning   mupirocin (BACTROBAN) 2 % external ointment   Yes No   Sig: as needed   nitroGLYcerin (NITROSTAT) 0.4 MG sublingual tablet   Yes No   Si.4 mg every 5 minutes as needed   omeprazole (PRILOSEC) 20 MG DR capsule   Yes No   Si mg At Bedtime   oxyCODONE (ROXICODONE) 5 MG tablet   No No   Sig: Take 1-2 tablets (5-10 mg) by mouth every 4 hours as needed for moderate to severe pain   pantoprazole (PROTONIX) 40 MG EC tablet   No No   Sig: Take 1 tablet (40 mg) by mouth daily for 7 days   Patient not taking: Reported on 2022   polyethylene glycol (MIRALAX) 17 GM/Dose powder   No No   Sig: Take 17 g by mouth daily as needed for constipation   Patient not taking: Reported on 2022   pravastatin (PRAVACHOL) 10 MG tablet   Yes No   Sig: Take 10 mg by mouth every morning   Patient not taking: Reported on 2022   senna-docusate (SENOKOT-S/PERICOLACE) 8.6-50 MG tablet   No No   Si tablet by Oral or Feeding Tube route 2 times daily as needed for constipation   sodium fluoride dental gel (PREVIDENT) 1.1 % GEL topical gel   Yes No   Sig: Brush 2x/day.  Do not eat or drink for 30 minutes after.   sulfamethoxazole-trimethoprim (BACTRIM) 400-80 MG tablet   No No   Sig: Take 1 tablet by mouth 2 times daily for 7 days   zolpidem (AMBIEN) 5 MG tablet   No No   Sig: Take 0.5 tablets (2.5 mg) by mouth nightly as needed for sleep      Facility-Administered Medications: None     Allergies   Allergies   Allergen Reactions    Atorvastatin     Kiwi     Latex     Other Drug Allergy (See Comments)      X ray dye and shingrex vaccine        Physical Exam   Vital Signs: Temp: 98.6  F (37  C) Temp src: Oral BP: 118/63 Pulse: (!) 128   Resp: 18 SpO2: 92 % O2 Device: None (Room air)    Weight: 188 lbs 0 oz    Constitutional: awake, alert, cooperative, no apparent distress, and appears stated age  Eyes: pupils equal, round and reactive to light, sclera clear and conjunctiva normal  ENT: normocepalic, without obvious  abnormality, atramatic  Respiratory: No increased work of breathing, good air exchange, clear to auscultation bilaterally, no crackles or wheezing.   Cardiovascular: irregularly irregular rhythm  GI: No scars, normal bowel sounds, soft, non-distended, non-tender, no masses palpated, no hepatosplenomegally    Data   Data reviewed today: I reviewed all medications, new labs and imaging results over the last 24 hours. I personally reviewed,.    Recent Labs   Lab 09/20/22  2251 09/20/22  1533 09/19/22  0517 09/14/22  2332 09/14/22 2026 09/14/22  1542 09/14/22  1540   WBC 9.8 9.1 8.2   < > 15.6*  --  16.2*   HGB 9.1* 8.8* 7.8*   < > 9.6*  --  10.3*   MCV 92 93 92   < > 91  --  90    340 263   < > 171  --  163   INR  --  1.04  --   --  1.30*  --  1.41*   * 135* 133*   < > 141  --  139   POTASSIUM 5.1 3.9 4.4   < > 3.8  --  4.5   CHLORIDE 96* 97* 99   < > 112*  --  107   CO2 25 23 29   < > 20*  --  26   BUN 13.6 14.0 14.5   < > 15.4  --  17.6   CR 0.63 0.80 0.69   < > 0.55  --  0.68   ANIONGAP 9 15 5*   < > 9  --  6*   ALYCE 8.6* 8.9 8.3*   < > 6.9*  --  9.4   * 94 110*   < > 185*   < > 142*   ALBUMIN 3.4* 3.6  --    < > 2.5*  --   --    PROTTOTAL 6.5 6.3*  --    < > 3.9*  --   --    BILITOTAL 0.3 0.3  --    < > 0.5  --   --    ALKPHOS 64 67  --    < > 45  --   --    ALT 33 41*  --    < > 13  --   --    AST 65* 38*  --    < > 36*  --   --     < > = values in this interval not displayed.     Recent Results (from the past 24 hour(s))   XR Chest Port 1 View    Narrative    Exam: XR CHEST PORT 1 VIEW, 9/20/2022 4:52 PM    Indication: palpitations    Comparison: 19 2022    Findings:   Median sternotomy wires and mediastinal surgical clips. Heart borders  are partially obscured. Increasing left pleural effusion and  associated atelectasis/consolidation. No effusion on the right. No  appreciable pneumothorax. Question minimal right basilar airspace  opacities.      Impression    Impression:   1. Increasing left  pleural effusion and associated  atelectasis/consolidation.  2. Question minimal right basilar airspace opacities suggesting  atelectasis.    I have personally reviewed the examination and initial interpretation  and I agree with the findings.    TISHA ROMO DO         SYSTEM ID:  [id number]     Attending Attestation: I saw, examined and evaluated the patient and reviewed all relevant data. I agree with the findings, and plan of care documented in the edited note and summarized the amaya findings and plan with the patient.  - patient cardioverted to sinus rhythm (PRN diltiazem for recurrence)  - based on exam small pleural effusion (US pending) if minor discharge home

## 2022-09-21 NOTE — UTILIZATION REVIEW
"  [facility] Utilization Review  Admission Status; Secondary Review Determination     Admission Date: 9/20/2022  3:08 PM      Under the authority of the Utilization Management Committee, the utilization review process indicated a secondary review on the above patient.  The review outcome is based on review of the medical records, discussions with staff, and applying clinical experience noted on the date of the review.        (X) Observation Status Appropriate - This patient does not meet hospital inpatient criteria and is placed in observation status. If this patient's primary payer is Medicare and was admitted as an inpatient, Condition Code 44 should be used and patient status changed to \"observation\".   () Observation Status concurrent Review           RATIONALE FOR DETERMINATION   72-year-old female with history of atrial fibrillation, coronary artery disease status post CABG, moderate aortic insufficiency and mild aortic stenosis, hypertension, admitted with atrial fibrillation with RVR in clinic, with symptoms of palpitations and lightheadedness.  Patient initially started on IV amiodarone with transition to oral taper dosing, discontinued IV heparin, IV cefepime, doxycycline for pneumonia, patient converted back to normal sinus rhythm around midnight.  Echocardiogram shows EF of 60 to 65%, patient has now improved and is ready to discharge today, does not meet criteria for inpatient stay, recommend change to observation status, communicated to Myla Roberts NP        The severity of illness, intensity of service provided, expected LOS make the care appropriate for observation status at this time.        The information on this document is developed by the utilization review team in order for the business office to ensure compliance.  This only denotes the appropriateness of proper admission status and does not reflect the quality of care rendered.         The definitions of Inpatient Status and " Observation Status used in making the determination above are those provided in the CMS Coverage Manual, Chapter 1 and Chapter 6, section 70.4.      Sincerely,       Ignacio Larosn MD  Physician Advisor  Utilization Review-[place]    Phone: [phone]

## 2022-09-21 NOTE — CONSULTS
[facility]  CAPS NOTE  Date of Admission:  9/20/2022  Consult Requested by: Cardiology  Reason for Consult: diagnostic evaluation of pleural effusion    No results found for this or any previous visit from the past 1 day.      History of Present Illness:   Patient endorses pleuritic chest pain, nausea vomiting.     Review of Systems:  On RA, no dyspnea.     Physical Exam:  Vital Signs:     BP: 121/63 Pulse: 84   Resp: 18 SpO2: 95 % O2 Device: None (Room air)    Weight: 188 lbs 0 oz  General Appearance: In some distress from nausea  Able to sit up and lean fwd for POCUS.    Assessment and Plan:  Requested procedure was not performed.  Small to moderate amount ascites but patient refused.     Aurora Orlando MD  [facility]  Securely message with the Vocera Web Console (learn more here)  Text page via [facility] Paging/Directory   Please see signed in provider for up to date coverage information        Limited point of care pleural/lung ultrasound to evaluate for thoracentesis.    Thoracentesis Indication:pleural effusion     Views/Acquisition: Bilateral pleural space(s): upright.      Findings/Interpretation: left pleural effusion, small to moderate. Patient declines thoracentesis at this time.

## 2022-09-21 NOTE — PLAN OF CARE
"Assumed cares at [id number]     Status: Palpitations, Afib  Neuro:  AOX4  GI/: WNL   Resp: Dyspnea on exertion: at 0645 started O2, at 2L 84%-90% at RA, SOB, (-) wheezing, albuterol given.  Mobility: SBA  Cardiac: Afib 's  Lines/Drains: PIV line at lt upper and lt lower arm, Hep Drip and Amiodarone drip    VS: /61   Pulse 86   Temp 98.6  F (37  C) (Oral)   Resp 18   Ht 1.676 m (5' 6\")   Wt 85.3 kg (188 lb)   SpO2 (!) 87%   BMI 30.34 kg/m        Plan of Care:  -Amiodarone  Drip X 18 hrs   -Heparin drip: 0.34 Unfractionated Xa, to recheck at 1100  -Cont on tele    "

## 2022-09-22 ENCOUNTER — PATIENT OUTREACH (OUTPATIENT)
Dept: CARE COORDINATION | Facility: CLINIC | Age: 72
End: 2022-09-22

## 2022-09-22 ENCOUNTER — NURSE TRIAGE (OUTPATIENT)
Dept: CARDIOLOGY | Facility: CLINIC | Age: 72
End: 2022-09-22

## 2022-09-22 DIAGNOSIS — Z95.1 S/P CABG X 5: ICD-10-CM

## 2022-09-22 DIAGNOSIS — I48.0 PAROXYSMAL ATRIAL FIBRILLATION (H): ICD-10-CM

## 2022-09-22 DIAGNOSIS — Z95.1 S/P CABG (CORONARY ARTERY BYPASS GRAFT): Primary | ICD-10-CM

## 2022-09-22 RX ORDER — AMIODARONE HYDROCHLORIDE 400 MG/1
400 TABLET ORAL 2 TIMES DAILY
Qty: 14 TABLET | Refills: 0 | Status: SHIPPED | OUTPATIENT
Start: 2022-09-22 | End: 2022-09-29

## 2022-09-22 RX ORDER — AMIODARONE HYDROCHLORIDE 200 MG/1
200 TABLET ORAL DAILY
Qty: 30 TABLET | Refills: 0 | Status: SHIPPED | OUTPATIENT
Start: 2022-09-29 | End: 2022-10-29

## 2022-09-22 RX ORDER — ASPIRIN 81 MG/1
81 TABLET, CHEWABLE ORAL DAILY
Qty: 60 TABLET | Refills: 3 | Status: SHIPPED | OUTPATIENT
Start: 2022-09-22

## 2022-09-22 NOTE — PROGRESS NOTES
CVTS Brief Note    Luis Alberto called our care coordinator with c/o shortness of breath, palpitations with heart rate up to 130s. She also has intermittent dizziness but with further questioning, this is positional and subsides with a moment's rest. Her blood pressure at home has been stable, ~110 systolic. She does have a noted left pleural effusion on CXR that has been increasing. It was ultrasounded while she was in the ED yesterday and it was recommend she undergo a left thoracentesis but she declined.    Plan:    Outpatient left thoracentesis, (pt agreeable), our care coordinator is arranging for today or tomorrow and will follow up with Luis Alberto with further details.    Aggressive pulm hygiene with IS at home. She verbalizes understanding.    Start Amiodarone 400 mg BID x7 days followed by 200 mg daily x additional 30 days (she was discharged with PRN diltiazem order).    Given recurrent episodes of atrial fibrillation, will start Eliquis 5 mg BID for 37 days while on Amiodarone. She is to start this the day after her thoracentesis. Decrease ASA to 81 mg daily while on Eliquis.    Follow up with Cardiology within the next month, our care coordinator will help facilitate this.    Follow up with CVTS as scheduled on 9/30.    MARCOS Stevens, ACNPC-AG, CCRN  Nurse Practitioner  Cardiothoracic Surgery  Pager: [phone]

## 2022-09-22 NOTE — PROGRESS NOTES
[facility]    Background: Transitional Care Management program auto-identified and prompting a chart review by [facility] team.    Assessment: Upon chart review, [facility] Team member will cancel/close this episode of Transitional Care Management program due to reason below:    Patient has had outreach phone to call to appropriate cardiology staff for concerns about hospital follow up    Plan: Transitional Care Management episode closed per reason above.      Hailey Cooper RN  [facility], [facility]    *Connected Care Resource Team does NOT follow patient ongoing. Referrals are identified based on internal discharge reports and the outreach is to ensure patient has an understanding of their discharge instructions.

## 2022-09-22 NOTE — TELEPHONE ENCOUNTER
"Patient called with concern of symptoms since discharge from hospital yesterday. She was hospitalized for AF and tachycardia. Stabilized with amiodarone drip and was discharged. She says her HB is still not regular, is elevated and has palpitations. Her HR goes from the 70s up to 130s. She was prescribed diltiazem to take if this happens but she has not gotten the script yet. She has SOB with activity and will have coughing fits at rest. She had gotten a heart monitor however she says it is not working. Her O2 sat is normal today. From her discharge note with Thoracic Surgery she was told to notify if still having symptoms and may need Thoracentesis. Patient states she did not get an abx for her lungs and questions if she needs this. Will route to [facility] Thoracic Surgery to follow-up with patient.     1. RESPIRATORY STATUS: \"Describe your breathing?\" (e.g., wheezing, shortness of breath, unable to speak, severe coughing) SOB, coughing fits at rest.   2. ONSET: \"When did this breathing problem begin?\" Since hospital discharge yesterday.  3. PATTERN \"Does the difficult breathing come and go, or has it been constant since it started?\" With activity.   4. SEVERITY: \"How bad is your breathing?\" (e.g., mild, moderate, severe) Moderate.  - MILD: No SOB at rest, mild SOB with walking, speaks normally in sentences, can lie down, no retractions, pulse < 100.   - MODERATE: SOB at rest, SOB with minimal exertion and prefers to sit, cannot lie down flat, speaks in phrases, mild retractions, audible wheezing, pulse 100-120.   - SEVERE: Very SOB at rest, speaks in single words, struggling to breathe, sitting hunched forward, retractions, pulse > 120   5. RECURRENT SYMPTOM: \"Have you had difficulty breathing before?\" If Yes, ask: \"When was the last time?\" and \"What happened that time?\" This occurrence.   6. CARDIAC HISTORY: \"Do you have any history of heart disease?\" (e.g., heart attack, angina, bypass surgery, angioplasty) S/P CABG x " "5, A-Fib, CAD.  7. LUNG HISTORY: \"Do you have any history of lung disease?\" (e.g., pulmonary embolus, asthma, emphysema) None.  8. CAUSE: \"What do you think is causing the breathing problem?\" Related to medical issues with hospitalization.  9. OTHER SYMPTOMS: \"Do you have any other symptoms? (e.g., dizziness, runny nose, cough, chest pain, fever) Elevated HR, palpitations.   10. O2 SATURATION MONITOR: \"Do you use an oxygen saturation monitor (pulse oximeter) at home?\" If Yes, \"What is your reading (oxygen level) today?\" \"What is your usual oxygen saturation reading?\" (e.g., 95%) Patient stated it was \"normal.\"      "

## 2022-09-22 NOTE — TELEPHONE ENCOUNTER
Pt calling with report that she was released from the hospital yesterday after having a 5 vessel cardiac bypass surgery 9/14/22.    Says that her HR was irregular  and she was feeling lightheaded, though currently not rapid, and episode of dizziness resolved.     Denies current breathing difficulty or chest pain.    Advised pt to call the Cardiology Clinic Phone # and get connected to the Answering Service who can have the On-Call Cardiologist phone her.  Ask the Specialist if you should go to the ED tonight.  Pt is agreeable with plan because she would like to talk to the MD.    Zandra Washington RN        Reason for Disposition    [1] Heart beating very rapidly (e.g., > 140 / minute) AND [2] not present now  (Exception: during exercise)    Additional Information    Negative: Passed out (i.e., lost consciousness, collapsed and was not responding)    Negative: Shock suspected (e.g., cold/pale/clammy skin, too weak to stand, low BP, rapid pulse)    Negative: Difficult to awaken or acting confused (e.g., disoriented, slurred speech)    Negative: Visible sweat on face or sweat dripping down face    Negative: Unable to walk, or can only walk with assistance (e.g., requires support)    Negative: [1] Received SHOCK from implantable cardiac defibrillator AND [2] persisting symptoms (i.e., palpitations, lightheadedness)    Negative: [1] Dizziness, lightheadedness, or weakness AND [2] heart beating very rapidly (e.g., > 140 / minute)    Negative: [1] Dizziness, lightheadedness, or weakness AND [2] heart beating very slowly (e.g., < 50 / minute)    Negative: Sounds like a life-threatening emergency to the triager    Negative: Chest pain    Negative: Implantable Cardiac Defibrillator (ICD) or a pacemaker symptoms or questions    Negative: New or worsened shortness of breath with activity (dyspnea on exertion)    Negative: Patient sounds very sick or weak to the triager    Negative: Difficulty breathing    Negative:  Dizziness, lightheadedness, or weakness    Negative: [1] Heart beating very rapidly (e.g., > 140 / minute) AND [2] present now  (Exception: during exercise)    Negative: Heart beating very slowly (e.g., < 50 / minute)  (Exception: athlete and heart rate normal for caller)    Protocols used: HEART RATE AND HEARTBEAT [id number]

## 2022-09-23 ENCOUNTER — HOSPITAL ENCOUNTER (OUTPATIENT)
Facility: CLINIC | Age: 72
Discharge: HOME | End: 2022-09-23
Attending: RADIOLOGY | Admitting: PHYSICIAN ASSISTANT
Payer: COMMERCIAL

## 2022-09-23 ENCOUNTER — TELEPHONE (OUTPATIENT)
Dept: CARDIOLOGY | Facility: CLINIC | Age: 72
End: 2022-09-23

## 2022-09-23 ENCOUNTER — HOSPITAL ENCOUNTER (OUTPATIENT)
Dept: INTERVENTIONAL RADIOLOGY/VASCULAR | Facility: CLINIC | Age: 72
Discharge: HOME | End: 2022-09-23
Attending: NURSE PRACTITIONER | Admitting: RADIOLOGY
Payer: COMMERCIAL

## 2022-09-23 VITALS
RESPIRATION RATE: 20 BRPM | SYSTOLIC BLOOD PRESSURE: 118 MMHG | OXYGEN SATURATION: 96 % | DIASTOLIC BLOOD PRESSURE: 84 MMHG | HEART RATE: 89 BPM

## 2022-09-23 DIAGNOSIS — Z95.1 S/P CABG (CORONARY ARTERY BYPASS GRAFT): ICD-10-CM

## 2022-09-23 LAB
APPEARANCE FLD: ABNORMAL
CELL COUNT BODY FLUID SOURCE: ABNORMAL
COLOR FLD: ABNORMAL
GLUCOSE BODY FLUID SOURCE: NORMAL
GLUCOSE FLD-MCNC: 132 MG/DL
LD BODY BODY FLUID SOURCE: NORMAL
LDH FLD L TO P-CCNC: 260 U/L
LYMPHOCYTES NFR FLD MANUAL: 84 %
MONOS+MACROS NFR FLD MANUAL: NORMAL %
NEUTS BAND NFR FLD MANUAL: 1 %
OTHER CELLS FLD MANUAL: 15 %
PROT FLD-MCNC: 3.1 G/DL
PROTEIN BODY FLUID SOURCE: NORMAL
WBC # FLD AUTO: 4194 /UL

## 2022-09-23 PROCEDURE — 272N000502 HC NEEDLE CR3

## 2022-09-23 PROCEDURE — 83615 LACTATE (LD) (LDH) ENZYME: CPT | Performed by: NURSE PRACTITIONER

## 2022-09-23 PROCEDURE — 89051 BODY FLUID CELL COUNT: CPT | Performed by: NURSE PRACTITIONER

## 2022-09-23 PROCEDURE — 32555 ASPIRATE PLEURA W/ IMAGING: CPT | Mod: LT | Performed by: PHYSICIAN ASSISTANT

## 2022-09-23 PROCEDURE — 32555 ASPIRATE PLEURA W/ IMAGING: CPT

## 2022-09-23 PROCEDURE — 87205 SMEAR GRAM STAIN: CPT | Performed by: NURSE PRACTITIONER

## 2022-09-23 PROCEDURE — 250N000009 HC RX 250: Performed by: PHYSICIAN ASSISTANT

## 2022-09-23 PROCEDURE — 84157 ASSAY OF PROTEIN OTHER: CPT | Performed by: NURSE PRACTITIONER

## 2022-09-23 PROCEDURE — 87070 CULTURE OTHR SPECIMN AEROBIC: CPT | Performed by: NURSE PRACTITIONER

## 2022-09-23 PROCEDURE — 82945 GLUCOSE OTHER FLUID: CPT | Performed by: NURSE PRACTITIONER

## 2022-09-23 RX ORDER — LIDOCAINE HYDROCHLORIDE 10 MG/ML
.5-1 INJECTION, SOLUTION EPIDURAL; INFILTRATION; INTRACAUDAL; PERINEURAL ONCE
Status: COMPLETED | OUTPATIENT
Start: 2022-09-23 | End: 2022-09-23

## 2022-09-23 RX ADMIN — LIDOCAINE HYDROCHLORIDE 3 ML: 10 INJECTION, SOLUTION EPIDURAL; INFILTRATION; INTRACAUDAL; PERINEURAL at 08:28

## 2022-09-23 ASSESSMENT — ACTIVITIES OF DAILY LIVING (ADL)
ADLS_ACUITY_SCORE: 35

## 2022-09-23 NOTE — IP AVS SNAPSHOT
After Visit Summary Template Not Found    This Print Group is only intended to be used in the After Visit Summary and can only be used in a report that uses a released After Visit Summary Template.                       MRN:[id number]                          After Visit Summary   9/23/2022    Luis Alberto Garcia   MRN: [id number]           Visit Information        Provider Department      9/23/2022  8:00 AM UR IR RAD; URIR2 [facility] Interventional Radiology           Review of your medicines       Notice    This visit is during an admission. Changes to the med list made in this visit will be reflected in the After Visit Summary of the admission.           Protect others around you: Learn how to safely use, store and throw away your medicines at www.disposemymeds.org.       Follow-ups after your visit       Your next 10 appointments already scheduled    Sep 27, 2022  3:15 PM  (Arrive by 3:00 PM)  Cardiac Evaluation with  Cardiac Rehab [address] [place] [address] Cardiac and Pulmonary Rehabilitation [place] ([facility] ) [address] [place] [address] 1st Floor [address]  [facility] [address] [phone]    All patients are required to check in at the  s desk.    Please arrive 10-15 minutes before your scheduled appointment time to complete the necessary paperwork.   Allow 1   - 2 hours for your actual appointment.   Eat and take your medications as usual.   Wear comfortable clothing and comfortable walking shoes. Therapists will be placing EKG patches on your chest and taking your blood pressure.  Please bring all of the following:    Your insurance card    A written list of medications, dosages, and frequency of use       Sep 30, 2022  3:00 PM  (Arrive by 2:45 PM)  Post-Op with [facility] CVTS  [facility] ([facility] ) [address]  [address]  [phone]        Nov 08, 2022  9:00 AM  (Arrive by 8:45 AM)  New Cardiology with Eleuterio Guadalupe MD  [facility] [place] ) [address] [place] [address]  [phone]           Care Instructions       Further instructions from your care team         Discharge Instructions for Thoracentesis  Thoracentesis is a procedure that removes extra fluid from the pleural space. The space is between the outside surface of the lungs (pleura) and the chest wall. The extra fluid is called pleural effusion. The procedure may be done to take a sample of the fluid. This is so it can be tested to help find the cause. Or it may be done to drain the extra fluid if you are having trouble breathing.     Home care  You may have some pain after the procedure. Your provider can prescribe or advise what pain medicine for you to take at home, if needed. Take these exactly as directed.  If you stopped taking other medicines before the procedure, ask your provider when you can start them again.  Take it easy for 48 hours after the procedure. Don't do anything active until your provider says it s OK.  Don't do strenuous activities, such as lifting, until your provider says it s OK.  You will have a small bandage over the puncture site. You may remove the bandage in 24 hours.  Check the puncture site for the signs of infection listed below.    Follow-up  Make a follow-up appointment with your provider as directed. During your follow-up visit, your provider will check your healing. Be sure to let your provider know how you are feeling.   When to call your healthcare provider  Call your provider right away if you have any of these:   Coughing up blood  Chest pain (if chest pain suddenly gets worse, call 911)  Shortness of breath  Fever of 100.4 F (38 C) or higher, or as directed by your provider  Pain that doesn't get better after taking pain  medicine  Signs of infection at the puncture site. These include increased pain, redness, warmth, swelling, or fluid leaking that is green or yellow or smells bad  Fluid draining from the puncture site  Bleeding from the puncture site  [employer] last reviewed this educational content on 3/1/2020    2516-5117 [employer]. All rights reserved. This information is not intended as a substitute for professional medical care. Always follow your healthcare professional's instructions.          Additional Information About Your Visit       MyChart Information    [employer] gives you secure access to your electronic health record. If you see a primary care provider, you can also send messages to your care team and make appointments. If you have questions, please call your primary care clinic.  If you do not have a primary care provider, please call [phone] and they will assist you.       Care EveryWhere ID    This is your Care EveryWhere ID. This could be used by other organizations to access your [facility] medical records  [id number]       Your Vitals Were  Most recent update: 9/23/2022  8:59 AM    Blood Pressure   118/84 (BP Location: Right arm)    Pulse   89    Respirations   20    Pulse Oximetry   96%           Primary Care Provider  Shiv Gomez MD Office Phone #  [phone] Fax #  [phone]      Equal Access to Services    CADE DUMONT : Hadantonio rubio Soabhilash, waaxda luqadaha, qaybta kaalmada adekayleeyada, keren zuluaga. So [facility] [phone].    ATENCIÓN: Si habla español, tiene a shirley disposición servicios gratuitos de asistencia lingüística. Llame al [phone].    We comply with applicable federal and state civil rights laws, including the [place] Human Rights Act. We do not discriminate on the basis of race, color, creed, Confucianism, national origin, marital status, age, disability, sex, sexual orientation, or gender identity.    If you would like an itemization  of your charges they will now be available in [facility] 30 days after discharge. To access the itemized statements in [employer] go to billing/billing summary. From there select view account. There will be multiple tabs showing an overview of your account, detail, payments, and communications. From the communications tab you can see your monthly statements, your itemized statements, and any billing letters generated for your account. If you do not have a [facility] [facility] account and need help getting access please contact [employer] support at [phone].  If you would prefer to have your itemized statements mailed please contact our automated itemized bill request line at [phone] option  2.       Thank you!    Thank you for choosing [facility] for your care. Our goal is always to provide you with excellent care. Hearing back from our patients is one way we can continue to improve our services. Please take a few minutes to complete the written survey that you may receive in the mail after you visit with us. Thank you!         Medication List      Notice    This visit is during an admission. Changes to the med list made in this visit will be reflected in the After Visit Summary of the admission.

## 2022-09-23 NOTE — TELEPHONE ENCOUNTER
[facility] Call Center    Phone Message    May a detailed message be left on voicemail: yes     Reason for Call: Medication Question or concern regarding medication   Prescription Clarification  Name of Medication: diltiazem (CARDIZEM) 60 MG tablet  Prescribing Provider: Myla Roberts   Pharmacy: [facility]   What on the order needs clarification? The medication is not covered and pt's  told pharmacist that this medication was D/C'd, ke[facility] pharmacy to verify           Action Taken: Other: Cardiology    Travel Screening: Not Applicable

## 2022-09-23 NOTE — DISCHARGE INSTRUCTIONS
Discharge Instructions for Thoracentesis  Thoracentesis is a procedure that removes extra fluid from the pleural space. The space is between the outside surface of the lungs (pleura) and the chest wall. The extra fluid is called pleural effusion. The procedure may be done to take a sample of the fluid. This is so it can be tested to help find the cause. Or it may be done to drain the extra fluid if you are having trouble breathing.     Home care  You may have some pain after the procedure. Your provider can prescribe or advise what pain medicine for you to take at home, if needed. Take these exactly as directed.  If you stopped taking other medicines before the procedure, ask your provider when you can start them again.  Take it easy for 48 hours after the procedure. Don't do anything active until your provider says it s OK.  Don't do strenuous activities, such as lifting, until your provider says it s OK.  You will have a small bandage over the puncture site. You may remove the bandage in 24 hours.  Check the puncture site for the signs of infection listed below.    Follow-up  Make a follow-up appointment with your provider as directed. During your follow-up visit, your provider will check your healing. Be sure to let your provider know how you are feeling.   When to call your healthcare provider  Call your provider right away if you have any of these:   Coughing up blood  Chest pain (if chest pain suddenly gets worse, call 911)  Shortness of breath  Fever of 100.4 F (38 C) or higher, or as directed by your provider  Pain that doesn't get better after taking pain medicine  Signs of infection at the puncture site. These include increased pain, redness, warmth, swelling, or fluid leaking that is green or yellow or smells bad  Fluid draining from the puncture site  Bleeding from the puncture site  [employer] last reviewed this educational content on 3/1/2020    1576-9344 [employer]. All rights reserved.  This information is not intended as a substitute for professional medical care. Always follow your healthcare professional's instructions.

## 2022-09-23 NOTE — TELEPHONE ENCOUNTER
Called pharmacy back to clarify. They report Diltiazem is not covered by insurance, but  told pharmacist it has been discontinued anyways and they don't need it. Was prescribed at discharge from ED 9/21. Since then, Amiodarone and Eliquis were prescribed on 9/21 after patient called in with symptoms and higher HR.   Pharmacy wants to confirm she should no longer be on Diltiazem (not covered by insurance either).   Will update providers.

## 2022-09-23 NOTE — SEDATION DOCUMENTATION
Pt had a left thoracentesis with US.  Pt tolerated well.  VSS.  Pt remained in the department for observation for 45 min post procedure.  Pt discharged to home.

## 2022-09-23 NOTE — IP AVS SNAPSHOT
[facility] Interventional Radiology  [address] [facility] [address]  Phone: [phone]                                      After Visit Summary   9/23/2022    Luis Alberto Garcia   MRN: [id number]           After Visit Summary Signature Page    I have received my discharge instructions, and my questions have been answered. I have discussed any challenges I see with this plan with the nurse or doctor.    ..........................................................................................................................................  Patient/Patient Representative Signature      ..........................................................................................................................................  Patient Representative Print Name and Relationship to Patient    ..................................................               ................................................  Date                                   Time    ..........................................................................................................................................  Reviewed by Signature/Title    ...................................................              ..............................................  Date                                               Time          22EPIC Rev 08/18

## 2022-09-24 ASSESSMENT — ACTIVITIES OF DAILY LIVING (ADL)
ADLS_ACUITY_SCORE: 35

## 2022-09-25 ASSESSMENT — ACTIVITIES OF DAILY LIVING (ADL)
ADLS_ACUITY_SCORE: 35

## 2022-09-26 DIAGNOSIS — Z95.1 S/P CABG X 5: ICD-10-CM

## 2022-09-26 LAB
BACTERIA BLD CULT: NO GROWTH
BACTERIA BLD CULT: NO GROWTH

## 2022-09-26 RX ORDER — OXYCODONE HYDROCHLORIDE 5 MG/1
5 TABLET ORAL EVERY 6 HOURS PRN
Qty: 15 TABLET | Refills: 0 | Status: SHIPPED | OUTPATIENT
Start: 2022-09-26 | End: 2023-01-06

## 2022-09-26 ASSESSMENT — ACTIVITIES OF DAILY LIVING (ADL)
ADLS_ACUITY_SCORE: 35

## 2022-09-27 ENCOUNTER — HOSPITAL ENCOUNTER (OUTPATIENT)
Dept: CARDIAC REHAB | Facility: CLINIC | Age: 72
Discharge: HOME | End: 2022-09-27
Attending: SURGERY
Payer: COMMERCIAL
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
## 2022-09-27 PROCEDURE — 93797 PHYS/QHP OP CAR RHAB WO ECG: CPT | Mod: XU

## 2022-09-27 PROCEDURE — 93798 PHYS/QHP OP CAR RHAB W/ECG: CPT

## 2022-09-27 ASSESSMENT — ACTIVITIES OF DAILY LIVING (ADL)
ADLS_ACUITY_SCORE: 35

## 2022-09-27 NOTE — TELEPHONE ENCOUNTER
Action 9/27/22   Fax #[phone]   Action Taken Requested:    Echo    Angiogram   8-24-22 8-24-22     Sent second request 10/11    Resolved images in PACS 11/10

## 2022-09-28 LAB
BACTERIA PLR CULT: NO GROWTH
GRAM STAIN RESULT: NORMAL
GRAM STAIN RESULT: NORMAL

## 2022-09-30 ENCOUNTER — OFFICE VISIT (OUTPATIENT)
Dept: CARDIOLOGY | Facility: CLINIC | Age: 72
End: 2022-09-30
Attending: SURGERY
Payer: COMMERCIAL

## 2022-09-30 VITALS
HEART RATE: 73 BPM | BODY MASS INDEX: 30.62 KG/M2 | SYSTOLIC BLOOD PRESSURE: 137 MMHG | OXYGEN SATURATION: 97 % | HEIGHT: 66 IN | DIASTOLIC BLOOD PRESSURE: 65 MMHG | WEIGHT: 190.5 LBS

## 2022-09-30 DIAGNOSIS — I35.1 NONRHEUMATIC AORTIC VALVE INSUFFICIENCY: Primary | ICD-10-CM

## 2022-09-30 PROCEDURE — G0463 HOSPITAL OUTPT CLINIC VISIT: HCPCS

## 2022-09-30 PROCEDURE — 99024 POSTOP FOLLOW-UP VISIT: CPT | Performed by: PHYSICIAN ASSISTANT

## 2022-09-30 RX ORDER — FUROSEMIDE 40 MG
TABLET ORAL
Qty: 30 TABLET | Refills: 0 | Status: SHIPPED | OUTPATIENT
Start: 2022-09-30 | End: 2022-10-05

## 2022-09-30 RX ORDER — LISINOPRIL 5 MG/1
5 TABLET ORAL DAILY
Qty: 60 TABLET | Refills: 0 | Status: SHIPPED | OUTPATIENT
Start: 2022-09-30 | End: 2022-10-05

## 2022-09-30 RX ORDER — EZETIMIBE 10 MG/1
10 TABLET ORAL DAILY
COMMUNITY
Start: 2022-09-28 | End: 2023-04-14 | Stop reason: ALTCHOICE

## 2022-09-30 RX ORDER — NYSTATIN 100000/ML
SUSPENSION, ORAL (FINAL DOSE FORM) ORAL
COMMUNITY
Start: 2022-09-28 | End: 2022-10-08

## 2022-09-30 RX ORDER — POTASSIUM CHLORIDE 1500 MG/1
TABLET, EXTENDED RELEASE ORAL
Qty: 30 TABLET | Refills: 0 | Status: SHIPPED | OUTPATIENT
Start: 2022-09-30 | End: 2023-01-06

## 2022-09-30 ASSESSMENT — PAIN SCALES - GENERAL: PAINLEVEL: NO PAIN (0)

## 2022-09-30 NOTE — NURSING NOTE
Chief Complaint   Patient presents with     Follow Up     [facility] Post-Op- CABG x5 Dr Lawson Corbetts were taken and medications reconciled.    Jw Morrissey, EMT  3:03 PM

## 2022-09-30 NOTE — PROGRESS NOTES
CARDIOTHORACIC SURGERY FOLLOW-UP VISIT     Luis Alberto Garcia   1950   [id number]      Reason for visit: Post-Op 5 v CABG with Dr. Galo Pathak on 9/14/2022.    Readmission 9/20-9/21 for atrial fibrillation with RVR and left pleural effusion    HPI: Luis Alberto Garcia is a 72 year old female seen in clinic for a routine follow-up appointment after surgery. Patient has past medical history of severe CAD, aortic valve regurgitation, HTN, Emphysema, GERD, fatty liver disease, and post-op atrial fibrillation with RVR.   She did well overall after surgery but was readmitted for A-fib with RVR and converted to NSR with Amiodarone. Discharged on Holter monitor for 7 days. She also had a left pleural effusion and patient initially deferred thoracentesis at that time. She returned for a thoracentesis on 9/23 with 500 mL removed. She was not initially discharged on anticoagulation, but was started on Eliquis a day later.  Still on Amiodarone taper, only one episode of fluttering or palpitations (30 seconds).     Still very SOB with activity and especially when carrying things, climbing stairs; can sometimes change throughout the day.   Sleeping okay. Less cough.   Still with dependent edema, PCP restarted lasix 40 mg daily yesterday (slight improvement). Still elevating her feet when able.   SBPs at home usually high 130's. HR around 70's.     Patient reports incision is healing well.    Patient denies any fever, chills, chest pain, lightheadedness and nausea.    Patient is having Normal bowel movements and voiding without problems.    Has been attending cardiac rehabilitation and that is going well.    Weight has been stable overall but still up 11 lbs since surgery.    Blood glucose levels have not been under good control.      PAST MEDICAL HISTORY:  Past Medical History:   Diagnosis Date     Anxiety      Centrilobular emphysema (H)      Childhood asthma      Coronary artery disease involving native coronary artery of native  heart      Esophageal reflux      Essential hypertension      Fatty liver disease      Hair loss      Migraine      Mild aortic stenosis      Mixed hyperlipidemia      MRSA infection     Skin abscesses after COVID vaccinations     Osteoarthritis      Primary insomnia        PAST SURGICAL HISTORY:  Past Surgical History:   Procedure Laterality Date     BYPASS GRAFT ARTERY CORONARY N/A 9/14/2022    Procedure: MEDIAN STERNOTOMY, CORONARY ARTERY BYPASS GRAFT (CABG) X  5 USING THE LEFT INTERNAL MAMMARY AND ENDOSCOPICALLY HARVESTED RIGHT AND LEFT SAPHENOUS VEIN, ON CARDIOPULMONARY BYPASS, INTRAOPERATIVE TRANSESOPHAGEAL ECHOCARDIOGRAM BY ANESTHESIA.;  Surgeon: Glao Pathak MD;  Location: [facility] OR     CATARACT IOL, RT/LT       IR LUMBAR EPIDURAL STEROID INJECTION  10/06/2005     IR LUMBAR EPIDURAL STEROID INJECTION  11/09/2005     IR THORACENTESIS  9/23/2022     Left fibular fracture ORIF  2013     REPLACEMENT TOTAL KNEE Left 2020     REPLACEMENT TOTAL KNEE Right 11/2021     ROTATOR CUFF REPAIR RT/LT Right 2017       CURRENT MEDICATIONS:   Current Outpatient Medications   Medication     acyclovir (ZOVIRAX) 400 MG tablet     albuterol (PROAIR HFA/PROVENTIL HFA/VENTOLIN HFA) 108 (90 Base) MCG/ACT inhaler     amiodarone (PACERONE) 200 MG tablet     apixaban ANTICOAGULANT (ELIQUIS) 5 MG tablet     Ergocalciferol 50 MCG (2000 UT) TABS     gabapentin (NEURONTIN) 100 MG capsule     hydrochlorothiazide (HYDRODIURIL) 50 MG tablet     magnesium 100 MG CAPS     methocarbamol (ROBAXIN) 500 MG tablet     metoprolol succinate ER (TOPROL XL) 25 MG 24 hr tablet     mupirocin (BACTROBAN) 2 % external ointment     nitroGLYcerin (NITROSTAT) 0.4 MG sublingual tablet     omeprazole (PRILOSEC) 20 MG DR capsule     oxyCODONE (ROXICODONE) 5 MG tablet     PARoxetine (PAXIL) 20 MG tablet     senna-docusate (SENOKOT-S/PERICOLACE) 8.6-50 MG tablet     sodium fluoride dental gel (PREVIDENT) 1.1 % GEL topical gel     zolpidem (AMBIEN) 5 MG tablet      aspirin (ASA) 81 MG chewable tablet     fish oil-omega-3 fatty acids 1000 MG capsule     furosemide (LASIX) 40 MG tablet     polyethylene glycol (MIRALAX) 17 GM/Dose powder     pravastatin (PRAVACHOL) 10 MG tablet     sulfamethoxazole-trimethoprim (BACTRIM) 400-80 MG tablet     No current facility-administered medications for this visit.       ALLERGIES:      Allergies   Allergen Reactions     Atorvastatin      Kiwi      Latex      Other Drug Allergy (See Comments)      X ray dye and shingrex vaccine          ROS:  Review of symptoms otherwise negative unless commented about in HPI.     LABS:  Last Basic Metabolic Panel:  Lab Results   Component Value Date     09/21/2022      Lab Results   Component Value Date    POTASSIUM 3.9 09/21/2022    POTASSIUM 4.1 09/14/2022     Lab Results   Component Value Date    CHLORIDE 101 09/21/2022     Lab Results   Component Value Date    ALYCE 8.7 09/21/2022     Lab Results   Component Value Date    CO2 24 09/21/2022     Lab Results   Component Value Date    BUN 13.4 09/21/2022     Lab Results   Component Value Date    CR 0.61 09/21/2022     Lab Results   Component Value Date     09/21/2022     09/16/2022       Last CBC:   Lab Results   Component Value Date    WBC 10.2 09/21/2022     Lab Results   Component Value Date    RBC 2.84 09/21/2022     Lab Results   Component Value Date    HGB 8.5 09/21/2022     Lab Results   Component Value Date    HCT 26.4 09/21/2022     No components found for: MCT  Lab Results   Component Value Date    MCV 93 09/21/2022     Lab Results   Component Value Date    MCH 29.9 09/21/2022     Lab Results   Component Value Date    MCHC 32.2 09/21/2022     Lab Results   Component Value Date    RDW 15.0 09/21/2022     Lab Results   Component Value Date     09/21/2022       INR:  Lab Results   Component Value Date    INR 1.04 09/20/2022    INR 1.30 09/14/2022    INR 1.41 09/14/2022    INR 1.48 09/14/2022    INR 1.08 09/08/2022  "      IMAGING:  None    PHYSICAL EXAM:   /65 (BP Location: Right arm, Patient Position: Chair, Cuff Size: Adult Regular)   Pulse 73   Ht 1.68 m (5' 6.14\")   Wt 86.4 kg (190 lb 8 oz)   SpO2 97%   BMI 30.62 kg/m    General: alert and oriented x 3, pleasant, no acute distress, normal mood and affect  Neuro: no focal deficits   CV: S1 S2, systolic murmur 2/6, no rubs or gallops, regular rate and rhythm, 1+ bilateral peripheral edema  Pulm: bilateral breath sounds, clear to auscultation, easy work of breathing  Incision: incisions clean dry and intact without erythema, swelling or drainage.     PROCEDURES: None       ASSESSMENT/PLAN:  Luis Alberto Garcia is a 72 year old year old female status post CABG who returns to clinic for postop visit.     1. Surgically doing well overall.  Incisions are healing well with no signs of infection. Increasing activity and strength overall.   2. Hemodynamics are stable. See below for medication changes.   3. Follow up with your cardiologist, Dr Elmer Garsia, as scheduled on 10/5/22.  4. Follow up with your PCP as needed.  5. Continue Outpatient Cardiac Rehab until completed.   6. Continue sternal precautions for 12 weeks from surgery date.   7. No driving for 4 weeks from surgery date.   8. HTN: had not restarted anti-HTN medications, goal for SBP to be in 120's. Restart PTA lisinopril 5 mg. Can increase to 10 mg daily if not achieving goals.   9. Diuresis: can increase to lasix 40 mg PO BID for a few days and then resume 40 mg daily until closer to pre-op weight (~177). Take potassium once or twice daily to match lasix dosing.  10. SOB: ordered 2 view CXR for today, if possible.   11. Ordered ECHO, likely to be scheduled for 10/5/22 with Cardiology appt.       The total time spent with the patient was 30 minutes, > 50% of which was spent in counseling.    CC  SHAINA MONTELONGO     "

## 2022-09-30 NOTE — PATIENT INSTRUCTIONS
1. Surgically doing well overall.  Incisions are healing well with no signs of infection. Increasing activity and strength overall.   2. Hemodynamics are stable. See below for medication changes.   3. Follow up with your cardiologist, Dr Elmer Garsia, as scheduled on 10/5/22.  4. Follow up with your PCP as needed.  5. Continue Outpatient Cardiac Rehab until completed.   6. Continue sternal precautions for 12 weeks from surgery date.   7. No driving for 4 weeks from surgery date.   8. HTN: had not restarted anti-HTN medications, goal for SBP to be in 120's. Restart PTA lisinopril 5 mg. Can increase to 10 mg daily if not achieving goals.   9. Diuresis: can increase to lasix 40 mg PO BID for a few days and then resume 40 mg daily until closer to pre-op weight (~177). Take potassium once or twice daily to match lasix dosing.  10. SOB: ordered 2 view CXR for today, if possible.   11. Ordered ECHO, likely to be scheduled for 10/5/22 with Cardiology appt.

## 2022-09-30 NOTE — LETTER
9/30/2022      RE: Luis Alberto Garcia  [address] [place] [address]       Dear Colleague,    Thank you for the opportunity to participate in the care of your patient, Luis Alberto Garcia, at the [facility]ART [facility] [place] at [facility]. Please see a copy of my visit note below.    CARDIOTHORACIC SURGERY FOLLOW-UP VISIT     Luis Alberto Garcia   1950   [id number]      Reason for visit: Post-Op 5 v CABG with Dr. Galo Pathak on 9/14/2022.    Readmission 9/20-9/21 for atrial fibrillation with RVR and left pleural effusion    HPI: Luis Alberto Garcia is a 72 year old female seen in clinic for a routine follow-up appointment after surgery. Patient has past medical history of severe CAD, aortic valve regurgitation, HTN, Emphysema, GERD, fatty liver disease, and post-op atrial fibrillation with RVR.   She did well overall after surgery but was readmitted for A-fib with RVR and converted to NSR with Amiodarone. Discharged on Holter monitor for 7 days. She also had a left pleural effusion and patient initially deferred thoracentesis at that time. She returned for a thoracentesis on 9/23 with 500 mL removed. She was not initially discharged on anticoagulation, but was started on Eliquis a day later.  Still on Amiodarone taper, only one episode of fluttering or palpitations (30 seconds).     Still very SOB with activity and especially when carrying things, climbing stairs; can sometimes change throughout the day.   Sleeping okay. Less cough.   Still with dependent edema, PCP restarted lasix 40 mg daily yesterday (slight improvement). Still elevating her feet when able.   SBPs at home usually high 130's. HR around 70's.     Patient reports incision is healing well.    Patient denies any fever, chills, chest pain, lightheadedness and nausea.    Patient is having Normal bowel movements and voiding without problems.    Has been attending cardiac rehabilitation and that is  going well.    Weight has been stable overall but still up 11 lbs since surgery.    Blood glucose levels have not been under good control.      PAST MEDICAL HISTORY:  Past Medical History:   Diagnosis Date     Anxiety      Centrilobular emphysema (H)      Childhood asthma      Coronary artery disease involving native coronary artery of native heart      Esophageal reflux      Essential hypertension      Fatty liver disease      Hair loss      Migraine      Mild aortic stenosis      Mixed hyperlipidemia      MRSA infection     Skin abscesses after COVID vaccinations     Osteoarthritis      Primary insomnia        PAST SURGICAL HISTORY:  Past Surgical History:   Procedure Laterality Date     BYPASS GRAFT ARTERY CORONARY N/A 9/14/2022    Procedure: MEDIAN STERNOTOMY, CORONARY ARTERY BYPASS GRAFT (CABG) X  5 USING THE LEFT INTERNAL MAMMARY AND ENDOSCOPICALLY HARVESTED RIGHT AND LEFT SAPHENOUS VEIN, ON CARDIOPULMONARY BYPASS, INTRAOPERATIVE TRANSESOPHAGEAL ECHOCARDIOGRAM BY ANESTHESIA.;  Surgeon: Galo Pathak MD;  Location: [facility] OR     CATARACT IOL, RT/LT       IR LUMBAR EPIDURAL STEROID INJECTION  10/06/2005     IR LUMBAR EPIDURAL STEROID INJECTION  11/09/2005     IR THORACENTESIS  9/23/2022     Left fibular fracture ORIF  2013     REPLACEMENT TOTAL KNEE Left 2020     REPLACEMENT TOTAL KNEE Right 11/2021     ROTATOR CUFF REPAIR RT/LT Right 2017       CURRENT MEDICATIONS:   Current Outpatient Medications   Medication     acyclovir (ZOVIRAX) 400 MG tablet     albuterol (PROAIR HFA/PROVENTIL HFA/VENTOLIN HFA) 108 (90 Base) MCG/ACT inhaler     amiodarone (PACERONE) 200 MG tablet     apixaban ANTICOAGULANT (ELIQUIS) 5 MG tablet     Ergocalciferol 50 MCG (2000 UT) TABS     gabapentin (NEURONTIN) 100 MG capsule     hydrochlorothiazide (HYDRODIURIL) 50 MG tablet     magnesium 100 MG CAPS     methocarbamol (ROBAXIN) 500 MG tablet     metoprolol succinate ER (TOPROL XL) 25 MG 24 hr tablet     mupirocin (BACTROBAN) 2 % external  ointment     nitroGLYcerin (NITROSTAT) 0.4 MG sublingual tablet     omeprazole (PRILOSEC) 20 MG DR capsule     oxyCODONE (ROXICODONE) 5 MG tablet     PARoxetine (PAXIL) 20 MG tablet     senna-docusate (SENOKOT-S/PERICOLACE) 8.6-50 MG tablet     sodium fluoride dental gel (PREVIDENT) 1.1 % GEL topical gel     zolpidem (AMBIEN) 5 MG tablet     aspirin (ASA) 81 MG chewable tablet     fish oil-omega-3 fatty acids 1000 MG capsule     furosemide (LASIX) 40 MG tablet     polyethylene glycol (MIRALAX) 17 GM/Dose powder     pravastatin (PRAVACHOL) 10 MG tablet     sulfamethoxazole-trimethoprim (BACTRIM) 400-80 MG tablet     No current facility-administered medications for this visit.       ALLERGIES:      Allergies   Allergen Reactions     Atorvastatin      Kiwi      Latex      Other Drug Allergy (See Comments)      X ray dye and shingrex vaccine          ROS:  Review of symptoms otherwise negative unless commented about in HPI.     LABS:  Last Basic Metabolic Panel:  Lab Results   Component Value Date     09/21/2022      Lab Results   Component Value Date    POTASSIUM 3.9 09/21/2022    POTASSIUM 4.1 09/14/2022     Lab Results   Component Value Date    CHLORIDE 101 09/21/2022     Lab Results   Component Value Date    ALYCE 8.7 09/21/2022     Lab Results   Component Value Date    CO2 24 09/21/2022     Lab Results   Component Value Date    BUN 13.4 09/21/2022     Lab Results   Component Value Date    CR 0.61 09/21/2022     Lab Results   Component Value Date     09/21/2022     09/16/2022       Last CBC:   Lab Results   Component Value Date    WBC 10.2 09/21/2022     Lab Results   Component Value Date    RBC 2.84 09/21/2022     Lab Results   Component Value Date    HGB 8.5 09/21/2022     Lab Results   Component Value Date    HCT 26.4 09/21/2022     No components found for: MCT  Lab Results   Component Value Date    MCV 93 09/21/2022     Lab Results   Component Value Date    MCH 29.9 09/21/2022     Lab Results  "  Component Value Date    MCHC 32.2 09/21/2022     Lab Results   Component Value Date    RDW 15.0 09/21/2022     Lab Results   Component Value Date     09/21/2022       INR:  Lab Results   Component Value Date    INR 1.04 09/20/2022    INR 1.30 09/14/2022    INR 1.41 09/14/2022    INR 1.48 09/14/2022    INR 1.08 09/08/2022       IMAGING:  None    PHYSICAL EXAM:   /65 (BP Location: Right arm, Patient Position: Chair, Cuff Size: Adult Regular)   Pulse 73   Ht 1.68 m (5' 6.14\")   Wt 86.4 kg (190 lb 8 oz)   SpO2 97%   BMI 30.62 kg/m    General: alert and oriented x 3, pleasant, no acute distress, normal mood and affect  Neuro: no focal deficits   CV: S1 S2, systolic murmur 2/6, no rubs or gallops, regular rate and rhythm, 1+ bilateral peripheral edema  Pulm: bilateral breath sounds, clear to auscultation, easy work of breathing  Incision: incisions clean dry and intact without erythema, swelling or drainage.     PROCEDURES: None       ASSESSMENT/PLAN:  Luis Alberto Garcia is a 72 year old year old female status post CABG who returns to clinic for postop visit.     1. Surgically doing well overall.  Incisions are healing well with no signs of infection. Increasing activity and strength overall.   2. Hemodynamics are stable. See below for medication changes.   3. Follow up with your cardiologist, Dr Elmer Garsia, as scheduled on 10/5/22.  4. Follow up with your PCP as needed.  5. Continue Outpatient Cardiac Rehab until completed.   6. Continue sternal precautions for 12 weeks from surgery date.   7. No driving for 4 weeks from surgery date.   8. HTN: had not restarted anti-HTN medications, goal for SBP to be in 120's. Restart PTA lisinopril 5 mg. Can increase to 10 mg daily if not achieving goals.   9. Diuresis: can increase to lasix 40 mg PO BID for a few days and then resume 40 mg daily until closer to pre-op weight (~177). Take potassium once or twice daily to match lasix dosing.  10. SOB: ordered 2 view " CXR for today, if possible.   11. Ordered ECHO, likely to be scheduled for 10/5/22 with Cardiology appt.       The total time spent with the patient was 30 minutes, > 50% of which was spent in counseling.    CC  SHAINA MONTELONGO         Please do not hesitate to contact me if you have any questions/concerns.     Sincerely,     Cardiovascular Thoracic Surgery

## 2022-10-01 ENCOUNTER — TELEPHONE (OUTPATIENT)
Dept: SURGERY | Facility: CLINIC | Age: 72
End: 2022-10-01

## 2022-10-01 NOTE — TELEPHONE ENCOUNTER
Patient called about the results of her recent outpatient CXR results with the CVTS team. She was concerned the report shows there is still fluid despite her recent thoracentesis.     She continues to have shortness of breath with exertion which is her baseline and it improves with rest. She was wondering what the plan was with these CXR results.     Since she is currently at her baseline breathing status and it is not getting acutely worse would recommend waiting until Monday to call the outpatient CVTS clinic. If her breathing gets worse she should present to her nearest ED to be evaluated. She believes her symptoms are not acutely worse and she can wait until Monday to call the clinic to discuss next steps. She agreed to presenting to nearest ED or urgent care if her symptoms worsen.    Shaye Palomo  General Surgery Resident, PGY-3

## 2022-10-02 ENCOUNTER — TELEPHONE (OUTPATIENT)
Dept: CARDIOLOGY | Facility: CLINIC | Age: 72
End: 2022-10-02

## 2022-10-02 ENCOUNTER — HEALTH MAINTENANCE LETTER (OUTPATIENT)
Age: 72
End: 2022-10-02

## 2022-10-02 NOTE — TELEPHONE ENCOUNTER
Called patient for CXR follow up.  -she was only able to start extra lasix (BID instead of daily) yesterday, not Friday.   -no changes in SOB/breathing, not better or worse.     CXR 9/30:   Upright, PA and lateral views of the chest. Cardiac silhouette is normal size. Decreased small  to moderate left pleural effusion with less prominent overlying atelectasis. Small right pleural  effusion. No pneumothorax. The upper abdomen appears normal. No acute osseous abnormalities.    Impression:   Decreased small to moderate left pleural effusion.      A/P:  - Lasix 40 mg twice daily through Wed or Thursday.   - A-fib: usually will anticoagulate for 3 months, can review with Dr López this week.      John Amor PA-C  Cardiothoracic Surgery  Pager [phone]    10:59 AM   October 2, 2022

## 2022-10-03 ENCOUNTER — HOSPITAL ENCOUNTER (OUTPATIENT)
Dept: CARDIAC REHAB | Facility: CLINIC | Age: 72
Discharge: HOME | End: 2022-10-03
Attending: SURGERY
Payer: COMMERCIAL

## 2022-10-03 ENCOUNTER — TELEPHONE (OUTPATIENT)
Dept: CARDIOLOGY | Facility: CLINIC | Age: 72
End: 2022-10-03

## 2022-10-03 DIAGNOSIS — Z95.1 S/P CABG (CORONARY ARTERY BYPASS GRAFT): ICD-10-CM

## 2022-10-03 PROCEDURE — 93798 PHYS/QHP OP CAR RHAB W/ECG: CPT

## 2022-10-03 NOTE — TELEPHONE ENCOUNTER
Called pt to inform of ECHO scheduled for 10/5 at [facility]. Spoke with family member who will relay message.

## 2022-10-05 ENCOUNTER — PRE VISIT (OUTPATIENT)
Dept: CARDIOLOGY | Facility: CLINIC | Age: 72
End: 2022-10-05

## 2022-10-05 ENCOUNTER — OFFICE VISIT (OUTPATIENT)
Dept: CARDIOLOGY | Facility: CLINIC | Age: 72
End: 2022-10-05
Attending: INTERNAL MEDICINE
Payer: COMMERCIAL

## 2022-10-05 ENCOUNTER — HOSPITAL ENCOUNTER (OUTPATIENT)
Dept: CARDIOLOGY | Facility: CLINIC | Age: 72
Discharge: HOME | End: 2022-10-05
Attending: PHYSICIAN ASSISTANT
Payer: COMMERCIAL

## 2022-10-05 VITALS
SYSTOLIC BLOOD PRESSURE: 133 MMHG | HEIGHT: 66 IN | HEART RATE: 65 BPM | DIASTOLIC BLOOD PRESSURE: 70 MMHG | WEIGHT: 183.1 LBS | OXYGEN SATURATION: 96 % | BODY MASS INDEX: 29.43 KG/M2

## 2022-10-05 DIAGNOSIS — I48.0 PAROXYSMAL ATRIAL FIBRILLATION (H): Primary | ICD-10-CM

## 2022-10-05 DIAGNOSIS — Z95.1 S/P CABG (CORONARY ARTERY BYPASS GRAFT): ICD-10-CM

## 2022-10-05 DIAGNOSIS — I25.810 CORONARY ARTERY DISEASE INVOLVING AUTOLOGOUS ARTERY CORONARY BYPASS GRAFT WITHOUT ANGINA PECTORIS: ICD-10-CM

## 2022-10-05 DIAGNOSIS — I35.1 NONRHEUMATIC AORTIC VALVE INSUFFICIENCY: ICD-10-CM

## 2022-10-05 LAB — LVEF ECHO: NORMAL

## 2022-10-05 PROCEDURE — 93005 ELECTROCARDIOGRAM TRACING: CPT

## 2022-10-05 PROCEDURE — 93306 TTE W/DOPPLER COMPLETE: CPT

## 2022-10-05 PROCEDURE — G0463 HOSPITAL OUTPT CLINIC VISIT: HCPCS | Mod: 25

## 2022-10-05 PROCEDURE — 99204 OFFICE O/P NEW MOD 45 MIN: CPT | Mod: 25 | Performed by: INTERNAL MEDICINE

## 2022-10-05 PROCEDURE — 93306 TTE W/DOPPLER COMPLETE: CPT | Mod: 26 | Performed by: INTERNAL MEDICINE

## 2022-10-05 RX ORDER — POTASSIUM CHLORIDE 1500 MG/1
20 TABLET, EXTENDED RELEASE ORAL 2 TIMES DAILY
COMMUNITY
Start: 2022-09-30 | End: 2023-04-14

## 2022-10-05 RX ORDER — FUROSEMIDE 20 MG
20 TABLET ORAL DAILY
Qty: 30 TABLET | Refills: 3 | Status: SHIPPED | OUTPATIENT
Start: 2022-10-05 | End: 2023-03-02

## 2022-10-05 RX ORDER — LISINOPRIL 10 MG/1
10 TABLET ORAL DAILY
Qty: 90 TABLET | Refills: 3 | Status: SHIPPED | OUTPATIENT
Start: 2022-10-05 | End: 2023-04-14 | Stop reason: ALTCHOICE

## 2022-10-05 ASSESSMENT — PAIN SCALES - GENERAL: PAINLEVEL: NO PAIN (0)

## 2022-10-05 NOTE — NURSING NOTE
Chief Complaint   Patient presents with     New Patient     AF sp CABG 9/14/22, readmitted 9/20 with AF. Team requesting follow-up in EP 1-2 weeks post discharge. Review biotel showing 9% AF avg 87bpm - online as of 9/30.     Vitals were taken and medications reconciled.    SHRUTI Pittman  9:59 AM

## 2022-10-05 NOTE — LETTER
10/5/2022      RE: Luis Alberto Garcia  [address] [place] [address]       Dear Colleague,    Thank you for the opportunity to participate in the care of your patient, Luis Alberto Garcia, at the [facility] HEART CLINIC [place] at [facility]. Please see a copy of my visit note below.        ELECTROPHYSIOLOGY CLINIC VISIT    Assessment/Recommendations   Assessment/Plan:    Ms. Garcia is a 72 year old female who has a past medical history significant for CAD s/p CABG X5 (LIMA-Diag, rSVG to LAD, PDA, AILIN, OM) 9/14/22 , HTN, fatty liver, aortic regurgitation, GERD, emphysema, tobacco use, migraines, anxiety, and insomnia.     CAD s/p CABGX5 9/14/22:  1. Antiplatlet: ASA  2. Statin: Zetia  3. BB:  Toprol XL   4. ACEi/ARB: Lisinopril, increase to Lisinopril 10 mg daily with BMP in 1 week for better BP control.   5. On lasix after surgery, decrease to Lasix 20 mg daily and can consider stopping if volume status remains good at follow-up.   6. Nitroglycerin 0.4 mg PRN for chest pain. Place 1 tablet (0.4 mg) under the tongue every 5 minutes as needed for chest pain. Maximum of 3 doses in 15 minutes.  7. Lifestyle: Avoid tobacco, maintain a healthy weight, limit alcohol, cardiac diet, and exercise.  8. Referral to establish with Dr. Doshi.    Post Operative Atrial Fibrillation:   1. Stroke Prophylaxis:  CHADSVASC=+CAD, +age, +gender, +HTN  4, corresponding to a 4.0% annual stroke / systemic emolism event rate. indicating need for long term oral anticoagulation.  She has been on Eliquis for post operative AF. Will keep for now given continued episodes of AF but can decide at next follow-up about chronicity.   2. Rate Control: Continue Toprol XL.   3. Rhythm Control: Cardioversion, Antiarrhythmics and/or ablation are options for rhythm control. She developed post operative AF and self converted. She was given short course of amiodarone. A cardiac monitor showed 9% PAF  burden. This is all occurring within first month after surgery. We will allow for further post operative healing.  If further episodes of AF outside of acute post operative time, then would need to consider AAT and/or ablation as needed.     4. Risk Factor Management: BP control, and MARKO evaluation.        Follow up with EP  in 3 months with zio patch completed prior.        History of Present Illness/Subjective    Ms. Luis Alberto Garcia is a 72 year old female who comes in today for EP consultation of post operative AF.    Ms. Garcia is a 72 year old female who has a past medical history significant for CAD s/p CABG X5 (LIMA-Diag, rSVG to LAD, PDA, AILIN, OM) 9/14/22 , HTN, fatty liver, aortic regurgitation, GERD, emphysema, tobacco use, migraines, anxiety, and insomnia.     She had been experiencing jaw/neck pain, chest pressure, and LOPEZ for last 10 months. He had a Lexiscan that showed normal perfusion but abnormal ECG. He then had a CT coronary that showed moderate coronary calcifications. Subsequent coronary angiogram showed severe multivessel CAD. He was referred to CVTS. He underwent CABGX5 on 9/14/22. She was discharged 9/19 POD#6. She then awoke 9/20 with acute palpitations, dyspnea at rest+exertion, radiating neck pain, and presyncope. At CVTS clinic, ECG illustrated AF with RVR in the 140s and sent to ER. No prior history of subjective or document arrythmia. Commented that she was febrile post-op, and endorsing feeling flushed. She was placed on IV Amio and heparin infusion. She spontaneously converted overnight. Echo negative for pericardial effusion with no acute changes and LVEF 60-65%. She will discharge to home with Diltiazem 60 mg q6h prn in setting of HR > 110, amiodarone, and on cardiac monitor. BioTel cardiac monitor showed 9% PAF burden. She has She was referred to EP for further management.     She reports feeling OK. Her main problem is LOPEZ, variable with different levels of exertion, and feels it  "is worse in evenings. BPs at home running 130-140 range. She denies chest discomfort, palpitations, abdominal fullness/bloating or peripheral edema, paroxysmal nocturnal dyspnea, orthopnea, lightheadedness, dizziness, pre-syncope, or syncope. An echo today shows LVEF 65%. LFT and TFT from 9/2022 WDL. Presenting 12 lead ECG shows NSR Vent Rate 64 bpm,  ms, QRS 84 ms, QTc 447 ms. Current cardiac medications include: Lasix, Amiodarone, Toprol XL, Eliquis, Lisinopril, ASA, and Zetia.     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     I have reviewed and updated the patient's Past Medical History, Social History, Family History and Medication List.     Cardiographics (Personally Reviewed) :   9/21/22 Echo:   Interpretation Summary  Global and regional left ventricular function is normal with an EF of 60-65%.  Global right ventricular function is normal.  Mild to moderate AS and AI.  IVC diameter <2.1 cm collapsing >50% with sniff suggests a normal RA pressure  of 3 mmHg.  No pericardial effusion is present.  No significant changes noted.    TTE 10/5/2022:  Interpretation Summary  Global and regional left ventricular function is hyperkinetic with an EF of 65-70%.  Right ventricular function, chamber size, wall motion, and thickness are normal.  Mild to moderate aortic insufficiency is present.  Pulmonary artery systolic pressure is normal.  The inferior vena cava is normal.  No significant changes noted.       Physical Examination   /70 (BP Location: Right arm, Patient Position: Chair, Cuff Size: Adult Regular)   Pulse 65   Ht 1.676 m (5' 6\")   Wt 83.1 kg (183 lb 1.6 oz)   SpO2 96%   BMI 29.55 kg/m    Wt Readings from Last 3 Encounters:   09/30/22 86.4 kg (190 lb 8 oz)   09/20/22 85.3 kg (188 lb)   09/19/22 84.8 kg (186 lb 15.2 oz)     General Appearance:   Alert, well-appearing and in no acute distress.   HEENT: Atraumatic, normocephalic. PERRL.  MMM.   Chest/Lungs:   " Respirations unlabored.  Lungs are clear to auscultation.   Cardiovascular:   Regular rate and rhythm.  S1/S2. No murmur.    Abdomen:  Soft, nontender, nondistended.   Extremities: No cyanosis or clubbing. No edema.    Musculoskeletal: Moves all extremities.  Gait normal.   Skin: Warm, dry, intact.    Neurologic: Mood and affect are appropriate.  Alert and oriented to person, place, time, and situation.          Medications  Allergies   Current Outpatient Medications   Medication Sig Dispense Refill     acyclovir (ZOVIRAX) 400 MG tablet Take 400 mg by mouth daily as needed       albuterol (PROAIR HFA/PROVENTIL HFA/VENTOLIN HFA) 108 (90 Base) MCG/ACT inhaler Inhale 2 puffs into the lungs every 6 hours       amiodarone (PACERONE) 200 MG tablet Take 1 tablet (200 mg) by mouth daily for 30 days 30 tablet 0     apixaban ANTICOAGULANT (ELIQUIS) 5 MG tablet Take 1 tablet (5 mg) by mouth 2 times daily for 37 days 74 tablet 0     aspirin (ASA) 81 MG chewable tablet Take 1 tablet (81 mg) by mouth daily (Patient not taking: Reported on 9/30/2022) 60 tablet 3     Ergocalciferol 50 MCG (2000 UT) TABS 50 mcg every morning       ezetimibe (ZETIA) 10 MG tablet Take 10 mg by mouth daily       fish oil-omega-3 fatty acids 1000 MG capsule Take 2 g by mouth every morning (Patient not taking: Reported on 9/30/2022)       furosemide (LASIX) 40 MG tablet Take 40 mg PO BID for 1-3 days, then resume 40 mg PO daily 30 tablet 0     furosemide (LASIX) 40 MG tablet Take 1 tablet (40 mg) by mouth daily for 7 days 7 tablet 0     gabapentin (NEURONTIN) 100 MG capsule 200 mg At Bedtime       hydrochlorothiazide (HYDRODIURIL) 50 MG tablet Take 0.5 tablets (25 mg) by mouth every morning 30 tablet 3     lisinopril (ZESTRIL) 5 MG tablet Take 1 tablet (5 mg) by mouth daily 60 tablet 0     magnesium 100 MG CAPS 200 mg At Bedtime       methocarbamol (ROBAXIN) 500 MG tablet Take 2 tablets (1,000 mg) by mouth every 6 hours as needed for muscle spasms or  other (pain) 100 tablet 0     metoprolol succinate ER (TOPROL XL) 25 MG 24 hr tablet Take 25 mg by mouth every morning       mupirocin (BACTROBAN) 2 % external ointment as needed       nitroGLYcerin (NITROSTAT) 0.4 MG sublingual tablet 0.4 mg every 5 minutes as needed       nystatin (MYCOSTATIN) 420091 UNIT/ML suspension 5 ml swish and swallow or swish and spit 4 times per day for 10 days       omeprazole (PRILOSEC) 20 MG DR capsule 20 mg At Bedtime       oxyCODONE (ROXICODONE) 5 MG tablet Take 1 tablet (5 mg) by mouth every 6 hours as needed for moderate to severe pain 15 tablet 0     PARoxetine (PAXIL) 20 MG tablet Take 20 mg by mouth every morning       polyethylene glycol (MIRALAX) 17 GM/Dose powder Take 17 g by mouth daily as needed for constipation (Patient not taking: No sig reported) 510 g 0     potassium chloride ER (K-TAB) 20 MEQ CR tablet Take Once or Twice daily to match lasix dose 30 tablet 0     pravastatin (PRAVACHOL) 10 MG tablet Take 10 mg by mouth every morning (Patient not taking: No sig reported)       senna-docusate (SENOKOT-S/PERICOLACE) 8.6-50 MG tablet 1 tablet by Oral or Feeding Tube route 2 times daily as needed for constipation 60 tablet 0     sodium fluoride dental gel (PREVIDENT) 1.1 % GEL topical gel Brush 2x/day.  Do not eat or drink for 30 minutes after.       sulfamethoxazole-trimethoprim (BACTRIM) 400-80 MG tablet Take 1 tablet by mouth 2 times daily (Patient not taking: Reported on 9/30/2022) 14 tablet 0     zolpidem (AMBIEN) 5 MG tablet Take 0.5 tablets (2.5 mg) by mouth nightly as needed for sleep 10 tablet 0    Allergies   Allergen Reactions     Atorvastatin      Kiwi      Latex      Other Drug Allergy (See Comments)      X ray dye and shingrex vaccine          Lab Results (Personally Reviewed)    Chemistry/lipid CBC Cardiac Enzymes/BNP/TSH/INR   Lab Results   Component Value Date    BUN 13.4 09/21/2022     09/21/2022    CO2 24 09/21/2022     Creatinine   Date Value Ref  Range Status   09/21/2022 0.61 0.51 - 0.95 mg/dL Final       No results found for: CHOL, HDL, LDL, CHOLHDL   Lab Results   Component Value Date    WBC 10.2 09/21/2022    HGB 8.5 (L) 09/21/2022    HCT 26.4 (L) 09/21/2022    MCV 93 09/21/2022     09/21/2022    Lab Results   Component Value Date    TSH 1.89 09/20/2022    INR 1.04 09/20/2022        The patient states understanding and is agreeable with the plan.   Elmer López MD [facility]  Cardiology - Electrophysiology      Total time spent on patient visit, reviewing notes, imaging, labs, orders, and completing necessary documentation: 45 minutes.

## 2022-10-05 NOTE — PROGRESS NOTES
ELECTROPHYSIOLOGY CLINIC VISIT    Assessment/Recommendations   Assessment/Plan:    Ms. Garcia is a 72 year old female who has a past medical history significant for CAD s/p CABG X5 (LIMA-Diag, rSVG to LAD, PDA, AILIN, OM) 9/14/22 , HTN, fatty liver, aortic regurgitation, GERD, emphysema, tobacco use, migraines, anxiety, and insomnia.     CAD s/p CABGX5 9/14/22:  1. Antiplatlet: ASA  2. Statin: Zetia  3. BB:  Toprol XL   4. ACEi/ARB: Lisinopril, increase to Lisinopril 10 mg daily with BMP in 1 week for better BP control.   5. On lasix after surgery, decrease to Lasix 20 mg daily and can consider stopping if volume status remains good at follow-up.   6. Nitroglycerin 0.4 mg PRN for chest pain. Place 1 tablet (0.4 mg) under the tongue every 5 minutes as needed for chest pain. Maximum of 3 doses in 15 minutes.  7. Lifestyle: Avoid tobacco, maintain a healthy weight, limit alcohol, cardiac diet, and exercise.  8. Referral to establish with Dr. Doshi.    Post Operative Atrial Fibrillation:   1. Stroke Prophylaxis:  CHADSVASC=+CAD, +age, +gender, +HTN  4, corresponding to a 4.0% annual stroke / systemic emolism event rate. indicating need for long term oral anticoagulation.  She has been on Eliquis for post operative AF. Will keep for now given continued episodes of AF but can decide at next follow-up about chronicity.   2. Rate Control: Continue Toprol XL.   3. Rhythm Control: Cardioversion, Antiarrhythmics and/or ablation are options for rhythm control. She developed post operative AF and self converted. She was given short course of amiodarone. A cardiac monitor showed 9% PAF burden. This is all occurring within first month after surgery. We will allow for further post operative healing.  If further episodes of AF outside of acute post operative time, then would need to consider AAT and/or ablation as needed.     4. Risk Factor Management: BP control, and MARKO evaluation.        Follow up with EP  in 3 months with alistair  patch completed prior.        History of Present Illness/Subjective    Ms. Luis Alberto Garcia is a 72 year old female who comes in today for EP consultation of post operative AF.    Ms. Garcia is a 72 year old female who has a past medical history significant for CAD s/p CABG X5 (LIMA-Diag, rSVG to LAD, PDA, AILIN, OM) 9/14/22 , HTN, fatty liver, aortic regurgitation, GERD, emphysema, tobacco use, migraines, anxiety, and insomnia.     She had been experiencing jaw/neck pain, chest pressure, and LOPEZ for last 10 months. He had a Lexiscan that showed normal perfusion but abnormal ECG. He then had a CT coronary that showed moderate coronary calcifications. Subsequent coronary angiogram showed severe multivessel CAD. He was referred to CVTS. He underwent CABGX5 on 9/14/22. She was discharged 9/19 POD#6. She then awoke 9/20 with acute palpitations, dyspnea at rest+exertion, radiating neck pain, and presyncope. At CVTS clinic, ECG illustrated AF with RVR in the 140s and sent to ER. No prior history of subjective or document arrythmia. Commented that she was febrile post-op, and endorsing feeling flushed. She was placed on IV Amio and heparin infusion. She spontaneously converted overnight. Echo negative for pericardial effusion with no acute changes and LVEF 60-65%. She will discharge to home with Diltiazem 60 mg q6h prn in setting of HR > 110, amiodarone, and on cardiac monitor. [employer] cardiac monitor showed 9% PAF burden. She has She was referred to EP for further management.     She reports feeling OK. Her main problem is LOPEZ, variable with different levels of exertion, and feels it is worse in evenings. BPs at home running 130-140 range. She denies chest discomfort, palpitations, abdominal fullness/bloating or peripheral edema, paroxysmal nocturnal dyspnea, orthopnea, lightheadedness, dizziness, pre-syncope, or syncope. An echo today shows LVEF 65%. LFT and TFT from 9/2022 WDL. Presenting 12 lead ECG shows NSR Vent Rate 64  "bpm,  ms, QRS 84 ms, QTc 447 ms. Current cardiac medications include: Lasix, Amiodarone, Toprol XL, Eliquis, Lisinopril, ASA, and Zetia.     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     I have reviewed and updated the patient's Past Medical History, Social History, Family History and Medication List.     Cardiographics (Personally Reviewed) :   9/21/22 Echo:   Interpretation Summary  Global and regional left ventricular function is normal with an EF of 60-65%.  Global right ventricular function is normal.  Mild to moderate AS and AI.  IVC diameter <2.1 cm collapsing >50% with sniff suggests a normal RA pressure  of 3 mmHg.  No pericardial effusion is present.  No significant changes noted.    TTE 10/5/2022:  Interpretation Summary  Global and regional left ventricular function is hyperkinetic with an EF of 65-70%.  Right ventricular function, chamber size, wall motion, and thickness are normal.  Mild to moderate aortic insufficiency is present.  Pulmonary artery systolic pressure is normal.  The inferior vena cava is normal.  No significant changes noted.       Physical Examination   /70 (BP Location: Right arm, Patient Position: Chair, Cuff Size: Adult Regular)   Pulse 65   Ht 1.676 m (5' 6\")   Wt 83.1 kg (183 lb 1.6 oz)   SpO2 96%   BMI 29.55 kg/m    Wt Readings from Last 3 Encounters:   09/30/22 86.4 kg (190 lb 8 oz)   09/20/22 85.3 kg (188 lb)   09/19/22 84.8 kg (186 lb 15.2 oz)     General Appearance:   Alert, well-appearing and in no acute distress.   HEENT: Atraumatic, normocephalic. PERRL.  MMM.   Chest/Lungs:   Respirations unlabored.  Lungs are clear to auscultation.   Cardiovascular:   Regular rate and rhythm.  S1/S2. No murmur.    Abdomen:  Soft, nontender, nondistended.   Extremities: No cyanosis or clubbing. No edema.    Musculoskeletal: Moves all extremities.  Gait normal.   Skin: Warm, dry, intact.    Neurologic: Mood and affect are appropriate.  " Alert and oriented to person, place, time, and situation.          Medications  Allergies   Current Outpatient Medications   Medication Sig Dispense Refill     acyclovir (ZOVIRAX) 400 MG tablet Take 400 mg by mouth daily as needed       albuterol (PROAIR HFA/PROVENTIL HFA/VENTOLIN HFA) 108 (90 Base) MCG/ACT inhaler Inhale 2 puffs into the lungs every 6 hours       amiodarone (PACERONE) 200 MG tablet Take 1 tablet (200 mg) by mouth daily for 30 days 30 tablet 0     apixaban ANTICOAGULANT (ELIQUIS) 5 MG tablet Take 1 tablet (5 mg) by mouth 2 times daily for 37 days 74 tablet 0     aspirin (ASA) 81 MG chewable tablet Take 1 tablet (81 mg) by mouth daily (Patient not taking: Reported on 9/30/2022) 60 tablet 3     Ergocalciferol 50 MCG (2000 UT) TABS 50 mcg every morning       ezetimibe (ZETIA) 10 MG tablet Take 10 mg by mouth daily       fish oil-omega-3 fatty acids 1000 MG capsule Take 2 g by mouth every morning (Patient not taking: Reported on 9/30/2022)       furosemide (LASIX) 40 MG tablet Take 40 mg PO BID for 1-3 days, then resume 40 mg PO daily 30 tablet 0     furosemide (LASIX) 40 MG tablet Take 1 tablet (40 mg) by mouth daily for 7 days 7 tablet 0     gabapentin (NEURONTIN) 100 MG capsule 200 mg At Bedtime       hydrochlorothiazide (HYDRODIURIL) 50 MG tablet Take 0.5 tablets (25 mg) by mouth every morning 30 tablet 3     lisinopril (ZESTRIL) 5 MG tablet Take 1 tablet (5 mg) by mouth daily 60 tablet 0     magnesium 100 MG CAPS 200 mg At Bedtime       methocarbamol (ROBAXIN) 500 MG tablet Take 2 tablets (1,000 mg) by mouth every 6 hours as needed for muscle spasms or other (pain) 100 tablet 0     metoprolol succinate ER (TOPROL XL) 25 MG 24 hr tablet Take 25 mg by mouth every morning       mupirocin (BACTROBAN) 2 % external ointment as needed       nitroGLYcerin (NITROSTAT) 0.4 MG sublingual tablet 0.4 mg every 5 minutes as needed       nystatin (MYCOSTATIN) 209672 UNIT/ML suspension 5 ml swish and swallow or  swish and spit 4 times per day for 10 days       omeprazole (PRILOSEC) 20 MG DR capsule 20 mg At Bedtime       oxyCODONE (ROXICODONE) 5 MG tablet Take 1 tablet (5 mg) by mouth every 6 hours as needed for moderate to severe pain 15 tablet 0     PARoxetine (PAXIL) 20 MG tablet Take 20 mg by mouth every morning       polyethylene glycol (MIRALAX) 17 GM/Dose powder Take 17 g by mouth daily as needed for constipation (Patient not taking: No sig reported) 510 g 0     potassium chloride ER (K-TAB) 20 MEQ CR tablet Take Once or Twice daily to match lasix dose 30 tablet 0     pravastatin (PRAVACHOL) 10 MG tablet Take 10 mg by mouth every morning (Patient not taking: No sig reported)       senna-docusate (SENOKOT-S/PERICOLACE) 8.6-50 MG tablet 1 tablet by Oral or Feeding Tube route 2 times daily as needed for constipation 60 tablet 0     sodium fluoride dental gel (PREVIDENT) 1.1 % GEL topical gel Brush 2x/day.  Do not eat or drink for 30 minutes after.       sulfamethoxazole-trimethoprim (BACTRIM) 400-80 MG tablet Take 1 tablet by mouth 2 times daily (Patient not taking: Reported on 9/30/2022) 14 tablet 0     zolpidem (AMBIEN) 5 MG tablet Take 0.5 tablets (2.5 mg) by mouth nightly as needed for sleep 10 tablet 0    Allergies   Allergen Reactions     Atorvastatin      Kiwi      Latex      Other Drug Allergy (See Comments)      X ray dye and shingrex vaccine          Lab Results (Personally Reviewed)    Chemistry/lipid CBC Cardiac Enzymes/BNP/TSH/INR   Lab Results   Component Value Date    BUN 13.4 09/21/2022     09/21/2022    CO2 24 09/21/2022     Creatinine   Date Value Ref Range Status   09/21/2022 0.61 0.51 - 0.95 mg/dL Final       No results found for: CHOL, HDL, LDL, CHOLHDL   Lab Results   Component Value Date    WBC 10.2 09/21/2022    HGB 8.5 (L) 09/21/2022    HCT 26.4 (L) 09/21/2022    MCV 93 09/21/2022     09/21/2022    Lab Results   Component Value Date    TSH 1.89 09/20/2022    INR 1.04 09/20/2022         The patient states understanding and is agreeable with the plan.   Elmer López MD [facility]  Cardiology - Electrophysiology      Total time spent on patient visit, reviewing notes, imaging, labs, orders, and completing necessary documentation: 45 minutes.

## 2022-10-05 NOTE — PATIENT INSTRUCTIONS
Plan:   Stop amiodarone in 1 month  Increase to lisinopril 10mg daily  Decrease lasix to 20mg daily  Establish care with general cardiology (Dr Doshi) in 3 months  See Lawanda Echevarria NP in 3 months with a 14 day ziopatch worn 1 month prior (mail out).      Your Care Team:  EP Cardiology   Telephone Number     Neha Weller RN [phone]    After business hours: [phone], ask for cardiologist on-call   Non-procedure scheduling:    Tamar ALEXANDRE   [phone]   Procedure scheduling:    Latonya Magallon   [phone]   Device Clinic (Pacemakers, ICDs, Loop Recorders)    During business hours: [phone]  After business hours:   [phone]- select option 4 and ask for job code [id number].       Cardiovascular Clinic:   [address] [place] [address]. [place] [address]      As always, Thank you for trusting us with your health care needs!

## 2022-10-06 LAB
ATRIAL RATE - MUSE: 64 BPM
DIASTOLIC BLOOD PRESSURE - MUSE: NORMAL MMHG
INTERPRETATION ECG - MUSE: NORMAL
P AXIS - MUSE: 65 DEGREES
PR INTERVAL - MUSE: 174 MS
QRS DURATION - MUSE: 84 MS
QT - MUSE: 434 MS
QTC - MUSE: 447 MS
R AXIS - MUSE: 58 DEGREES
SYSTOLIC BLOOD PRESSURE - MUSE: NORMAL MMHG
T AXIS - MUSE: 75 DEGREES
VENTRICULAR RATE- MUSE: 64 BPM

## 2022-10-07 ENCOUNTER — TELEPHONE (OUTPATIENT)
Dept: CARDIOLOGY | Facility: CLINIC | Age: 72
End: 2022-10-07

## 2022-10-17 ENCOUNTER — LAB (OUTPATIENT)
Dept: LAB | Facility: CLINIC | Age: 72
End: 2022-10-17
Payer: COMMERCIAL

## 2022-10-17 ENCOUNTER — ANCILLARY PROCEDURE (OUTPATIENT)
Dept: GENERAL RADIOLOGY | Facility: CLINIC | Age: 72
End: 2022-10-17
Attending: NURSE PRACTITIONER
Payer: COMMERCIAL

## 2022-10-17 ENCOUNTER — OFFICE VISIT (OUTPATIENT)
Dept: CARDIOLOGY | Facility: CLINIC | Age: 72
End: 2022-10-17
Attending: NURSE PRACTITIONER
Payer: COMMERCIAL

## 2022-10-17 VITALS
WEIGHT: 176.8 LBS | OXYGEN SATURATION: 97 % | BODY MASS INDEX: 27.75 KG/M2 | DIASTOLIC BLOOD PRESSURE: 65 MMHG | HEIGHT: 67 IN | HEART RATE: 61 BPM | SYSTOLIC BLOOD PRESSURE: 107 MMHG

## 2022-10-17 DIAGNOSIS — Z87.09 HISTORY OF PLEURAL EFFUSION: ICD-10-CM

## 2022-10-17 DIAGNOSIS — R06.02 SHORTNESS OF BREATH: Primary | ICD-10-CM

## 2022-10-17 DIAGNOSIS — R06.02 SHORTNESS OF BREATH: ICD-10-CM

## 2022-10-17 DIAGNOSIS — I48.0 PAF (PAROXYSMAL ATRIAL FIBRILLATION) (H): ICD-10-CM

## 2022-10-17 LAB
ANION GAP SERPL CALCULATED.3IONS-SCNC: 11 MMOL/L (ref 7–15)
BUN SERPL-MCNC: 22.6 MG/DL (ref 8–23)
CALCIUM SERPL-MCNC: 9.5 MG/DL (ref 8.8–10.2)
CHLORIDE SERPL-SCNC: 98 MMOL/L (ref 98–107)
CREAT SERPL-MCNC: 0.7 MG/DL (ref 0.51–0.95)
DEPRECATED HCO3 PLAS-SCNC: 25 MMOL/L (ref 22–29)
ERYTHROCYTE [DISTWIDTH] IN BLOOD BY AUTOMATED COUNT: 13.9 % (ref 10–15)
GFR SERPL CREATININE-BSD FRML MDRD: >90 ML/MIN/1.73M2
GLUCOSE SERPL-MCNC: 111 MG/DL (ref 70–99)
HCT VFR BLD AUTO: 39.4 % (ref 35–47)
HGB BLD-MCNC: 12.4 G/DL (ref 11.7–15.7)
MCH RBC QN AUTO: 27.7 PG (ref 26.5–33)
MCHC RBC AUTO-ENTMCNC: 31.5 G/DL (ref 31.5–36.5)
MCV RBC AUTO: 88 FL (ref 78–100)
NT-PROBNP SERPL-MCNC: 401 PG/ML (ref 0–900)
PLATELET # BLD AUTO: 468 10E3/UL (ref 150–450)
POTASSIUM SERPL-SCNC: 4.6 MMOL/L (ref 3.4–5.3)
RBC # BLD AUTO: 4.47 10E6/UL (ref 3.8–5.2)
SODIUM SERPL-SCNC: 134 MMOL/L (ref 136–145)
WBC # BLD AUTO: 9.5 10E3/UL (ref 4–11)

## 2022-10-17 PROCEDURE — 80048 BASIC METABOLIC PNL TOTAL CA: CPT | Performed by: PATHOLOGY

## 2022-10-17 PROCEDURE — 99214 OFFICE O/P EST MOD 30 MIN: CPT | Performed by: NURSE PRACTITIONER

## 2022-10-17 PROCEDURE — 85027 COMPLETE CBC AUTOMATED: CPT | Performed by: PATHOLOGY

## 2022-10-17 PROCEDURE — 93005 ELECTROCARDIOGRAM TRACING: CPT

## 2022-10-17 PROCEDURE — 71046 X-RAY EXAM CHEST 2 VIEWS: CPT | Mod: GC | Performed by: RADIOLOGY

## 2022-10-17 PROCEDURE — 36415 COLL VENOUS BLD VENIPUNCTURE: CPT | Performed by: PATHOLOGY

## 2022-10-17 PROCEDURE — G0463 HOSPITAL OUTPT CLINIC VISIT: HCPCS | Mod: 25

## 2022-10-17 PROCEDURE — 83880 ASSAY OF NATRIURETIC PEPTIDE: CPT | Performed by: PATHOLOGY

## 2022-10-17 RX ORDER — NYSTATIN 100000/ML
SUSPENSION, ORAL (FINAL DOSE FORM) ORAL
COMMUNITY
Start: 2022-10-08

## 2022-10-17 ASSESSMENT — PAIN SCALES - GENERAL: PAINLEVEL: NO PAIN (0)

## 2022-10-17 NOTE — PROGRESS NOTES
"Cardiology Clinic Note  October 17, 2022      HPI:  Luis Alberto Garcia is a 72 year old who presents for follow-up of SOB. She has a past medical history significant for HTN, HLD with statin intolerance on Zetia, tobacco use?, mild to moderate aortic stenosis and regurgitation, CAD s/p CABG X5 (LIMA-Diag, rSVG to LAD, PDA, AILIN, OM) 9/14/22 with Dr. Pathak. She did well overall after surgery but was readmitted 9/20/22 for A-fib with RVR which self converted. She was started on amiodarone and discharged on Holter monitor for 7 days which showed 9% a-fib burden. She was not initially started on anticoagulation but was later started on Eliquis. She also had a left pleural effusion and underwent thoracentesis on 9/23 with 500 mL removed. She was last evaluated by Dr. López 10/5/22 at which time she reported continued brief episodes of a-fib. Recommended that she stop amiodarone in 1 month and follow-up in 3 months with Shabbir prior to determine if long term anticoagulation and/or AAT were needed.     Patient says that over the weekend she had nausea/vomiting and diarrhea. She has also been feeling more short of breath than normal. She says her GI symptoms have resolved but she feels continues to feel a \"fullness\" in her chest and is more short of breath than normal. She says that it is hard to take a deep breath in, feels like there is a flap in her throat that opens and closes. Nothing precipitates her symptoms, they just come on randomly. She is not having any symptoms reminiscent of her prior angina which she describes neck and jaw pain. She denies any CHf symptoms such as orthopnea, PND, leg swelling or weight gain. She has no fever, chills, sputum production. She does have a history of asthma, hasn't used her inhaler since surgery. She also has GERD, but this feels different. She has occasional palpitations, no lightheadedness or syncope. Other cardiac medications include Eliquis, amiodarone, metoprolol, lasix 20 mg " daily, lisinopril 10 mg and hydrochlorothiazide 12.5 mg daily.     Past medical history:  Past Medical History:   Diagnosis Date     Anxiety      Centrilobular emphysema (H)      Childhood asthma      Coronary artery disease involving native coronary artery of native heart      Esophageal reflux      Essential hypertension      Fatty liver disease      Hair loss      Migraine      Mild aortic stenosis      Mixed hyperlipidemia      MRSA infection     Skin abscesses after COVID vaccinations     Osteoarthritis      Primary insomnia      Medications:  acyclovir (ZOVIRAX) 400 MG tablet, Take 400 mg by mouth daily as needed  albuterol (PROAIR HFA/PROVENTIL HFA/VENTOLIN HFA) 108 (90 Base) MCG/ACT inhaler, Inhale 2 puffs into the lungs every 6 hours  amiodarone (PACERONE) 200 MG tablet, Take 1 tablet (200 mg) by mouth daily for 30 days  apixaban ANTICOAGULANT (ELIQUIS) 5 MG tablet, Take 1 tablet (5 mg) by mouth 2 times daily for 37 days  aspirin (ASA) 81 MG chewable tablet, Take 1 tablet (81 mg) by mouth daily (Patient not taking: No sig reported)  Ergocalciferol 50 MCG (2000 UT) TABS, 50 mcg every morning  ezetimibe (ZETIA) 10 MG tablet, Take 10 mg by mouth daily  fish oil-omega-3 fatty acids 1000 MG capsule, Take 2 g by mouth every morning (Patient not taking: No sig reported)  furosemide (LASIX) 20 MG tablet, Take 1 tablet (20 mg) by mouth daily  gabapentin (NEURONTIN) 100 MG capsule, 200 mg At Bedtime  hydrochlorothiazide (HYDRODIURIL) 50 MG tablet, Take 0.5 tablets (25 mg) by mouth every morning  lisinopril (ZESTRIL) 10 MG tablet, Take 1 tablet (10 mg) by mouth daily  magnesium 100 MG CAPS, 200 mg At Bedtime  methocarbamol (ROBAXIN) 500 MG tablet, Take 2 tablets (1,000 mg) by mouth every 6 hours as needed for muscle spasms or other (pain)  metoprolol succinate ER (TOPROL XL) 25 MG 24 hr tablet, Take 25 mg by mouth every morning  mupirocin (BACTROBAN) 2 % external ointment, as needed  nitroGLYcerin (NITROSTAT) 0.4 MG  sublingual tablet, 0.4 mg every 5 minutes as needed  omeprazole (PRILOSEC) 20 MG DR capsule, 20 mg At Bedtime  oxyCODONE (ROXICODONE) 5 MG tablet, Take 1 tablet (5 mg) by mouth every 6 hours as needed for moderate to severe pain  PARoxetine (PAXIL) 20 MG tablet, Take 20 mg by mouth every morning  potassium chloride ER (K-TAB) 20 MEQ CR tablet, Take Once or Twice daily to match lasix dose (Patient not taking: Reported on 10/5/2022)  potassium chloride ER (KLOR-CON M) 20 MEQ CR tablet, Take 20 mEq by mouth 2 times daily  pravastatin (PRAVACHOL) 10 MG tablet, Take 10 mg by mouth every morning (Patient not taking: No sig reported)  senna-docusate (SENOKOT-S/PERICOLACE) 8.6-50 MG tablet, 1 tablet by Oral or Feeding Tube route 2 times daily as needed for constipation  sodium fluoride dental gel (PREVIDENT) 1.1 % GEL topical gel, Brush 2x/day.  Do not eat or drink for 30 minutes after.  sulfamethoxazole-trimethoprim (BACTRIM) 400-80 MG tablet, Take 1 tablet by mouth 2 times daily (Patient not taking: No sig reported)  zolpidem (AMBIEN) 5 MG tablet, Take 0.5 tablets (2.5 mg) by mouth nightly as needed for sleep  [DISCONTINUED] amLODIPine (NORVASC) 5 MG tablet, Take 5 mg by mouth every morning  [DISCONTINUED] celecoxib (CELEBREX) 200 MG capsule, Take 200 mg by mouth every morning  [DISCONTINUED] spironolactone (ALDACTONE) 50 MG tablet, 100 mg every morning    No current facility-administered medications on file prior to visit.      Allergies:      Allergies   Allergen Reactions     Atorvastatin      Kiwi      Latex      Other Drug Allergy (See Comments)      X ray dye and shingrex vaccine        Family and social history:    Family History   Problem Relation Age of Onset     Lung Cancer Mother      LUNG DISEASE Father      Hypothyroidism Sister      Hypothyroidism Sister      Osteosarcoma Sister      Anesthesia Reaction No family hx of        Social History     Socioeconomic History     Marital status:      Spouse  "name: Not on file     Number of children: Not on file     Years of education: Not on file     Highest education level: Not on file   Occupational History     Not on file   Tobacco Use     Smoking status: Former     Packs/day: 1.00     Years: 25.00     Pack years: 25.00     Types: Cigarettes     Quit date: 1992     Years since quittin.7     Smokeless tobacco: Never   Substance and Sexual Activity     Alcohol use: Yes     Comment: socially     Drug use: Not on file     Sexual activity: Not on file   Other Topics Concern     Not on file   Social History Narrative     Not on file     Social Determinants of Health     Financial Resource Strain: Not on file   Food Insecurity: Not on file   Transportation Needs: Not on file   Physical Activity: Not on file   Stress: Not on file   Social Connections: Not on file   Intimate Partner Violence: Not on file   Housing Stability: Not on file         Review of Systems:  Skin: No skin rash or ulcers.  Respiratory: No cough or hemoptysis.  Cardiovascular: See HPI.    Gastrointestinal: No abdominal pain, nausea, vomiting, hematemesis or melena.  Genitourinary: No increased frequency or urgency of urine. No dysuria or hematuria.  Musculoskeletal: No polyarthralgia or myalgias.  Neurologic: No headaches, seizure or focal weakness.  Hematologic/Lymphatic/Immunologic: No bleeding tendency.    Vital signs:  /65 (BP Location: Right arm, Patient Position: Chair, Cuff Size: Adult Regular)   Pulse 61   Ht 1.695 m (5' 6.73\")   Wt 80.2 kg (176 lb 12.8 oz)   SpO2 97%   BMI 27.91 kg/m     Wt Readings from Last 2 Encounters:   10/05/22 83.1 kg (183 lb 1.6 oz)   22 86.4 kg (190 lb 8 oz)       Physical Exam:  Gen: NAD.    HEENT: No conjunctival pallor or scleral icterus, MMM. Clear oropharynx.    Neck: No JVD. No thyroid enlargement or cervical adenopathy.    Chest: Clear to auscultation bilaterally.    CV: Normal first and second heart sounds. No murmurs or gallop " appreciated.    Abdomen: Soft, non-tender, non-distended, BS+.  Ext: No edema. Warm and well perfused with normal capillary refill.    Skin: No skin rash or ulcers.  Neuro: alert, oriented and appropriately conversant.    Psych: Normal affect and speech.    Labs:  Last Basic Metabolic Panel:  Lab Results   Component Value Date     09/21/2022      Lab Results   Component Value Date    POTASSIUM 3.9 09/21/2022    POTASSIUM 4.1 09/14/2022     Lab Results   Component Value Date    CHLORIDE 101 09/21/2022     Lab Results   Component Value Date    ALYCE 8.7 09/21/2022     Lab Results   Component Value Date    CO2 24 09/21/2022     Lab Results   Component Value Date    BUN 13.4 09/21/2022     Lab Results   Component Value Date    CR 0.61 09/21/2022     Lab Results   Component Value Date     09/21/2022     09/16/2022       Lab Results   Component Value Date    WBC 10.2 09/21/2022     Lab Results   Component Value Date    RBC 2.84 09/21/2022     Lab Results   Component Value Date    HGB 8.5 09/21/2022     Lab Results   Component Value Date    HCT 26.4 09/21/2022     Lab Results   Component Value Date    MCV 93 09/21/2022     Lab Results   Component Value Date    MCH 29.9 09/21/2022     Lab Results   Component Value Date    MCHC 32.2 09/21/2022     Lab Results   Component Value Date    RDW 15.0 09/21/2022     Lab Results   Component Value Date     09/21/2022       Lab Results   Component Value Date    INR 1.04 09/20/2022    INR 1.30 09/14/2022    INR 1.41 09/14/2022    INR 1.48 09/14/2022    INR 1.08 09/08/2022         Diagnostics:    EKG today:   NSR no ischemia       CXR today:  ADDITIONAL HISTORY: 72 year old?female patient s/p CABG X5 9/14/22?and  readmitted?9/20/22?for A-fib with RVR?which self?converted with later  returned for a thoracentesis on 9/23.     COMPARISON: Chest radiograph 9/30/2022     TECHNIQUE: Chest radiograph PA and lateral views.     FINDINGS:   Devices, lines, tubes:  Postsurgical median sternotomy wires appear  stable and intact. Scattered postsurgical clips along mediastinum.     Trachea is midline. Normal cardiac silhouette. No acute airspace  disease. Mild aortic calcification. No pneumothorax. No pleural  effusion. Visualized upper abdomen and bones are unremarkable.                                                                       IMPRESSION: Resolution of previous small to moderate left pleural  fluid. No acute cardiopulmonary findings.    Assessment and Plan:  Luis Alberto Garcia is a 72 year old who presents for follow-up of SOB. She has a past medical history significant for HTN, HLD with statin intolerance on Zetia, tobacco use?, mild to moderate aortic stenosis and regurgitation, CAD s/p CABG X5 (LIMA-Diag, rSVG to LAD, PDA, AILIN, OM) 9/14/22 with Dr. Pathak. She did well overall after surgery but was readmitted 9/20/22 for A-fib with RVR which self converted. She was started on amiodarone and discharged on Holter monitor for 7 days which showed 9% a-fib burden. She was not initially started on anticoagulation but was later started on Eliquis. She also had a left pleural effusion and underwent thoracentesis on 9/23 with 500 mL removed. She was last evaluated by Dr. López 10/5/22 at which time she reported continued brief episodes of a-fib. Recommended that she stop amiodarone in 1 month and follow-up in 3 months with Shabbir prior to determine if long term anticoagulation and/or AAT were needed.     Patient reports a GI illness over the weekend, now experiencing chest fullness, shortness of breath with deep inspiration. Differential includes: CAD, CHF, AF, pneumonia, GI or asthma. Symptoms are different than prior angina and ECG without ischemia, no s/s of heart failure, CXR clear and NT-BNP normal, ECG with NSR, no infectious symptoms and CXR without infiltrate. Suspect GI or asthma as possible etiology. Recommended patient use inhaler and follow-up with PCP.        Follow-up: Follow up with EP  in 3 months with zio patch completed prior.       A total of 30 minutes spent face to face with patient and > 50% of the time spent counseling and coordinating care.

## 2022-10-17 NOTE — PATIENT INSTRUCTIONS
You were seen today in the Cardiovascular Clinic at the [facility].    Cardiology provider you saw during your visit Daiana Thompson NP.    Continue current medications.   2.  Sounds like your symptoms are GI or asthma but to rule out cardiac cause I would recommend CXR and labs.   3. Please make a follow-up appointment with Dr. López in 2 months, or sooner if above testing is abnormal.     Questions and [phone].   First press #1 for the University and then press #3 for Medical Questions to reach the Cardiology triage nurse.     On Call Cardiologist for after hours or on weekends: [phone], press option #4 and ask to speak to the on-call Cardiologist.

## 2022-10-17 NOTE — NURSING NOTE
Chief Complaint   Patient presents with     Follow Up     Breathing problems         Vitals were taken, medications reconciled and EKG performed.     Jerome Randall, EMT   2:37 PM

## 2022-10-17 NOTE — LETTER
"10/17/2022      RE: Luis Alberto Garcia  [address] [place] [address]       Dear Colleague,    Thank you for the opportunity to participate in the care of your patient, Luis Alberto Garcia, at the [facility] [place] [facility]. Please see a copy of my visit note below.    Cardiology Clinic Note  October 17, 2022      HPI:  Luis Alberto Garcia is a 72 year old who presents for follow-up of SOB. She has a past medical history significant for HTN, HLD with statin intolerance on Zetia, tobacco use?, mild to moderate aortic stenosis and regurgitation, CAD s/p CABG X5 (LIMA-Diag, rSVG to LAD, PDA, AILIN, OM) 9/14/22 with Dr. Pathak. She did well overall after surgery but was readmitted 9/20/22 for A-fib with RVR which self converted. She was started on amiodarone and discharged on Holter monitor for 7 days which showed 9% a-fib burden. She was not initially started on anticoagulation but was later started on Eliquis. She also had a left pleural effusion and underwent thoracentesis on 9/23 with 500 mL removed. She was last evaluated by Dr. López 10/5/22 at which time she reported continued brief episodes of a-fib. Recommended that she stop amiodarone in 1 month and follow-up in 3 months with Shabbir prior to determine if long term anticoagulation and/or AAT were needed.     Patient says that over the weekend she had nausea/vomiting and diarrhea. She has also been feeling more short of breath than normal. She says her GI symptoms have resolved but she feels continues to feel a \"fullness\" in her chest and is more short of breath than normal. She says that it is hard to take a deep breath in, feels like there is a flap in her throat that opens and closes. Nothing precipitates her symptoms, they just come on randomly. She is not having any symptoms reminiscent of her prior angina which she describes neck and jaw pain. She denies any CHf symptoms such as orthopnea, " PND, leg swelling or weight gain. She has no fever, chills, sputum production. She does have a history of asthma, hasn't used her inhaler since surgery. She also has GERD, but this feels different. She has occasional palpitations, no lightheadedness or syncope. Other cardiac medications include Eliquis, amiodarone, metoprolol, lasix 20 mg daily, lisinopril 10 mg and hydrochlorothiazide 12.5 mg daily.     Past medical history:  Past Medical History:   Diagnosis Date     Anxiety      Centrilobular emphysema (H)      Childhood asthma      Coronary artery disease involving native coronary artery of native heart      Esophageal reflux      Essential hypertension      Fatty liver disease      Hair loss      Migraine      Mild aortic stenosis      Mixed hyperlipidemia      MRSA infection     Skin abscesses after COVID vaccinations     Osteoarthritis      Primary insomnia      Medications:  acyclovir (ZOVIRAX) 400 MG tablet, Take 400 mg by mouth daily as needed  albuterol (PROAIR HFA/PROVENTIL HFA/VENTOLIN HFA) 108 (90 Base) MCG/ACT inhaler, Inhale 2 puffs into the lungs every 6 hours  amiodarone (PACERONE) 200 MG tablet, Take 1 tablet (200 mg) by mouth daily for 30 days  apixaban ANTICOAGULANT (ELIQUIS) 5 MG tablet, Take 1 tablet (5 mg) by mouth 2 times daily for 37 days  aspirin (ASA) 81 MG chewable tablet, Take 1 tablet (81 mg) by mouth daily (Patient not taking: No sig reported)  Ergocalciferol 50 MCG (2000 UT) TABS, 50 mcg every morning  ezetimibe (ZETIA) 10 MG tablet, Take 10 mg by mouth daily  fish oil-omega-3 fatty acids 1000 MG capsule, Take 2 g by mouth every morning (Patient not taking: No sig reported)  furosemide (LASIX) 20 MG tablet, Take 1 tablet (20 mg) by mouth daily  gabapentin (NEURONTIN) 100 MG capsule, 200 mg At Bedtime  hydrochlorothiazide (HYDRODIURIL) 50 MG tablet, Take 0.5 tablets (25 mg) by mouth every morning  lisinopril (ZESTRIL) 10 MG tablet, Take 1 tablet (10 mg) by mouth daily  magnesium 100  MG CAPS, 200 mg At Bedtime  methocarbamol (ROBAXIN) 500 MG tablet, Take 2 tablets (1,000 mg) by mouth every 6 hours as needed for muscle spasms or other (pain)  metoprolol succinate ER (TOPROL XL) 25 MG 24 hr tablet, Take 25 mg by mouth every morning  mupirocin (BACTROBAN) 2 % external ointment, as needed  nitroGLYcerin (NITROSTAT) 0.4 MG sublingual tablet, 0.4 mg every 5 minutes as needed  omeprazole (PRILOSEC) 20 MG DR capsule, 20 mg At Bedtime  oxyCODONE (ROXICODONE) 5 MG tablet, Take 1 tablet (5 mg) by mouth every 6 hours as needed for moderate to severe pain  PARoxetine (PAXIL) 20 MG tablet, Take 20 mg by mouth every morning  potassium chloride ER (K-TAB) 20 MEQ CR tablet, Take Once or Twice daily to match lasix dose (Patient not taking: Reported on 10/5/2022)  potassium chloride ER (KLOR-CON M) 20 MEQ CR tablet, Take 20 mEq by mouth 2 times daily  pravastatin (PRAVACHOL) 10 MG tablet, Take 10 mg by mouth every morning (Patient not taking: No sig reported)  senna-docusate (SENOKOT-S/PERICOLACE) 8.6-50 MG tablet, 1 tablet by Oral or Feeding Tube route 2 times daily as needed for constipation  sodium fluoride dental gel (PREVIDENT) 1.1 % GEL topical gel, Brush 2x/day.  Do not eat or drink for 30 minutes after.  sulfamethoxazole-trimethoprim (BACTRIM) 400-80 MG tablet, Take 1 tablet by mouth 2 times daily (Patient not taking: No sig reported)  zolpidem (AMBIEN) 5 MG tablet, Take 0.5 tablets (2.5 mg) by mouth nightly as needed for sleep  [DISCONTINUED] amLODIPine (NORVASC) 5 MG tablet, Take 5 mg by mouth every morning  [DISCONTINUED] celecoxib (CELEBREX) 200 MG capsule, Take 200 mg by mouth every morning  [DISCONTINUED] spironolactone (ALDACTONE) 50 MG tablet, 100 mg every morning    No current facility-administered medications on file prior to visit.      Allergies:      Allergies   Allergen Reactions     Atorvastatin      Kiwi      Latex      Other Drug Allergy (See Comments)      X ray dye and shingrex vaccine   "      Family and social history:    Family History   Problem Relation Age of Onset     Lung Cancer Mother      LUNG DISEASE Father      Hypothyroidism Sister      Hypothyroidism Sister      Osteosarcoma Sister      Anesthesia Reaction No family hx of        Social History     Socioeconomic History     Marital status:      Spouse name: Not on file     Number of children: Not on file     Years of education: Not on file     Highest education level: Not on file   Occupational History     Not on file   Tobacco Use     Smoking status: Former     Packs/day: 1.00     Years: 25.00     Pack years: 25.00     Types: Cigarettes     Quit date: 1992     Years since quittin.7     Smokeless tobacco: Never   Substance and Sexual Activity     Alcohol use: Yes     Comment: socially     Drug use: Not on file     Sexual activity: Not on file   Other Topics Concern     Not on file   Social History Narrative     Not on file     Social Determinants of Health     Financial Resource Strain: Not on file   Food Insecurity: Not on file   Transportation Needs: Not on file   Physical Activity: Not on file   Stress: Not on file   Social Connections: Not on file   Intimate Partner Violence: Not on file   Housing Stability: Not on file         Review of Systems:  Skin: No skin rash or ulcers.  Respiratory: No cough or hemoptysis.  Cardiovascular: See HPI.    Gastrointestinal: No abdominal pain, nausea, vomiting, hematemesis or melena.  Genitourinary: No increased frequency or urgency of urine. No dysuria or hematuria.  Musculoskeletal: No polyarthralgia or myalgias.  Neurologic: No headaches, seizure or focal weakness.  Hematologic/Lymphatic/Immunologic: No bleeding tendency.    Vital signs:  /65 (BP Location: Right arm, Patient Position: Chair, Cuff Size: Adult Regular)   Pulse 61   Ht 1.695 m (5' 6.73\")   Wt 80.2 kg (176 lb 12.8 oz)   SpO2 97%   BMI 27.91 kg/m     Wt Readings from Last 2 Encounters:   10/05/22 83.1 kg " (183 lb 1.6 oz)   09/30/22 86.4 kg (190 lb 8 oz)       Physical Exam:  Gen: NAD.    HEENT: No conjunctival pallor or scleral icterus, MMM. Clear oropharynx.    Neck: No JVD. No thyroid enlargement or cervical adenopathy.    Chest: Clear to auscultation bilaterally.    CV: Normal first and second heart sounds. No murmurs or gallop appreciated.    Abdomen: Soft, non-tender, non-distended, BS+.  Ext: No edema. Warm and well perfused with normal capillary refill.    Skin: No skin rash or ulcers.  Neuro: alert, oriented and appropriately conversant.    Psych: Normal affect and speech.    Labs:  Last Basic Metabolic Panel:  Lab Results   Component Value Date     09/21/2022      Lab Results   Component Value Date    POTASSIUM 3.9 09/21/2022    POTASSIUM 4.1 09/14/2022     Lab Results   Component Value Date    CHLORIDE 101 09/21/2022     Lab Results   Component Value Date    ALYCE 8.7 09/21/2022     Lab Results   Component Value Date    CO2 24 09/21/2022     Lab Results   Component Value Date    BUN 13.4 09/21/2022     Lab Results   Component Value Date    CR 0.61 09/21/2022     Lab Results   Component Value Date     09/21/2022     09/16/2022       Lab Results   Component Value Date    WBC 10.2 09/21/2022     Lab Results   Component Value Date    RBC 2.84 09/21/2022     Lab Results   Component Value Date    HGB 8.5 09/21/2022     Lab Results   Component Value Date    HCT 26.4 09/21/2022     Lab Results   Component Value Date    MCV 93 09/21/2022     Lab Results   Component Value Date    MCH 29.9 09/21/2022     Lab Results   Component Value Date    MCHC 32.2 09/21/2022     Lab Results   Component Value Date    RDW 15.0 09/21/2022     Lab Results   Component Value Date     09/21/2022       Lab Results   Component Value Date    INR 1.04 09/20/2022    INR 1.30 09/14/2022    INR 1.41 09/14/2022    INR 1.48 09/14/2022    INR 1.08 09/08/2022         Diagnostics:    EKG today:   NSR no ischemia       CXR  today:  ADDITIONAL HISTORY: 72 year old?female patient s/p CABG X5 9/14/22?and  readmitted?9/20/22?for A-fib with RVR?which self?converted with later  returned for a thoracentesis on 9/23.     COMPARISON: Chest radiograph 9/30/2022     TECHNIQUE: Chest radiograph PA and lateral views.     FINDINGS:   Devices, lines, tubes: Postsurgical median sternotomy wires appear  stable and intact. Scattered postsurgical clips along mediastinum.     Trachea is midline. Normal cardiac silhouette. No acute airspace  disease. Mild aortic calcification. No pneumothorax. No pleural  effusion. Visualized upper abdomen and bones are unremarkable.                                                                       IMPRESSION: Resolution of previous small to moderate left pleural  fluid. No acute cardiopulmonary findings.    Assessment and Plan:  Luis Alberto Garcia is a 72 year old who presents for follow-up of SOB. She has a past medical history significant for HTN, HLD with statin intolerance on Zetia, tobacco use?, mild to moderate aortic stenosis and regurgitation, CAD s/p CABG X5 (LIMA-Diag, rSVG to LAD, PDA, AILIN, OM) 9/14/22 with Dr. Pathak. She did well overall after surgery but was readmitted 9/20/22 for A-fib with RVR which self converted. She was started on amiodarone and discharged on Holter monitor for 7 days which showed 9% a-fib burden. She was not initially started on anticoagulation but was later started on Eliquis. She also had a left pleural effusion and underwent thoracentesis on 9/23 with 500 mL removed. She was last evaluated by Dr. López 10/5/22 at which time she reported continued brief episodes of a-fib. Recommended that she stop amiodarone in 1 month and follow-up in 3 months with Shabbir prior to determine if long term anticoagulation and/or AAT were needed.     Patient reports a GI illness over the weekend, now experiencing chest fullness, shortness of breath with deep inspiration. Differential includes: CAD,  CHF, AF, pneumonia, GI or asthma. Symptoms are different than prior angina and ECG without ischemia, no s/s of heart failure, CXR clear and NT-BNP normal, ECG with NSR, no infectious symptoms and CXR without infiltrate. Suspect GI or asthma as possible etiology. Recommended patient use inhaler and follow-up with PCP.       Follow-up: Follow up with EP  in 3 months with zio patch completed prior.       A total of 30 minutes spent face to face with patient and > 50% of the time spent counseling and coordinating care.         Sincerely,     Daiana Thompson NP

## 2022-10-18 LAB
BACTERIA SPT CULT: ABNORMAL
BACTERIA SPT CULT: ABNORMAL
GRAM STAIN RESULT: ABNORMAL

## 2022-10-19 LAB
ATRIAL RATE - MUSE: 61 BPM
DIASTOLIC BLOOD PRESSURE - MUSE: NORMAL MMHG
INTERPRETATION ECG - MUSE: NORMAL
P AXIS - MUSE: 69 DEGREES
PR INTERVAL - MUSE: 190 MS
QRS DURATION - MUSE: 82 MS
QT - MUSE: 414 MS
QTC - MUSE: 416 MS
R AXIS - MUSE: 68 DEGREES
SYSTOLIC BLOOD PRESSURE - MUSE: NORMAL MMHG
T AXIS - MUSE: 66 DEGREES
VENTRICULAR RATE- MUSE: 61 BPM

## 2022-11-07 ENCOUNTER — TELEPHONE (OUTPATIENT)
Dept: CARDIOLOGY | Facility: CLINIC | Age: 72
End: 2022-11-07

## 2022-11-07 NOTE — TELEPHONE ENCOUNTER
Health Call Center    Phone Message    May a detailed message be left on voicemail: yes     Reason for Call: Other: Patient called in and wanted to speak to a care team member about her blood pressure and possible medications. Patient is having a horrible headache, so she needs to speak to someone right away. She was scheduled to see Dudly but that was cancelled.  Please call to discuss.  Mychart message is requested.   Thank you.    Action Taken: Other: Cardio    Travel Screening: Not Applicable

## 2022-11-29 ENCOUNTER — TELEPHONE (OUTPATIENT)
Dept: CARDIOLOGY | Facility: CLINIC | Age: 72
End: 2022-11-29

## 2022-11-29 NOTE — TELEPHONE ENCOUNTER
Health Call Center    Phone Message    May a detailed message be left on voicemail: no     Reason for Call: Other: Luis Alberto called to speak with a member of the care team, she stated she had open heart surgery 10 weeks ago on 9/14/22 and would like clarification if she is now able to fly and is she able to receive a steroid injection in her neck? Please reach back out to Luis Alberto to clarify.      Action Taken: Message routed to:  [facility] ([facility]): Cardiology    Travel Screening: Not Applicable

## 2022-11-30 NOTE — TELEPHONE ENCOUNTER
Patient s/p CABG on 9/14 with Dr. Pathak, incisions healing well; had question regarding flying and having a steroid injection. Patient informed that from the cardiothoracic surgery viewpoint patient is able to fly and have the steroid injection. Patient verbalized understanding and agreed to the plan.

## 2022-12-02 ENCOUNTER — ALLIED HEALTH/NURSE VISIT (OUTPATIENT)
Dept: CARDIOLOGY | Facility: CLINIC | Age: 72
End: 2022-12-02
Attending: NURSE PRACTITIONER
Payer: COMMERCIAL

## 2022-12-02 DIAGNOSIS — I48.0 PAROXYSMAL ATRIAL FIBRILLATION (H): ICD-10-CM

## 2022-12-02 PROCEDURE — 93248 EXT ECG>7D<15D REV&INTERPJ: CPT | Performed by: INTERNAL MEDICINE

## 2023-01-02 NOTE — PROGRESS NOTES
ELECTROPHYSIOLOGY CLINIC VISIT    Assessment/Recommendations   Assessment/Plan:    Ms. Garcia is a 72 year old female who has a past medical history significant for CAD s/p CABG X5 (LIMA-Diag, rSVG to LAD, PDA, IALIN, OM) 9/14/22 , HTN, fatty liver, aortic regurgitation, GERD, emphysema, tobacco use, migraines, anxiety, and insomnia.     CAD s/p CABGX5 9/14/22:  1. Antiplatlet: ASA  2. Statin: Zetia  3. BB:  Toprol XL   4. ACEi/ARB: Lisinopril.   5. On lasix after surgery, decrease to Lasix 20 mg daily and can consider stopping if volume status remains good at follow-up.   6. Nitroglycerin 0.4 mg PRN for chest pain. Place 1 tablet (0.4 mg) under the tongue every 5 minutes as needed for chest pain. Maximum of 3 doses in 15 minutes.  7. Lifestyle: Avoid tobacco, maintain a healthy weight, limit alcohol, cardiac diet, and exercise.  8. Referral to establish with Dr. Doshi upcoming.    Post Operative Atrial Fibrillation:   1. Stroke Prophylaxis:  CHADSVASC=+CAD, +age, +gender, +HTN  4, corresponding to a 4.0% annual stroke / systemic emolism event rate. indicating need for long term oral anticoagulation.  She has been on Eliquis for post operative AF. Can stop now. If further AF spontaneously outside of post operative time then will need to do anticoagulation.   2. Rate Control: Continue Toprol XL.   3. Rhythm Control: Cardioversion, Antiarrhythmics and/or ablation are options for rhythm control. She developed post operative AF and self converted. She was given short course of amiodarone. A cardiac monitor showed 9% PAF burden. This is all occurring within first month after surgery. A follow up zio recently showed no further AF after further post operative healing. If further episodes of AF outside of acute post operative time, then would need to consider AAT and/or ablation as needed.     4. Risk Factor Management: BP control, and MARKO evaluation.        Follow up with EP on an as needed basis.        History of  Present Illness/Subjective    Ms. Luis Alberto Garcia is a 72 year old female who comes in today for EP consultation of post operative AF.    Ms. Garcia is a 72 year old female who has a past medical history significant for CAD s/p CABG X5 (LIMA-Diag, rSVG to LAD, PDA, AILIN, OM) 9/14/22 , HTN, fatty liver, aortic regurgitation, GERD, emphysema, tobacco use, migraines, anxiety, and insomnia.     She had been experiencing jaw/neck pain, chest pressure, and LOPEZ for last 10 months. He had a Lexiscan that showed normal perfusion but abnormal ECG. He then had a CT coronary that showed moderate coronary calcifications. Subsequent coronary angiogram showed severe multivessel CAD. He was referred to CVTS. He underwent CABGX5 on 9/14/22. She was discharged 9/19 POD#6. She then awoke 9/20 with acute palpitations, dyspnea at rest+exertion, radiating neck pain, and presyncope. At CVTS clinic, ECG illustrated AF with RVR in the 140s and sent to ER. No prior history of subjective or document arrythmia. Commented that she was febrile post-op, and endorsing feeling flushed. She was placed on IV Amio and heparin infusion. She spontaneously converted overnight. Echo negative for pericardial effusion with no acute changes and LVEF 60-65%. She will discharge to home with Diltiazem 60 mg q6h prn in setting of HR > 110, amiodarone, and on cardiac monitor. Bio[employer] cardiac monitor showed 9% PAF burden. She has She was referred to EP for further management.     EP Visit 10/5/22: She reports feeling OK. Her main problem is LOPEZ, variable with different levels of exertion, and feels it is worse in evenings. BPs at home running 130-140 range. She denies chest discomfort, palpitations, abdominal fullness/bloating or peripheral edema, paroxysmal nocturnal dyspnea, orthopnea, lightheadedness, dizziness, pre-syncope, or syncope. An echo today shows LVEF 65%. LFT and TFT from 9/2022 WDL. Presenting 12 lead ECG shows NSR Vent Rate 64 bpm,  ms, QRS 84  "ms, QTc 447 ms. Current cardiac medications include: Lasix, Amiodarone, Toprol XL, Eliquis, Lisinopril, ASA, and Zetia.     She presents today for follow up. She reports feeling well. She denies chest discomfort, palpitations, abdominal fullness/bloating or peripheral edema, shortness of breath, paroxysmal nocturnal dyspnea, orthopnea, lightheadedness, dizziness, pre-syncope, or syncope. A zio patch monitor from 11/29/22-12/1/22 showed 12 PSVT up to 13 beats, slightly blunted HRs in sinus max 100 bpm, and 17 symptom activations mostly showed sinus 60-70s with symptoms of SOB and palpitations/irregular heart beats. Presenting 12 lead ECG shows NSR Vent Rate 64 bpm,  ms, QRS 80 ms, QTc 429 ms. 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     I have reviewed and updated the patient's Past Medical History, Social History, Family History and Medication List.     Cardiographics (Personally Reviewed) :   9/21/22 Echo:   Interpretation Summary  Global and regional left ventricular function is normal with an EF of 60-65%.  Global right ventricular function is normal.  Mild to moderate AS and AI.  IVC diameter <2.1 cm collapsing >50% with sniff suggests a normal RA pressure  of 3 mmHg.  No pericardial effusion is present.  No significant changes noted.    TTE 10/5/2022:  Interpretation Summary  Global and regional left ventricular function is hyperkinetic with an EF of 65-70%.  Right ventricular function, chamber size, wall motion, and thickness are normal.  Mild to moderate aortic insufficiency is present.  Pulmonary artery systolic pressure is normal.  The inferior vena cava is normal.  No significant changes noted.       Physical Examination   /70 (BP Location: Right arm, Patient Position: Sitting, Cuff Size: Adult Regular)   Pulse 66   Ht 1.674 m (5' 5.91\")   Wt 82.1 kg " (180 lb 14.4 oz)   SpO2 96%   BMI 29.28 kg/m    Wt Readings from Last 3 Encounters:   10/17/22 80.2 kg (176 lb 12.8 oz)   10/05/22 83.1 kg (183 lb 1.6 oz)   09/30/22 86.4 kg (190 lb 8 oz)     General Appearance:   Alert, well-appearing and in no acute distress.   HEENT: Atraumatic, normocephalic. PERRL.  MMM.   Chest/Lungs:   Respirations unlabored.  Lungs are clear to auscultation.   Cardiovascular:   Regular rate and rhythm.  S1/S2. No murmur.    Abdomen:  Soft, nontender, nondistended.   Extremities: No cyanosis or clubbing. No edema.    Musculoskeletal: Moves all extremities.  Gait normal.   Skin: Warm, dry, intact.    Neurologic: Mood and affect are appropriate.  Alert and oriented to person, place, time, and situation.          Medications  Allergies   Current Outpatient Medications   Medication Sig Dispense Refill     acyclovir (ZOVIRAX) 400 MG tablet Take 400 mg by mouth daily as needed       albuterol (PROAIR HFA/PROVENTIL HFA/VENTOLIN HFA) 108 (90 Base) MCG/ACT inhaler Inhale 2 puffs into the lungs every 6 hours       aspirin (ASA) 81 MG chewable tablet Take 1 tablet (81 mg) by mouth daily (Patient not taking: No sig reported) 60 tablet 3     Ergocalciferol 50 MCG (2000 UT) TABS 50 mcg every morning       ezetimibe (ZETIA) 10 MG tablet Take 10 mg by mouth daily       fish oil-omega-3 fatty acids 1000 MG capsule Take 2 g by mouth every morning       furosemide (LASIX) 20 MG tablet Take 1 tablet (20 mg) by mouth daily 30 tablet 3     gabapentin (NEURONTIN) 100 MG capsule 200 mg At Bedtime       hydrochlorothiazide (HYDRODIURIL) 50 MG tablet Take 0.5 tablets (25 mg) by mouth every morning 30 tablet 3     lisinopril (ZESTRIL) 10 MG tablet Take 1 tablet (10 mg) by mouth daily 90 tablet 3     magnesium 100 MG CAPS 200 mg At Bedtime       methocarbamol (ROBAXIN) 500 MG tablet Take 2 tablets (1,000 mg) by mouth every 6 hours as needed for muscle spasms or other (pain) 100 tablet 0     metoprolol succinate ER  (TOPROL XL) 25 MG 24 hr tablet Take 25 mg by mouth every morning       mupirocin (BACTROBAN) 2 % external ointment as needed       nitroGLYcerin (NITROSTAT) 0.4 MG sublingual tablet 0.4 mg every 5 minutes as needed       nystatin (MYCOSTATIN) 928708 UNIT/ML suspension Swish and swallow or swish and spit 1 TEASPOONFUL 4 times per day for 10 days       omeprazole (PRILOSEC) 20 MG DR capsule 20 mg At Bedtime       oxyCODONE (ROXICODONE) 5 MG tablet Take 1 tablet (5 mg) by mouth every 6 hours as needed for moderate to severe pain (Patient not taking: Reported on 10/17/2022) 15 tablet 0     PARoxetine (PAXIL) 20 MG tablet Take 20 mg by mouth every morning       potassium chloride ER (K-TAB) 20 MEQ CR tablet Take Once or Twice daily to match lasix dose (Patient not taking: No sig reported) 30 tablet 0     potassium chloride ER (KLOR-CON M) 20 MEQ CR tablet Take 20 mEq by mouth 2 times daily (Patient not taking: Reported on 10/17/2022)       pravastatin (PRAVACHOL) 10 MG tablet Take 10 mg by mouth every morning (Patient not taking: No sig reported)       senna-docusate (SENOKOT-S/PERICOLACE) 8.6-50 MG tablet 1 tablet by Oral or Feeding Tube route 2 times daily as needed for constipation (Patient not taking: Reported on 10/17/2022) 60 tablet 0     sodium fluoride dental gel (PREVIDENT) 1.1 % GEL topical gel Brush 2x/day.  Do not eat or drink for 30 minutes after.       sulfamethoxazole-trimethoprim (BACTRIM) 400-80 MG tablet Take 1 tablet by mouth 2 times daily (Patient not taking: No sig reported) 14 tablet 0     zolpidem (AMBIEN) 5 MG tablet Take 0.5 tablets (2.5 mg) by mouth nightly as needed for sleep 10 tablet 0    Allergies   Allergen Reactions     Atorvastatin      Kiwi      Latex      Other Drug Allergy (See Comments)      X ray dye and shingrex vaccine          Lab Results (Personally Reviewed)    Chemistry/lipid CBC Cardiac Enzymes/BNP/TSH/INR   Lab Results   Component Value Date    BUN 22.6 10/17/2022      (L) 10/17/2022    CO2 25 10/17/2022     Creatinine   Date Value Ref Range Status   10/17/2022 0.70 0.51 - 0.95 mg/dL Final       No results found for: CHOL, HDL, LDL, CHOLHDL   Lab Results   Component Value Date    WBC 9.5 10/17/2022    HGB 12.4 10/17/2022    HCT 39.4 10/17/2022    MCV 88 10/17/2022     (H) 10/17/2022    Lab Results   Component Value Date    TSH 1.89 09/20/2022    INR 1.04 09/20/2022        The patient states understanding and is agreeable with the plan.   Lawanda Echevarria, APRN CNP  Electrophysiology Consult Service  Pager: [phone]

## 2023-01-06 ENCOUNTER — OFFICE VISIT (OUTPATIENT)
Dept: CARDIOLOGY | Facility: CLINIC | Age: 73
End: 2023-01-06
Attending: NURSE PRACTITIONER
Payer: COMMERCIAL

## 2023-01-06 VITALS
BODY MASS INDEX: 29.07 KG/M2 | HEIGHT: 66 IN | DIASTOLIC BLOOD PRESSURE: 70 MMHG | WEIGHT: 180.9 LBS | OXYGEN SATURATION: 96 % | HEART RATE: 66 BPM | SYSTOLIC BLOOD PRESSURE: 116 MMHG

## 2023-01-06 DIAGNOSIS — I48.91 ATRIAL FIBRILLATION, UNSPECIFIED TYPE (H): Primary | ICD-10-CM

## 2023-01-06 DIAGNOSIS — I48.0 PAROXYSMAL ATRIAL FIBRILLATION (H): ICD-10-CM

## 2023-01-06 PROCEDURE — 99213 OFFICE O/P EST LOW 20 MIN: CPT | Performed by: NURSE PRACTITIONER

## 2023-01-06 PROCEDURE — 93005 ELECTROCARDIOGRAM TRACING: CPT

## 2023-01-06 PROCEDURE — G0463 HOSPITAL OUTPT CLINIC VISIT: HCPCS | Mod: 25

## 2023-01-06 PROCEDURE — G0463 HOSPITAL OUTPT CLINIC VISIT: HCPCS | Performed by: NURSE PRACTITIONER

## 2023-01-06 RX ORDER — AMLODIPINE BESYLATE 2.5 MG/1
1 TABLET ORAL
COMMUNITY
Start: 2022-01-15 | End: 2024-04-04

## 2023-01-06 ASSESSMENT — PAIN SCALES - GENERAL: PAINLEVEL: NO PAIN (0)

## 2023-01-06 NOTE — PATIENT INSTRUCTIONS
You were seen in the Electrophysiology Clinic today by: Lawanda Alfaro NP    Plan:   Medication Changes:     Labs/Tests Needed:    Follow up Visit:    Further Instructions:      If you have further questions, please utilize [facility] to contact us.     Your Care Team:    EP Cardiology   Telephone Number     Nurse Line  Leann Flores, RN   Neha Weller, RN   [phone]     For scheduling appointments:   [facility]   [phone]   For procedure scheduling:    Latonya Magallon     [phone]   For the Device Clinic (Pacemakers, ICDs, Loop Recorders)    During business hours: [phone]  After business hours:   [phone]- select option 4 and ask for job code [id number].       On-call cardiologist for after hours or on weekends:   [phone], option #4, and ask to speak to the on-call cardiologist.     Cardiovascular Clinic:   [address] [place] [address]. [place] [address]      As always, Thank you for trusting us with your health care needs!

## 2023-01-06 NOTE — LETTER
1/6/2023      RE: Luis Alberto Garcia  [address] [place] [address]       Dear Colleague,    Thank you for the opportunity to participate in the care of your patient, Luis Alberto Garcia, at the [facility] [place] at [facility]. Please see a copy of my visit note below.        ELECTROPHYSIOLOGY CLINIC VISIT    Assessment/Recommendations   Assessment/Plan:    Ms. Garcia is a 72 year old female who has a past medical history significant for CAD s/p CABG X5 (LIMA-Diag, rSVG to LAD, PDA, AILIN, OM) 9/14/22 , HTN, fatty liver, aortic regurgitation, GERD, emphysema, tobacco use, migraines, anxiety, and insomnia.     CAD s/p CABGX5 9/14/22:  1. Antiplatlet: ASA  2. Statin: Zetia  3. BB:  Toprol XL   4. ACEi/ARB: Lisinopril.   5. On lasix after surgery, decrease to Lasix 20 mg daily and can consider stopping if volume status remains good at follow-up.   6. Nitroglycerin 0.4 mg PRN for chest pain. Place 1 tablet (0.4 mg) under the tongue every 5 minutes as needed for chest pain. Maximum of 3 doses in 15 minutes.  7. Lifestyle: Avoid tobacco, maintain a healthy weight, limit alcohol, cardiac diet, and exercise.  8. Referral to establish with Dr. Doshi upcoming.    Post Operative Atrial Fibrillation:   1. Stroke Prophylaxis:  CHADSVASC=+CAD, +age, +gender, +HTN  4, corresponding to a 4.0% annual stroke / systemic emolism event rate. indicating need for long term oral anticoagulation.  She has been on Eliquis for post operative AF. Can stop now. If further AF spontaneously outside of post operative time then will need to do anticoagulation.   2. Rate Control: Continue Toprol XL.   3. Rhythm Control: Cardioversion, Antiarrhythmics and/or ablation are options for rhythm control. She developed post operative AF and self converted. She was given short course of amiodarone. A cardiac monitor showed 9% PAF burden. This is all occurring within first month after surgery.  A follow up zio recently showed no further AF after further post operative healing. If further episodes of AF outside of acute post operative time, then would need to consider AAT and/or ablation as needed.     4. Risk Factor Management: BP control, and MARKO evaluation.        Follow up with EP on an as needed basis.        History of Present Illness/Subjective    Ms. Luis Alberto Garcia is a 72 year old female who comes in today for EP consultation of post operative AF.    Ms. Garcia is a 72 year old female who has a past medical history significant for CAD s/p CABG X5 (LIMA-Diag, rSVG to LAD, PDA, AILIN, OM) 9/14/22 , HTN, fatty liver, aortic regurgitation, GERD, emphysema, tobacco use, migraines, anxiety, and insomnia.     She had been experiencing jaw/neck pain, chest pressure, and LOPEZ for last 10 months. He had a Lexiscan that showed normal perfusion but abnormal ECG. He then had a CT coronary that showed moderate coronary calcifications. Subsequent coronary angiogram showed severe multivessel CAD. He was referred to CVTS. He underwent CABGX5 on 9/14/22. She was discharged 9/19 POD#6. She then awoke 9/20 with acute palpitations, dyspnea at rest+exertion, radiating neck pain, and presyncope. At CVTS clinic, ECG illustrated AF with RVR in the 140s and sent to ER. No prior history of subjective or document arrythmia. Commented that she was febrile post-op, and endorsing feeling flushed. She was placed on IV Amio and heparin infusion. She spontaneously converted overnight. Echo negative for pericardial effusion with no acute changes and LVEF 60-65%. She will discharge to home with Diltiazem 60 mg q6h prn in setting of HR > 110, amiodarone, and on cardiac monitor. [employer]Tel cardiac monitor showed 9% PAF burden. She has She was referred to EP for further management.     EP Visit 10/5/22: She reports feeling OK. Her main problem is LOPEZ, variable with different levels of exertion, and feels it is worse in evenings. BPs at home  running 130-140 range. She denies chest discomfort, palpitations, abdominal fullness/bloating or peripheral edema, paroxysmal nocturnal dyspnea, orthopnea, lightheadedness, dizziness, pre-syncope, or syncope. An echo today shows LVEF 65%. LFT and TFT from 9/2022 WDL. Presenting 12 lead ECG shows NSR Vent Rate 64 bpm,  ms, QRS 84 ms, QTc 447 ms. Current cardiac medications include: Lasix, Amiodarone, Toprol XL, Eliquis, Lisinopril, ASA, and Zetia.     She presents today for follow up. She reports feeling well. She denies chest discomfort, palpitations, abdominal fullness/bloating or peripheral edema, shortness of breath, paroxysmal nocturnal dyspnea, orthopnea, lightheadedness, dizziness, pre-syncope, or syncope. A zio patch monitor from 11/29/22-12/1/22 showed 12 PSVT up to 13 beats, slightly blunted HRs in sinus max 100 bpm, and 17 symptom activations mostly showed sinus 60-70s with symptoms of SOB and palpitations/irregular heart beats. Presenting 12 lead ECG shows NSR Vent Rate 64 bpm,  ms, QRS 80 ms, QTc 429 ms. 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     I have reviewed and updated the patient's Past Medical History, Social History, Family History and Medication List.     Cardiographics (Personally Reviewed) :   9/21/22 Echo:   Interpretation Summary  Global and regional left ventricular function is normal with an EF of 60-65%.  Global right ventricular function is normal.  Mild to moderate AS and AI.  IVC diameter <2.1 cm collapsing >50% with sniff suggests a normal RA pressure  of 3 mmHg.  No pericardial effusion is present.  No significant changes noted.    TTE 10/5/2022:  Interpretation Summary  Global and regional left ventricular function is hyperkinetic with an EF of 65-70%.  Right ventricular function, chamber size, wall motion, and thickness are  "normal.  Mild to moderate aortic insufficiency is present.  Pulmonary artery systolic pressure is normal.  The inferior vena cava is normal.  No significant changes noted.       Physical Examination   /70 (BP Location: Right arm, Patient Position: Sitting, Cuff Size: Adult Regular)   Pulse 66   Ht 1.674 m (5' 5.91\")   Wt 82.1 kg (180 lb 14.4 oz)   SpO2 96%   BMI 29.28 kg/m    Wt Readings from Last 3 Encounters:   10/17/22 80.2 kg (176 lb 12.8 oz)   10/05/22 83.1 kg (183 lb 1.6 oz)   09/30/22 86.4 kg (190 lb 8 oz)     General Appearance:   Alert, well-appearing and in no acute distress.   HEENT: Atraumatic, normocephalic. PERRL.  MMM.   Chest/Lungs:   Respirations unlabored.  Lungs are clear to auscultation.   Cardiovascular:   Regular rate and rhythm.  S1/S2. No murmur.    Abdomen:  Soft, nontender, nondistended.   Extremities: No cyanosis or clubbing. No edema.    Musculoskeletal: Moves all extremities.  Gait normal.   Skin: Warm, dry, intact.    Neurologic: Mood and affect are appropriate.  Alert and oriented to person, place, time, and situation.          Medications  Allergies   Current Outpatient Medications   Medication Sig Dispense Refill     acyclovir (ZOVIRAX) 400 MG tablet Take 400 mg by mouth daily as needed       albuterol (PROAIR HFA/PROVENTIL HFA/VENTOLIN HFA) 108 (90 Base) MCG/ACT inhaler Inhale 2 puffs into the lungs every 6 hours       aspirin (ASA) 81 MG chewable tablet Take 1 tablet (81 mg) by mouth daily (Patient not taking: No sig reported) 60 tablet 3     Ergocalciferol 50 MCG (2000 UT) TABS 50 mcg every morning       ezetimibe (ZETIA) 10 MG tablet Take 10 mg by mouth daily       fish oil-omega-3 fatty acids 1000 MG capsule Take 2 g by mouth every morning       furosemide (LASIX) 20 MG tablet Take 1 tablet (20 mg) by mouth daily 30 tablet 3     gabapentin (NEURONTIN) 100 MG capsule 200 mg At Bedtime       hydrochlorothiazide (HYDRODIURIL) 50 MG tablet Take 0.5 tablets (25 mg) by " mouth every morning 30 tablet 3     lisinopril (ZESTRIL) 10 MG tablet Take 1 tablet (10 mg) by mouth daily 90 tablet 3     magnesium 100 MG CAPS 200 mg At Bedtime       methocarbamol (ROBAXIN) 500 MG tablet Take 2 tablets (1,000 mg) by mouth every 6 hours as needed for muscle spasms or other (pain) 100 tablet 0     metoprolol succinate ER (TOPROL XL) 25 MG 24 hr tablet Take 25 mg by mouth every morning       mupirocin (BACTROBAN) 2 % external ointment as needed       nitroGLYcerin (NITROSTAT) 0.4 MG sublingual tablet 0.4 mg every 5 minutes as needed       nystatin (MYCOSTATIN) 817260 UNIT/ML suspension Swish and swallow or swish and spit 1 TEASPOONFUL 4 times per day for 10 days       omeprazole (PRILOSEC) 20 MG DR capsule 20 mg At Bedtime       oxyCODONE (ROXICODONE) 5 MG tablet Take 1 tablet (5 mg) by mouth every 6 hours as needed for moderate to severe pain (Patient not taking: Reported on 10/17/2022) 15 tablet 0     PARoxetine (PAXIL) 20 MG tablet Take 20 mg by mouth every morning       potassium chloride ER (K-TAB) 20 MEQ CR tablet Take Once or Twice daily to match lasix dose (Patient not taking: No sig reported) 30 tablet 0     potassium chloride ER (KLOR-CON M) 20 MEQ CR tablet Take 20 mEq by mouth 2 times daily (Patient not taking: Reported on 10/17/2022)       pravastatin (PRAVACHOL) 10 MG tablet Take 10 mg by mouth every morning (Patient not taking: No sig reported)       senna-docusate (SENOKOT-S/PERICOLACE) 8.6-50 MG tablet 1 tablet by Oral or Feeding Tube route 2 times daily as needed for constipation (Patient not taking: Reported on 10/17/2022) 60 tablet 0     sodium fluoride dental gel (PREVIDENT) 1.1 % GEL topical gel Brush 2x/day.  Do not eat or drink for 30 minutes after.       sulfamethoxazole-trimethoprim (BACTRIM) 400-80 MG tablet Take 1 tablet by mouth 2 times daily (Patient not taking: No sig reported) 14 tablet 0     zolpidem (AMBIEN) 5 MG tablet Take 0.5 tablets (2.5 mg) by mouth nightly as  needed for sleep 10 tablet 0    Allergies   Allergen Reactions     Atorvastatin      Kiwi      Latex      Other Drug Allergy (See Comments)      X ray dye and shingrex vaccine          Lab Results (Personally Reviewed)    Chemistry/lipid CBC Cardiac Enzymes/BNP/TSH/INR   Lab Results   Component Value Date    BUN 22.6 10/17/2022     (L) 10/17/2022    CO2 25 10/17/2022     Creatinine   Date Value Ref Range Status   10/17/2022 0.70 0.51 - 0.95 mg/dL Final       No results found for: CHOL, HDL, LDL, CHOLHDL   Lab Results   Component Value Date    WBC 9.5 10/17/2022    HGB 12.4 10/17/2022    HCT 39.4 10/17/2022    MCV 88 10/17/2022     (H) 10/17/2022    Lab Results   Component Value Date    TSH 1.89 09/20/2022    INR 1.04 09/20/2022        The patient states understanding and is agreeable with the plan.   MARCOS Mitchell CNP  Electrophysiology Consult Service  Pager: [phone]

## 2023-01-06 NOTE — NURSING NOTE
Chief Complaint   Patient presents with     Follow Up     Return EP          Vitals were taken, medications reconciled and EKG performed.     Jerome Randall, EMT   11:06 AM

## 2023-01-09 LAB
ATRIAL RATE - MUSE: 64 BPM
DIASTOLIC BLOOD PRESSURE - MUSE: NORMAL MMHG
INTERPRETATION ECG - MUSE: NORMAL
P AXIS - MUSE: 71 DEGREES
PR INTERVAL - MUSE: 178 MS
QRS DURATION - MUSE: 80 MS
QT - MUSE: 416 MS
QTC - MUSE: 429 MS
R AXIS - MUSE: 72 DEGREES
SYSTOLIC BLOOD PRESSURE - MUSE: NORMAL MMHG
T AXIS - MUSE: 92 DEGREES
VENTRICULAR RATE- MUSE: 64 BPM

## 2023-01-12 NOTE — PROGRESS NOTES
"        Chief Complaint: Establish general cardiovascular care    HPI (1/13/23): Luis Alberto Garcia is a 72 year old female with a past medical history of coronary disease (status post CABG in September 2022), hypertension, and former smoking who was referred to me to establish general cardiovascular care by my colleague Ms. Daiana Thompson (last seen October 2022) and my EP colleagues Ms. Lawanda Alfaro and Dr. Elmer López (last seen 01/06/2023).    Briefly, Ms. Garcia had been experiencing ~10 months of jaw/neck pain and dyspnea on exertion until she had a pharmacologic SPECT at [facility] in July 2022.  While the stress/perfusion part was unremarkable, she exhibited 1 mm downsloping ST depressions in the ECG portion (territory unclear if Care Everywhere records).  She subsequently underwent invasive coronary angiography at [facility], which yielded a 70% proximal LAD disease, with an 80% first diagonal lesion, 80% mid circumflex lesion, 70% OM1 lesion, and a 80% mid RCA lesion.  Ms. Garcia was then referred to cardiac surgery at the [facility] for coronary bypass grafting, which she successfully underwent in September 2022, as below.    Ms. Garcia's postoperative course was complicated by a left-sided pleural effusion that required thoracentesis and postoperative atrial fibrillation.  Ms. Garcia's atrial fibrillation manifested as palpitations, dyspnea, and presyncope.  An ECG in the cardiac surgery clinic demonstrated atrial fibrillation, over-the-counter to be sent to the ER.  In the emergency room, she cardiovert after she was started on amiodarone infusion.  Ms. Garcia was transitioned to oral amiodarone regimen along with apixaban and metoprolol succinate and has since seen my colleague Dr. López in EP clinic.     At the time of the consultation, Ms. Garcia feels that she is \"nearly back to normal\".  She is interested in resuming downhill skiing this winter.  Ms. Garcia has had issues " with myalgias in the past with pravastatin, atorvastatin, and rosuvastatin.  Her primary care team is currently administering a low-dose of rosuvastatin to see if she will be able to tolerate it.  Ms. Garcia had an unpleasant experience in cardiac rehab and decided to instead ramp up her physical activity at home.  She notes an absence of chest pain at rest, dyspnea at rest or with exertion, PND, orthopnea, peripheral edema, palpitations, lightheadedness or syncope.     A comprehensive ROS was done and the details are included above in the HPI.    Past Medical History:  Coronary artery disease (status post CABG in September 2022)  Hypertension  Dyslipidemia  Atrial fibrillation, postoperative (treated with IV amiodarone and brief course of oral amiodarone after admission at [facility])  - No prior history of T2DM, TIA/stroke, vascular aneurysms, PAD  Past Medical History:   Diagnosis Date     Anxiety      Centrilobular emphysema (H)      Childhood asthma      Coronary artery disease involving native coronary artery of native heart      Esophageal reflux      Essential hypertension      Fatty liver disease      Hair loss      Migraine      Mild aortic stenosis      Mixed hyperlipidemia      MRSA infection     Skin abscesses after COVID vaccinations     Osteoarthritis      Primary insomnia        Past Surgical History:  CABG (LIMA to, reverse SVG to LAD, reverse SVG to PDA, reverse SVG to PL, reverse SVG to OM) by Dr. Pathak   Past Surgical History:   Procedure Laterality Date     BYPASS GRAFT ARTERY CORONARY N/A 9/14/2022    Procedure: MEDIAN STERNOTOMY, CORONARY ARTERY BYPASS GRAFT (CABG) X  5 USING THE LEFT INTERNAL MAMMARY AND ENDOSCOPICALLY HARVESTED RIGHT AND LEFT SAPHENOUS VEIN, ON CARDIOPULMONARY BYPASS, INTRAOPERATIVE TRANSESOPHAGEAL ECHOCARDIOGRAM BY ANESTHESIA.;  Surgeon: Galo Pathak MD;  Location: [facility] OR     CATARACT IOL, RT/LT       IR LUMBAR EPIDURAL STEROID INJECTION  10/06/2005     IR LUMBAR EPIDURAL STEROID  INJECTION  11/09/2005     IR THORACENTESIS  9/23/2022     Left fibular fracture ORIF  2013     REPLACEMENT TOTAL KNEE Left 2020     REPLACEMENT TOTAL KNEE Right 11/2021     ROTATOR CUFF REPAIR RT/LT Right 2017       Drug History:  Home cardiac meds: Amlodipine 2.5 mg once daily, aspirin 81 mg once daily, ezetimibe 10 mg daily, furosemide 20 mg daily, hydrochlorothiazide 25 mg, lisinopril 10 mg daily, metoprolol succinate 25 mg daily, rosuvastatin 5 mg twice weekly  Current Outpatient Medications   Medication Sig Dispense Refill     acyclovir (ZOVIRAX) 400 MG tablet Take 400 mg by mouth daily as needed       albuterol (PROAIR HFA/PROVENTIL HFA/VENTOLIN HFA) 108 (90 Base) MCG/ACT inhaler Inhale 2 puffs into the lungs every 6 hours       aspirin (ASA) 81 MG chewable tablet Take 1 tablet (81 mg) by mouth daily 60 tablet 3     Coenzyme Q10 (CO Q 10) 10 MG CAPS Take 1 capsule by mouth daily       Ergocalciferol 50 MCG (2000 UT) TABS 50 mcg every morning       fish oil-omega-3 fatty acids 1000 MG capsule Take 2 g by mouth every morning       furosemide (LASIX) 20 MG tablet Take 1 tablet (20 mg) by mouth daily 30 tablet 3     gabapentin (NEURONTIN) 100 MG capsule 200 mg At Bedtime       hydrochlorothiazide (HYDRODIURIL) 50 MG tablet Take 0.5 tablets (25 mg) by mouth every morning 30 tablet 3     lisinopril (ZESTRIL) 20 MG tablet Take 20 mg by mouth daily       magnesium 100 MG CAPS 200 mg At Bedtime       metoprolol succinate ER (TOPROL XL) 25 MG 24 hr tablet Take 25 mg by mouth every morning       mupirocin (BACTROBAN) 2 % external ointment as needed       nitroGLYcerin (NITROSTAT) 0.4 MG sublingual tablet 0.4 mg every 5 minutes as needed       nystatin (MYCOSTATIN) 942772 UNIT/ML suspension Swish and swallow or swish and spit 1 TEASPOONFUL 4 times per day for 10 days       omeprazole (PRILOSEC) 20 MG DR capsule 20 mg At Bedtime       PARoxetine (PAXIL) 20 MG tablet Take 20 mg by mouth every morning       rosuvastatin  (CRESTOR) 5 MG tablet Take 1 pill Monday and 1 pill Friday of each week       sodium fluoride dental gel (PREVIDENT) 1.1 % GEL topical gel Brush 2x/day.  Do not eat or drink for 30 minutes after.       zolpidem (AMBIEN) 5 MG tablet Take 0.5 tablets (2.5 mg) by mouth nightly as needed for sleep 10 tablet 0     amLODIPine (NORVASC) 2.5 MG tablet Take 1 tablet by mouth daily at 2 pm (Patient not taking: Reported on 2023)       ezetimibe (ZETIA) 10 MG tablet Take 10 mg by mouth daily (Patient not taking: Reported on 2023)       lisinopril (ZESTRIL) 10 MG tablet Take 1 tablet (10 mg) by mouth daily (Patient not taking: Reported on 2023) 90 tablet 3     methocarbamol (ROBAXIN) 500 MG tablet Take 2 tablets (1,000 mg) by mouth every 6 hours as needed for muscle spasms or other (pain) (Patient not taking: Reported on 2023) 100 tablet 0     potassium chloride ER (KLOR-CON M) 20 MEQ CR tablet Take 20 mEq by mouth 2 times daily (Patient not taking: Reported on 10/17/2022)       pravastatin (PRAVACHOL) 10 MG tablet Take 10 mg by mouth every morning (Patient not taking: Reported on 2022)       sulfamethoxazole-trimethoprim (BACTRIM) 400-80 MG tablet Take 1 tablet by mouth 2 times daily (Patient not taking: Reported on 2022) 14 tablet 0         Family History:  No family history of premature coronary disease  Family History   Problem Relation Age of Onset     Lung Cancer Mother      LUNG DISEASE Father      Hypothyroidism Sister      Hypothyroidism Sister      Osteosarcoma Sister      Anesthesia Reaction No family hx of        Social History:  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[employer])  Social History     Tobacco Use     Smoking status: Former     Packs/day: 1.00     Years: 25.00     Pack years: 25.00     Types: Cigarettes     Quit date: 1992     Years since quittin.9     Smokeless tobacco: Never   Substance Use Topics     Alcohol use: Yes     Comment: socially       Allergies    Allergen Reactions     Atorvastatin      Kiwi      Latex      Other Drug Allergy (See Comments)      X ray dye and shingrex vaccine          Physical Examination:  Vitals: /60 (BP Location: Right arm, Patient Position: Chair, Cuff Size: Adult Regular)   Pulse 65   Wt 83.2 kg (183 lb 6.4 oz)   SpO2 95%   BMI 29.69 kg/m    BMI= Body mass index is 29.69 kg/m .    GENERAL: Healthy, alert and no distress  Eyes: No xanthelasmas.  NECK: No palpable neck masses/goitre and no evidence of retrosternal goitre.   ENT: Moist mucosal membranes.  RESPIRATORY: No signs of resp distress. Lungs CTAB.  CARDIOVASCULAR:  Well-healed midline sternotomy scar. No JVD, regular, normal S1+S2 without added sounds or murmurs.  ABDOMEN: ND, soft, non-tender, normal bowel sounds.  EXTREMITIES: Warm, well-perfused, no edema.   NEUROLOGY: GCS 15/15, no focal deficits.  VASC: No carotid bruits. Carotid, radial, brachial, popliteal, dorsalis pedis and posterior tibialis pulses are normal in volumes and symmetric bilaterally.   SKIN: No ecchymoses, no rashes.  PSYCH: Cooperative, pleasant affect.       Investigations:  I personally viewed and interpreted the following investigations:    Labs:    CBC RESULTS:  Lab Results   Component Value Date    WBC 9.5 10/17/2022    RBC 4.47 10/17/2022    HGB 12.4 10/17/2022    HCT 39.4 10/17/2022    MCV 88 10/17/2022    MCH 27.7 10/17/2022    MCHC 31.5 10/17/2022    RDW 13.9 10/17/2022     (H) 10/17/2022       CMP RESULTS:  Lab Results   Component Value Date     (L) 10/17/2022    POTASSIUM 4.6 10/17/2022    POTASSIUM 4.1 09/14/2022    CHLORIDE 98 10/17/2022    CO2 25 10/17/2022    ANIONGAP 11 10/17/2022     (H) 10/17/2022     (H) 09/16/2022    BUN 22.6 10/17/2022    CR 0.70 10/17/2022    GFRESTIMATED >90 10/17/2022    ALYCE 9.5 10/17/2022    BILITOTAL 0.3 09/21/2022    ALBUMIN 3.2 (L) 09/21/2022    ALKPHOS 60 09/21/2022    ALT 30 09/21/2022    AST 30 09/21/2022        INR  RESULTS:  Lab Results   Component Value Date    INR 1.04 09/20/2022         LIPIDS:  Total cholesterol 141, triglyceride 94, HDL 35, LDL 87.      Recent Tests:    Electrocardiogram (01/06/2023):  NSR, no ischemic changes    Ambulatory ECG monitor from 11/29/2022 to 12/11/2022:      Echocardiogram (10/05/2022):  Global and regional left ventricular function is hyperkinetic with an EF of 65-70%.  Right ventricular function, chamber size, wall motion, and thickness are normal.  Mild to moderate aortic insufficiency is present.  Pulmonary artery systolic pressure is normal.  The inferior vena cava is normal.  No significant changes noted.    Assessment and Plan:   Luis Alberto Garcia is a 72 year old female with a past medical history of coronary artery disease, hypertension, and dyslipidemia who presented to establish general cardiovascular care in January 2023    I was happy to see that Ms. Garcia did not exhibit any signs of heart failure and was complete devoid of angina at the moment.  I told her was reasonable for her to resume her Татьяна scan, with the understanding that chest trauma can still be deleterious as it takes at least 6 months for the layers of the chest wall to heal after sternotomy.  I did also warn her that trauma on aspirin may lead to greater bleeding.  Other than that, we talked about the importance of maintaining regular physical activity and a healthy cardiovascular diet.    Should the rosuvastatin trial not decrease her LDL to less than 70 with an acceptable side effect profile, I would suggest that she start a PCSK9 inhibitor given her established coronary artery disease.  I have advised Ms. Garcia to spend some time reading literature surrounding Praluent and Repatha.    Problems:  1. Coronary artery disease (status post CABG in September 2022)  2. Hypertension  3. Dyslipidemia  4. Atrial fibrillation, postoperative (treated with IV amiodarone and brief course of oral amiodarone after admission  at [facility])    Plan:  - PCSK9 inhibitor if rosuvastatin does not decrease LDL to less than 70  - Continue ezetimibe 10 mg q24h   - Continue aspirin 81 mg once daily lifelong  - Continue amlodipine 2.5 mg once daily, hydrochlorothiazide 25 mg, lisinopril 10 mg daily, metoprolol succinate 25 mg daily for BP   - Continue furosemide 20 mg q24h for edema    A total of 60 minutes were spent on the day of the visit for chart review, care coordination, face-to-face consultation with the patient, and documentation.       Gallo Doshi, [facility], [place]    Cardiovascular Division  Pager [phone]    CC  Patient Care Team:  Shiv Gomez MD as PCP - General (Internal Medicine)  Natalya Adame APRN CNP as Nurse Practitioner (Anesthesiology)  Cara Kumar APRN CNS as Clinical Nurse Specialist (Anesthesiology)  Eleuterio Guadalupe MD as MD (Cardiovascular Disease)  Neha Weller, RN as Specialty Care Coordinator (Cardiology)  Elmer López MD as MD (Cardiovascular Disease)  Daiana Thompson NP as Nurse Practitioner (Cardiovascular Disease)  Daiana Thompson NP as Assigned Heart and Vascular Provider

## 2023-01-13 ENCOUNTER — OFFICE VISIT (OUTPATIENT)
Dept: CARDIOLOGY | Facility: CLINIC | Age: 73
End: 2023-01-13
Attending: STUDENT IN AN ORGANIZED HEALTH CARE EDUCATION/TRAINING PROGRAM
Payer: COMMERCIAL

## 2023-01-13 VITALS
BODY MASS INDEX: 29.69 KG/M2 | SYSTOLIC BLOOD PRESSURE: 115 MMHG | WEIGHT: 183.4 LBS | HEART RATE: 65 BPM | DIASTOLIC BLOOD PRESSURE: 60 MMHG | OXYGEN SATURATION: 95 %

## 2023-01-13 DIAGNOSIS — Z95.1 S/P CABG (CORONARY ARTERY BYPASS GRAFT): ICD-10-CM

## 2023-01-13 DIAGNOSIS — I25.810 CORONARY ARTERY DISEASE INVOLVING AUTOLOGOUS ARTERY CORONARY BYPASS GRAFT WITHOUT ANGINA PECTORIS: ICD-10-CM

## 2023-01-13 PROCEDURE — G0463 HOSPITAL OUTPT CLINIC VISIT: HCPCS

## 2023-01-13 PROCEDURE — G0463 HOSPITAL OUTPT CLINIC VISIT: HCPCS | Performed by: STUDENT IN AN ORGANIZED HEALTH CARE EDUCATION/TRAINING PROGRAM

## 2023-01-13 PROCEDURE — 99417 PROLNG OP E/M EACH 15 MIN: CPT | Performed by: STUDENT IN AN ORGANIZED HEALTH CARE EDUCATION/TRAINING PROGRAM

## 2023-01-13 PROCEDURE — 99215 OFFICE O/P EST HI 40 MIN: CPT | Performed by: STUDENT IN AN ORGANIZED HEALTH CARE EDUCATION/TRAINING PROGRAM

## 2023-01-13 RX ORDER — LISINOPRIL 20 MG/1
20 TABLET ORAL DAILY
COMMUNITY
Start: 2023-01-09

## 2023-01-13 RX ORDER — ROSUVASTATIN CALCIUM 5 MG/1
TABLET, COATED ORAL
COMMUNITY
Start: 2023-01-09 | End: 2024-04-04

## 2023-01-13 RX ORDER — ASCORBIC ACID 1000 MG
1 TABLET ORAL DAILY
COMMUNITY
Start: 2023-01-09

## 2023-01-13 ASSESSMENT — PAIN SCALES - GENERAL: PAINLEVEL: NO PAIN (0)

## 2023-01-13 NOTE — NURSING NOTE
Chief Complaint   Patient presents with     New Patient     Scheulued with general for establishing cardiology care        Vitals were taken and medications reconciled.    Chance Lr, EMT  11:21 AM

## 2023-01-13 NOTE — LETTER
1/13/2023      RE: Luis Alberto Garcia  [address] [place] [address]       Dear Colleague,    Thank you for the opportunity to participate in the care of your patient, Luis Alberto Garcia, at the [facility]. Please see a copy of my visit note below.            Chief Complaint: Establish general cardiovascular care    HPI (1/13/23): Luis Alberto Garcia is a 72 year old female with a past medical history of coronary disease (status post CABG in September 2022), hypertension, and former smoking who was referred to me to establish general cardiovascular care by my colleague Ms. Daiana Thompson (last seen October 2022) and my EP colleagues Ms. Lawanda Alfaro and Dr. Elmer López (last seen 01/06/2023).    Briefly, Ms. Garcia had been experiencing ~10 months of jaw/neck pain and dyspnea on exertion until she had a pharmacologic SPECT at [facility] in July 2022.  While the stress/perfusion part was unremarkable, she exhibited 1 mm downsloping ST depressions in the ECG portion (territory unclear if Care Everywhere records).  She subsequently underwent invasive coronary angiography at [facility], which yielded a 70% proximal LAD disease, with an 80% first diagonal lesion, 80% mid circumflex lesion, 70% OM1 lesion, and a 80% mid RCA lesion.  Ms. Garcia was then referred to cardiac surgery at the [facility] for coronary bypass grafting, which she successfully underwent in September 2022, as below.    Ms. Garcia's postoperative course was complicated by a left-sided pleural effusion that required thoracentesis and postoperative atrial fibrillation.  Ms. Garcia's atrial fibrillation manifested as palpitations, dyspnea, and presyncope.  An ECG in the cardiac surgery clinic demonstrated atrial fibrillation, over-the-counter to be sent to the ER.  In the emergency room, she cardiovert after she was started on amiodarone  "infusion.  Ms. Garcia was transitioned to oral amiodarone regimen along with apixaban and metoprolol succinate and has since seen my colleague Dr. López in EP clinic.     At the time of the consultation, Ms. Garcia feels that she is \"nearly back to normal\".  She is interested in resuming downhill skiing this winter.  Ms. Garcia has had issues with myalgias in the past with pravastatin, atorvastatin, and rosuvastatin.  Her primary care team is currently administering a low-dose of rosuvastatin to see if she will be able to tolerate it.  Ms. Garcia had an unpleasant experience in cardiac rehab and decided to instead ramp up her physical activity at home.  She notes an absence of chest pain at rest, dyspnea at rest or with exertion, PND, orthopnea, peripheral edema, palpitations, lightheadedness or syncope.     A comprehensive ROS was done and the details are included above in the HPI.    Past Medical History:  Coronary artery disease (status post CABG in September 2022)  Hypertension  Dyslipidemia  Atrial fibrillation, postoperative (treated with IV amiodarone and brief course of oral amiodarone after admission at [facility])  - No prior history of T2DM, TIA/stroke, vascular aneurysms, PAD  Past Medical History:   Diagnosis Date     Anxiety      Centrilobular emphysema (H)      Childhood asthma      Coronary artery disease involving native coronary artery of native heart      Esophageal reflux      Essential hypertension      Fatty liver disease      Hair loss      Migraine      Mild aortic stenosis      Mixed hyperlipidemia      MRSA infection     Skin abscesses after COVID vaccinations     Osteoarthritis      Primary insomnia        Past Surgical History:  CABG (LIMA to, reverse SVG to LAD, reverse SVG to PDA, reverse SVG to PL, reverse SVG to OM) by Dr. Pathak   Past Surgical History:   Procedure Laterality Date     BYPASS GRAFT ARTERY CORONARY N/A 9/14/2022    Procedure: MEDIAN STERNOTOMY, CORONARY ARTERY BYPASS GRAFT " (CABG) X  5 USING THE LEFT INTERNAL MAMMARY AND ENDOSCOPICALLY HARVESTED RIGHT AND LEFT SAPHENOUS VEIN, ON CARDIOPULMONARY BYPASS, INTRAOPERATIVE TRANSESOPHAGEAL ECHOCARDIOGRAM BY ANESTHESIA.;  Surgeon: Galo Pathak MD;  Location: [facility] OR     CATARACT IOL, RT/LT       IR LUMBAR EPIDURAL STEROID INJECTION  10/06/2005     IR LUMBAR EPIDURAL STEROID INJECTION  11/09/2005     IR THORACENTESIS  9/23/2022     Left fibular fracture ORIF  2013     REPLACEMENT TOTAL KNEE Left 2020     REPLACEMENT TOTAL KNEE Right 11/2021     ROTATOR CUFF REPAIR RT/LT Right 2017       Drug History:  Home cardiac meds: Amlodipine 2.5 mg once daily, aspirin 81 mg once daily, ezetimibe 10 mg daily, furosemide 20 mg daily, hydrochlorothiazide 25 mg, lisinopril 10 mg daily, metoprolol succinate 25 mg daily, rosuvastatin 5 mg twice weekly  Current Outpatient Medications   Medication Sig Dispense Refill     acyclovir (ZOVIRAX) 400 MG tablet Take 400 mg by mouth daily as needed       albuterol (PROAIR HFA/PROVENTIL HFA/VENTOLIN HFA) 108 (90 Base) MCG/ACT inhaler Inhale 2 puffs into the lungs every 6 hours       aspirin (ASA) 81 MG chewable tablet Take 1 tablet (81 mg) by mouth daily 60 tablet 3     Coenzyme Q10 (CO Q 10) 10 MG CAPS Take 1 capsule by mouth daily       Ergocalciferol 50 MCG (2000 UT) TABS 50 mcg every morning       fish oil-omega-3 fatty acids 1000 MG capsule Take 2 g by mouth every morning       furosemide (LASIX) 20 MG tablet Take 1 tablet (20 mg) by mouth daily 30 tablet 3     gabapentin (NEURONTIN) 100 MG capsule 200 mg At Bedtime       hydrochlorothiazide (HYDRODIURIL) 50 MG tablet Take 0.5 tablets (25 mg) by mouth every morning 30 tablet 3     lisinopril (ZESTRIL) 20 MG tablet Take 20 mg by mouth daily       magnesium 100 MG CAPS 200 mg At Bedtime       metoprolol succinate ER (TOPROL XL) 25 MG 24 hr tablet Take 25 mg by mouth every morning       mupirocin (BACTROBAN) 2 % external ointment as needed       nitroGLYcerin (NITROSTAT)  0.4 MG sublingual tablet 0.4 mg every 5 minutes as needed       nystatin (MYCOSTATIN) 001242 UNIT/ML suspension Swish and swallow or swish and spit 1 TEASPOONFUL 4 times per day for 10 days       omeprazole (PRILOSEC) 20 MG DR capsule 20 mg At Bedtime       PARoxetine (PAXIL) 20 MG tablet Take 20 mg by mouth every morning       rosuvastatin (CRESTOR) 5 MG tablet Take 1 pill Monday and 1 pill Friday of each week       sodium fluoride dental gel (PREVIDENT) 1.1 % GEL topical gel Brush 2x/day.  Do not eat or drink for 30 minutes after.       zolpidem (AMBIEN) 5 MG tablet Take 0.5 tablets (2.5 mg) by mouth nightly as needed for sleep 10 tablet 0     amLODIPine (NORVASC) 2.5 MG tablet Take 1 tablet by mouth daily at 2 pm (Patient not taking: Reported on 1/13/2023)       ezetimibe (ZETIA) 10 MG tablet Take 10 mg by mouth daily (Patient not taking: Reported on 1/6/2023)       lisinopril (ZESTRIL) 10 MG tablet Take 1 tablet (10 mg) by mouth daily (Patient not taking: Reported on 1/13/2023) 90 tablet 3     methocarbamol (ROBAXIN) 500 MG tablet Take 2 tablets (1,000 mg) by mouth every 6 hours as needed for muscle spasms or other (pain) (Patient not taking: Reported on 1/6/2023) 100 tablet 0     potassium chloride ER (KLOR-CON M) 20 MEQ CR tablet Take 20 mEq by mouth 2 times daily (Patient not taking: Reported on 10/17/2022)       pravastatin (PRAVACHOL) 10 MG tablet Take 10 mg by mouth every morning (Patient not taking: Reported on 9/20/2022)       sulfamethoxazole-trimethoprim (BACTRIM) 400-80 MG tablet Take 1 tablet by mouth 2 times daily (Patient not taking: Reported on 9/30/2022) 14 tablet 0         Family History:  No family history of premature coronary disease  Family History   Problem Relation Age of Onset     Lung Cancer Mother      LUNG DISEASE Father      Hypothyroidism Sister      Hypothyroidism Sister      Osteosarcoma Sister      Anesthesia Reaction No family hx of        Social History:  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[employer])  Social History     Tobacco Use     Smoking status: Former     Packs/day: 1.00     Years: 25.00     Pack years: 25.00     Types: Cigarettes     Quit date: 1992     Years since quittin.9     Smokeless tobacco: Never   Substance Use Topics     Alcohol use: Yes     Comment: socially       Allergies   Allergen Reactions     Atorvastatin      Kiwi      Latex      Other Drug Allergy (See Comments)      X ray dye and shingrex vaccine          Physical Examination:  Vitals: /60 (BP Location: Right arm, Patient Position: Chair, Cuff Size: Adult Regular)   Pulse 65   Wt 83.2 kg (183 lb 6.4 oz)   SpO2 95%   BMI 29.69 kg/m    BMI= Body mass index is 29.69 kg/m .    GENERAL: Healthy, alert and no distress  Eyes: No xanthelasmas.  NECK: No palpable neck masses/goitre and no evidence of retrosternal goitre.   ENT: Moist mucosal membranes.  RESPIRATORY: No signs of resp distress. Lungs CTAB.  CARDIOVASCULAR:  Well-healed midline sternotomy scar. No JVD, regular, normal S1+S2 without added sounds or murmurs.  ABDOMEN: ND, soft, non-tender, normal bowel sounds.  EXTREMITIES: Warm, well-perfused, no edema.   NEUROLOGY: GCS 15/15, no focal deficits.  VASC: No carotid bruits. Carotid, radial, brachial, popliteal, dorsalis pedis and posterior tibialis pulses are normal in volumes and symmetric bilaterally.   SKIN: No ecchymoses, no rashes.  PSYCH: Cooperative, pleasant affect.       Investigations:  I personally viewed and interpreted the following investigations:    Labs:    CBC RESULTS:  Lab Results   Component Value Date    WBC 9.5 10/17/2022    RBC 4.47 10/17/2022    HGB 12.4 10/17/2022    HCT 39.4 10/17/2022    MCV 88 10/17/2022    MCH 27.7 10/17/2022    MCHC 31.5 10/17/2022    RDW 13.9 10/17/2022     (H) 10/17/2022       CMP RESULTS:  Lab Results   Component Value Date     (L) 10/17/2022    POTASSIUM 4.6 10/17/2022    POTASSIUM 4.1 2022    CHLORIDE 98  10/17/2022    CO2 25 10/17/2022    ANIONGAP 11 10/17/2022     (H) 10/17/2022     (H) 09/16/2022    BUN 22.6 10/17/2022    CR 0.70 10/17/2022    GFRESTIMATED >90 10/17/2022    ALYCE 9.5 10/17/2022    BILITOTAL 0.3 09/21/2022    ALBUMIN 3.2 (L) 09/21/2022    ALKPHOS 60 09/21/2022    ALT 30 09/21/2022    AST 30 09/21/2022        INR RESULTS:  Lab Results   Component Value Date    INR 1.04 09/20/2022         LIPIDS:  Total cholesterol 141, triglyceride 94, HDL 35, LDL 87.      Recent Tests:    Electrocardiogram (01/06/2023):  NSR, no ischemic changes    Ambulatory ECG monitor from 11/29/2022 to 12/11/2022:      Echocardiogram (10/05/2022):  Global and regional left ventricular function is hyperkinetic with an EF of 65-70%.  Right ventricular function, chamber size, wall motion, and thickness are normal.  Mild to moderate aortic insufficiency is present.  Pulmonary artery systolic pressure is normal.  The inferior vena cava is normal.  No significant changes noted.    Assessment and Plan:   Luis Alberto Garcia is a 72 year old female with a past medical history of coronary artery disease, hypertension, and dyslipidemia who presented to establish general cardiovascular care in January 2023    I was happy to see that Ms. Garcia did not exhibit any signs of heart failure and was complete devoid of angina at the moment.  I told her was reasonable for her to resume her Татьяна scan, with the understanding that chest trauma can still be deleterious as it takes at least 6 months for the layers of the chest wall to heal after sternotomy.  I did also warn her that trauma on aspirin may lead to greater bleeding.  Other than that, we talked about the importance of maintaining regular physical activity and a healthy cardiovascular diet.    Should the rosuvastatin trial not decrease her LDL to less than 70 with an acceptable side effect profile, I would suggest that she start a PCSK9 inhibitor given her established coronary  artery disease.  I have advised Ms. Garcia to spend some time reading literature surrounding Praluent and Repatha.    Problems:  1. Coronary artery disease (status post CABG in September 2022)  2. Hypertension  3. Dyslipidemia  4. Atrial fibrillation, postoperative (treated with IV amiodarone and brief course of oral amiodarone after admission at [facility])    Plan:  - PCSK9 inhibitor if rosuvastatin does not decrease LDL to less than 70  - Continue ezetimibe 10 mg q24h   - Continue aspirin 81 mg once daily lifelong  - Continue amlodipine 2.5 mg once daily, hydrochlorothiazide 25 mg, lisinopril 10 mg daily, metoprolol succinate 25 mg daily for BP   - Continue furosemide 20 mg q24h for edema    A total of 60 minutes were spent on the day of the visit for chart review, care coordination, face-to-face consultation with the patient, and documentation.       Gallo Doshi, [facility], [place]    Cardiovascular Division  Pager [phone]    CC  Patient Care Team:  Shiv Gomez MD as PCP - General (Internal Medicine)  Natalya Adame APRN CNP as Nurse Practitioner (Anesthesiology)  Cara Kumar APRN CNS as Clinical Nurse Specialist (Anesthesiology)  Eleuterio Guadalupe MD as MD (Cardiovascular Disease)  Neha Weller, RN as Specialty Care Coordinator (Cardiology)  Elmer López MD as MD (Cardiovascular Disease)  Daiana Thompson NP as Nurse Practitioner (Cardiovascular Disease)  Daiana Thompson NP as Assigned Heart and Vascular Provider

## 2023-01-13 NOTE — PATIENT INSTRUCTIONS
You were seen at the [facility] clinic today.   You saw Dr. Gallo Doshi, [facility], [place].    The following is a summary of your office visit today:    Consider Praluent or Repatha for cholesterol   Please try and pursue moderate-intensity exercise for 30 minutes at least five times weekly.  Please try and maintain a diet rich in fruit and vegetables and low in saturated fats and sugars.   Follow up with me in three months to discuss cholesterol medications       If you have questions after this visit:   Send a [employer] message or contact the Cardiology Nurse Line  Office:  [phone] option #1, then #4 and ask for a Cardiology Nurse  Fax:  [phone]   Appointments:  [phone] option #1, then option #1     HOW TO CHECK YOUR BLOOD PRESSURE AT HOME:    Avoid eating, smoking, and exercising for at least 30 minutes before taking a reading.    Be sure you have taken your BP medication at least 2-3 hours before you check it.     Sit quietly for 10 minutes before a reading.     Sit in a chair with your feet flat on the floor. Rest your  arm on a table so that the arm cuff is at the same level as your heart.    Remain still during the reading.    Record your blood pressure and pulse in a log and bring to your next appointment.     If you have had any blood work, imaging or other testing completed we will be in touch within 1-2 weeks regarding the results. If you have any questions, concerns or need to schedule a follow up, please contact us at the numbers above. If you are needing refills please contact your pharmacy. For urgent after-hours care please call the the [facility] at [phone] (option #4) and ask to speak to the on-call Cardiologist.    It was a pleasure meeting with you today. Please let us know if there is anything else we can do for you so that we can be sure you are leaving completely satisfied with your care experience.     Your Cardiology  Team at the [facility]

## 2023-02-27 DIAGNOSIS — I35.1 NONRHEUMATIC AORTIC VALVE INSUFFICIENCY: ICD-10-CM

## 2023-03-02 RX ORDER — FUROSEMIDE 20 MG
TABLET ORAL
Qty: 90 TABLET | Refills: 2 | Status: SHIPPED | OUTPATIENT
Start: 2023-03-02 | End: 2023-04-14

## 2023-04-14 ENCOUNTER — ANCILLARY PROCEDURE (OUTPATIENT)
Dept: CARDIOLOGY | Facility: CLINIC | Age: 73
End: 2023-04-14
Attending: NURSE PRACTITIONER
Payer: COMMERCIAL

## 2023-04-14 VITALS
WEIGHT: 187.5 LBS | HEIGHT: 67 IN | OXYGEN SATURATION: 94 % | BODY MASS INDEX: 29.43 KG/M2 | SYSTOLIC BLOOD PRESSURE: 118 MMHG | DIASTOLIC BLOOD PRESSURE: 70 MMHG | HEART RATE: 82 BPM

## 2023-04-14 DIAGNOSIS — R06.02 SHORTNESS OF BREATH: ICD-10-CM

## 2023-04-14 DIAGNOSIS — I35.1 NONRHEUMATIC AORTIC VALVE INSUFFICIENCY: ICD-10-CM

## 2023-04-14 DIAGNOSIS — I48.0 PAF (PAROXYSMAL ATRIAL FIBRILLATION) (H): ICD-10-CM

## 2023-04-14 DIAGNOSIS — I49.9 IRREGULAR HEART BEAT: ICD-10-CM

## 2023-04-14 DIAGNOSIS — Z95.1 S/P CABG (CORONARY ARTERY BYPASS GRAFT): ICD-10-CM

## 2023-04-14 DIAGNOSIS — I25.118 CORONARY ARTERY DISEASE OF NATIVE ARTERY OF NATIVE HEART WITH STABLE ANGINA PECTORIS (H): Primary | ICD-10-CM

## 2023-04-14 DIAGNOSIS — E78.5 HYPERLIPIDEMIA WITH TARGET LOW DENSITY LIPOPROTEIN (LDL) CHOLESTEROL LESS THAN 70 MG/DL: ICD-10-CM

## 2023-04-14 PROCEDURE — G0463 HOSPITAL OUTPT CLINIC VISIT: HCPCS | Performed by: NURSE PRACTITIONER

## 2023-04-14 PROCEDURE — 99214 OFFICE O/P EST MOD 30 MIN: CPT | Mod: 25 | Performed by: NURSE PRACTITIONER

## 2023-04-14 RX ORDER — FUROSEMIDE 20 MG
TABLET ORAL
Qty: 90 TABLET | Refills: 2 | Status: SHIPPED | OUTPATIENT
Start: 2023-04-14 | End: 2024-04-04

## 2023-04-14 ASSESSMENT — PAIN SCALES - GENERAL: PAINLEVEL: NO PAIN (0)

## 2023-04-14 NOTE — NURSING NOTE
Chief Complaint   Patient presents with     Follow Up     Return Hoff general cardiology pt          Vitals were taken and medications reconciled.     Demarcus Michel, Visit Facilitator    2:50 PM

## 2023-04-14 NOTE — PROGRESS NOTES
"  [facility] - [facility]   [place] Clinic Note      HPI:   Ms. Garcia is a 72 year old female with medical history pertinent for coronary disease (status post CABG in September 2022), hypertension, and former smoking who presents to cardiology clinic for routine follow up.     Briefly, Ms. Garcia had been experiencing ~10 months of jaw/neck pain and dyspnea on exertion until she had a pharmacologic SPECT at [facility] in July 2022.  While the stress/perfusion part was unremarkable, she exhibited 1 mm downsloping ST depressions in the ECG portion (territory unclear if Care Everywhere records).  She subsequently underwent invasive coronary angiography at [facility], which yielded a 70% proximal LAD disease, with an 80% first diagonal lesion, 80% mid circumflex lesion, 70% OM1 lesion, and a 80% mid RCA lesion.  Ms. Garcia was then referred to cardiac surgery at the [facility] for coronary bypass grafting, which she successfully underwent in September 2022, as below.     Ms. Garcia's postoperative course was complicated by a left-sided pleural effusion that required thoracentesis and postoperative atrial fibrillation.  Ms. Garcia's atrial fibrillation manifested as palpitations, dyspnea, and presyncope.  An ECG in the cardiac surgery clinic demonstrated atrial fibrillation and she was subsequently sent to the ER.  In the emergency room, she underwent DCCV after she was started on amiodarone infusion.  Ms. Garcia was transitioned to oral amiodarone regimen along with apixaban and metoprolol succinate and has since followed up with Dr. López.    Most recently, in January 2023 Ms. Garcia established general cardiology care with Dr. Doshi. At that time she stated that she was \"nearly back to normal\".  She was interested in resuming downhill skiing this winter.  She has had issues with myalgias in the past with pravastatin, atorvastatin, and rosuvastatin.  Her primary care team had been " "administering a low-dose of rosuvastatin to see if she will be able to tolerate it.  Ms. Gacria had an unpleasant experience in cardiac rehab and decided to instead ramp up her physical activity at home. At her visit with Dr. Dosih they discussed that if she was unable to tolerate rosuvastatin and or it did not decrease her LDL to less than 70, it was advisable to initiate a PCSK9 inhibitor.     Today, Ms. Garcia reports worsening shortness of breath starting in February and feeling very fatigued. Difficulty walking up a few stairs. Notes shortness of breath mostly with exertion. Feels that it is taking her longer to catch her breath with rest. PCP did CXR and EKG which were WNL. Ms. Garcia tells me that she increased lasix to 40 mg 2 days ago and felt a dramatic improvement in her breathing. With exertion she is feeling a discomfort in her neck 'feels like a sore throat\" and then her jaw begins to hurt. She had this symptom prior to her CABG, but more severe. She is very concerned about worsening symptoms. Denies orthopnea, PND, or LE edema. Recently started on diltiazem 120 mg daily and hydrochlorothiazide stopped per PCP. Notes increased episodes of tachycardia/irregular heart beat at nighttime, HR 90s, feels irregular in chest, but manual pulse check feels regular. Does not feel like previous episode of AF. Does not notice any lightheadedness. No syncope. She has gained ~ 5 lbs since CABG. On rosuvastatin, but feels that she is tolerating this very poorly with myalgias and upset stomach.      Cardiac Medications   - Amlodipine 2.5 mg daily   - ASA 81 mg daily   - Lasix 20 mg daily   - Lisinopril 20 mg daily   - Metoprolol succinate 25 mg daily   - Rosuvastatin 5 mg every M/W/F  - Diltiazem 120 mg daily       PAST MEDICAL HISTORY:  Past Medical History:   Diagnosis Date     Anxiety      Centrilobular emphysema (H)      Childhood asthma      Coronary artery disease involving native coronary artery of native heart  "     Esophageal reflux      Essential hypertension      Fatty liver disease      Hair loss      Migraine      Mild aortic stenosis      Mixed hyperlipidemia      MRSA infection     Skin abscesses after COVID vaccinations     Osteoarthritis      Primary insomnia        FAMILY HISTORY:  Family History   Problem Relation Age of Onset     Lung Cancer Mother      LUNG DISEASE Father      Hypothyroidism Sister      Hypothyroidism Sister      Osteosarcoma Sister      Anesthesia Reaction No family hx of        SOCIAL HISTORY:  Social History     Socioeconomic History     Marital status:    Tobacco Use     Smoking status: Former     Packs/day: 1.00     Years: 25.00     Pack years: 25.00     Types: Cigarettes     Quit date: 1992     Years since quittin.2     Smokeless tobacco: Never   Substance and Sexual Activity     Alcohol use: Yes     Comment: socially       CURRENT MEDICATIONS:  acyclovir (ZOVIRAX) 400 MG tablet, Take 400 mg by mouth daily as needed  albuterol (PROAIR HFA/PROVENTIL HFA/VENTOLIN HFA) 108 (90 Base) MCG/ACT inhaler, Inhale 2 puffs into the lungs every 6 hours  amLODIPine (NORVASC) 2.5 MG tablet, Take 1 tablet by mouth daily at 2 pm (Patient not taking: Reported on 2023)  aspirin (ASA) 81 MG chewable tablet, Take 1 tablet (81 mg) by mouth daily  Coenzyme Q10 (CO Q 10) 10 MG CAPS, Take 1 capsule by mouth daily  Ergocalciferol 50 MCG (2000 UT) TABS, 50 mcg every morning  ezetimibe (ZETIA) 10 MG tablet, Take 10 mg by mouth daily (Patient not taking: Reported on 2023)  fish oil-omega-3 fatty acids 1000 MG capsule, Take 2 g by mouth every morning  furosemide (LASIX) 20 MG tablet, Take 1 tablet by mouth daily  gabapentin (NEURONTIN) 100 MG capsule, 200 mg At Bedtime  hydrochlorothiazide (HYDRODIURIL) 50 MG tablet, Take 0.5 tablets (25 mg) by mouth every morning  lisinopril (ZESTRIL) 10 MG tablet, Take 1 tablet (10 mg) by mouth daily (Patient not taking: Reported on 2023)  lisinopril  (ZESTRIL) 20 MG tablet, Take 20 mg by mouth daily  magnesium 100 MG CAPS, 200 mg At Bedtime  methocarbamol (ROBAXIN) 500 MG tablet, Take 2 tablets (1,000 mg) by mouth every 6 hours as needed for muscle spasms or other (pain) (Patient not taking: Reported on 1/6/2023)  metoprolol succinate ER (TOPROL XL) 25 MG 24 hr tablet, Take 25 mg by mouth every morning  mupirocin (BACTROBAN) 2 % external ointment, as needed  nitroGLYcerin (NITROSTAT) 0.4 MG sublingual tablet, 0.4 mg every 5 minutes as needed  nystatin (MYCOSTATIN) 994630 UNIT/ML suspension, Swish and swallow or swish and spit 1 TEASPOONFUL 4 times per day for 10 days  omeprazole (PRILOSEC) 20 MG DR capsule, 20 mg At Bedtime  PARoxetine (PAXIL) 20 MG tablet, Take 20 mg by mouth every morning  potassium chloride ER (KLOR-CON M) 20 MEQ CR tablet, Take 20 mEq by mouth 2 times daily (Patient not taking: Reported on 10/17/2022)  pravastatin (PRAVACHOL) 10 MG tablet, Take 10 mg by mouth every morning (Patient not taking: Reported on 9/20/2022)  rosuvastatin (CRESTOR) 5 MG tablet, Take 1 pill Monday and 1 pill Friday of each week  sodium fluoride dental gel (PREVIDENT) 1.1 % GEL topical gel, Brush 2x/day.  Do not eat or drink for 30 minutes after.  sulfamethoxazole-trimethoprim (BACTRIM) 400-80 MG tablet, Take 1 tablet by mouth 2 times daily (Patient not taking: Reported on 9/30/2022)  zolpidem (AMBIEN) 5 MG tablet, Take 0.5 tablets (2.5 mg) by mouth nightly as needed for sleep  [DISCONTINUED] celecoxib (CELEBREX) 200 MG capsule, Take 200 mg by mouth every morning  [DISCONTINUED] spironolactone (ALDACTONE) 50 MG tablet, 100 mg every morning    No current facility-administered medications on file prior to visit.      ROS:   Refer to HPI    EXAM:  There were no vitals taken for this visit.  GENERAL: Appears comfortable, in no acute distress.   HEENT: Eye symmetrical, no discharge or icterus bilaterally. Mucous membranes moist and without lesions.  CV: RRR, +S1S2, no  murmur, rub, or gallop. JVP near clavicle  RESPIRATORY: Respirations regular, even, and unlabored. Lungs CTA throughout.   GI: Soft and non distended with normoactive bowel sounds present in all quadrants. No tenderness, rebound, guarding. No hepatomegaly.   EXTREMITIES: No peripheral edema. 2+ bilateral pedal pulses.   NEUROLOGIC: Alert and oriented x 3. No focal deficits.   MUSCULOSKELETAL: No joint swelling or tenderness.   SKIN: No jaundice. No rashes or lesions.     Labs, reviewed with patient in clinic today:  CBC RESULTS:  Lab Results   Component Value Date    WBC 9.5 10/17/2022    RBC 4.47 10/17/2022    HGB 12.4 10/17/2022    HCT 39.4 10/17/2022    MCV 88 10/17/2022    MCH 27.7 10/17/2022    MCHC 31.5 10/17/2022    RDW 13.9 10/17/2022     (H) 10/17/2022       CMP RESULTS:  Lab Results   Component Value Date     (L) 10/17/2022    POTASSIUM 4.6 10/17/2022    POTASSIUM 4.1 09/14/2022    CHLORIDE 98 10/17/2022    CO2 25 10/17/2022    ANIONGAP 11 10/17/2022     (H) 10/17/2022     (H) 09/16/2022    BUN 22.6 10/17/2022    CR 0.70 10/17/2022    GFRESTIMATED >90 10/17/2022    ALYCE 9.5 10/17/2022    BILITOTAL 0.3 09/21/2022    ALBUMIN 3.2 (L) 09/21/2022    ALKPHOS 60 09/21/2022    ALT 30 09/21/2022    AST 30 09/21/2022        INR RESULTS:  Lab Results   Component Value Date    INR 1.04 09/20/2022       Lab Results   Component Value Date    MAG 2.2 09/20/2022     No results found for: NTBNPI  Lab Results   Component Value Date    NTBNP 401 10/17/2022       Cardiac Diagnostics:    1/6/2023 Electrocardiogram  NSR, no ischemic changes      Ambulatory ECG monitor from 11/29/2022 to 12/11/2022:      10/5/2022 Echo   Interpretation Summary  Global and regional left ventricular function is hyperkinetic with an EF of  65-70%.  Right ventricular function, chamber size, wall motion, and thickness are  normal.  Mild to moderate aortic insufficiency is present.  Pulmonary artery systolic pressure is  normal.  The inferior vena cava is normal.  No significant changes noted.    8/24/2022 Echo ( Care Everywhere)  Summary     1. The left ventricular size is normal.  Systolic function is normal.     The   estimated ejection fraction is 65%.  Wall thickness is normal.  Left   ventricular segmental wall motion is normal.     2. The right ventricular size is normal.  Systolic function is normal.     3. There is mild aortic valve stenosis with a peak velocity of 269.00   cm/s,   mean gradient of 18 mmHg, and aortic valve area of 1.54 cm2.     4. There is mild aortic valve regurgitation.     5. Compared to prior no significant change in AS.         Assessment and Plan:   Ms. Garcia is a 72 year old female with medical history pertinent for coronary disease (status post CABG in September 2022), hypertension, and former smoking who presents to cardiology clinic for routine follow up.     Symptoms concerning for stable angina with recurrent of ACS symptoms that occurred prior to CABG. Also having increased dyspnea that improves with rest and diuresis. For now we will increase her lasix to 40 mg Tu/Th/Sat, and continue lasix 20 mg M/W/F/Sun. Ms. Garcia is quite concerned about her LOPEZ and jaw pain that occurs with exertion. Will proceed with NM stress test to evaluate for ischemia as well as any change in her EF. We will place a 7 day Zio in clinic to evaluate for arrhythmia. Of note, previous Zio from 12/2022 without AF, single episode of SVT. Plan to repeat labs, (BMP and FLP) in 2 weeks. She is feeling poorly on rosuvastatin, but concerned about the expense of PCSK9. I suspect that LDL on current dose of rosuvastatin will not be at goal and therefore a PCSK9 will be indicated. Ms. Garcia requested that we prescribe Repatha today and she will discuss price with insurance. Additionally, I will discuss possible patient assistance and discount medication options with our cardiology team.       # CAD s/p CABG (9/2022)  # HLD  -  Continue ASA   - Continue BB  - Continue rosuvastatin 5 mg M/W/F for now  - FLP in 2 weeks, goal LDL < 70  - Prescribe Repatha, will start pending results of FLP    # HTN, controlled  - continue current antihypertensives     # Atrial fibrillation, post-operative s/p DCCV and brief AAD therapy with amiodarone   - ECG 1/2023: NSR  - CHADSVASC=+CAD, +age, +gender, +HTN  4, previously on eliquis, stopped in Jan '23 per EP  - Continue metoprolol  - 7 day zio            Follow up:  - Cardiology SEVEN in 4 weeks           Radha Hoff DNP, NP-C  General Cardiology   04/14/23

## 2023-04-14 NOTE — PATIENT INSTRUCTIONS
You were seen today in the Cardiovascular Clinic at the [facility] by:       THUY Roberson     Your visit summary and instructions are as follows:    Labs in 2 weeks (recheck potassium and fasting lipid profile)  Schedule stress test (NM Lexiscan)  Place zio monitor for 1 week  Ordered Repatha, discuss with insurance. I will explore patient assistance and discount options for you.   Increase Lasix to 40 mg T,Th,Sat, and continue lasix 20 mg on M,W,F,Sun  Continue to work toward the recommendation of 150 minutes of moderate intensity exercise/week and maintain a healthy lifestyle (avoid illicit drugs, smoking and moderate alcohol consumption)    You are scheduled for a Nuclear Stress Test at the [facility], [address], [place] [address] on:    This stress test will take approximately 3 hours to complete.   Prepping for test  1. Do not eat for 4 hours prior to your stress test.   2. No caffeine for 12 hours prior to your stress test.   3. No alcohol or tobacco for 12 hours prior to the stress test.   4. Take your usual morning medications with a sip of water the morning of your stress test.        Return to cardiology clinic in 1 month     Thank you for your visit today!     Please MyChart message me or call my nurse if you have any questions or concerns.      During Business Hours:  [phone], option # 1 (University) then option # 4 (medical questions) and ask to speak with my nurse.     After hours, weekends or holidays:   [phone], Option #4  Ask to speak to the On-Call Cardiologist. Inform them you are a cardiology patient at the [facility].

## 2023-04-14 NOTE — PROGRESS NOTES
Per Radha Hoff NP, patient to have 7 day Zio monitor placed.  Diagnosis: PAF [I48.0]  Monitor placed: No  Patient Instructed: Yes  Patient verbalized understanding: Yes  Holter # [id number]    Pt took monitor home to place herself later this evening. Pt instructed on how to wear and place the monitor.

## 2023-04-14 NOTE — LETTER
4/14/2023      RE: Luis Alberto Garcia  [address] [place] [address]       Dear Colleague,    Thank you for the opportunity to participate in the care of your patient, Luis Alberto Garcia, at the [facility] [place] at [facility]. Please see a copy of my visit note below.      [facility]   Cardiology Clinic Note      HPI:   Ms. Garcia is a 72 year old female with medical history pertinent for coronary disease (status post CABG in September 2022), hypertension, and former smoking who presents to cardiology clinic for routine follow up.     Briefly, Ms. Garcia had been experiencing ~10 months of jaw/neck pain and dyspnea on exertion until she had a pharmacologic SPECT at [facility] in July 2022.  While the stress/perfusion part was unremarkable, she exhibited 1 mm downsloping ST depressions in the ECG portion (territory unclear if Care Everywhere records).  She subsequently underwent invasive coronary angiography at [facility], which yielded a 70% proximal LAD disease, with an 80% first diagonal lesion, 80% mid circumflex lesion, 70% OM1 lesion, and a 80% mid RCA lesion.  Ms. Garcia was then referred to cardiac surgery at the [facility] for coronary bypass grafting, which she successfully underwent in September 2022, as below.     Ms. Garcia's postoperative course was complicated by a left-sided pleural effusion that required thoracentesis and postoperative atrial fibrillation.  Ms. Garcia's atrial fibrillation manifested as palpitations, dyspnea, and presyncope.  An ECG in the cardiac surgery clinic demonstrated atrial fibrillation and she was subsequently sent to the ER.  In the emergency room, she underwent DCCV after she was started on amiodarone infusion.  Ms. Garcia was transitioned to oral amiodarone regimen along with apixaban and metoprolol succinate and has since followed up with Dr. López.    Most  "recently, in January 2023 Ms. Garcia established general cardiology care with Dr. Doshi. At that time she stated that she was \"nearly back to normal\".  She was interested in resuming downhill skiing this winter.  She has had issues with myalgias in the past with pravastatin, atorvastatin, and rosuvastatin.  Her primary care team had been administering a low-dose of rosuvastatin to see if she will be able to tolerate it.  Ms. Garcia had an unpleasant experience in cardiac rehab and decided to instead ramp up her physical activity at home. At her visit with Dr. Doshi they discussed that if she was unable to tolerate rosuvastatin and or it did not decrease her LDL to less than 70, it was advisable to initiate a PCSK9 inhibitor.     Today, Ms. Garcia reports worsening shortness of breath starting in February and feeling very fatigued. Difficulty walking up a few stairs. Notes shortness of breath mostly with exertion. Feels that it is taking her longer to catch her breath with rest. PCP did CXR and EKG which were WNL. Ms. Garcia tells me that she increased lasix to 40 mg 2 days ago and felt a dramatic improvement in her breathing. With exertion she is feeling a discomfort in her neck 'feels like a sore throat\" and then her jaw begins to hurt. She had this symptom prior to her CABG, but more severe. She is very concerned about worsening symptoms. Denies orthopnea, PND, or LE edema. Recently started on diltiazem 120 mg daily and hydrochlorothiazide stopped per PCP. Notes increased episodes of tachycardia/irregular heart beat at nighttime, HR 90s, feels irregular in chest, but manual pulse check feels regular. Does not feel like previous episode of AF. Does not notice any lightheadedness. No syncope. She has gained ~ 5 lbs since CABG. On rosuvastatin, but feels that she is tolerating this very poorly with myalgias and upset stomach.      Cardiac Medications   - Amlodipine 2.5 mg daily   - ASA 81 mg daily   - Lasix 20 mg " daily   - Lisinopril 20 mg daily   - Metoprolol succinate 25 mg daily   - Rosuvastatin 5 mg every M//  - Diltiazem 120 mg daily       PAST MEDICAL HISTORY:  Past Medical History:   Diagnosis Date    Anxiety     Centrilobular emphysema (H)     Childhood asthma     Coronary artery disease involving native coronary artery of native heart     Esophageal reflux     Essential hypertension     Fatty liver disease     Hair loss     Migraine     Mild aortic stenosis     Mixed hyperlipidemia     MRSA infection     Skin abscesses after COVID vaccinations    Osteoarthritis     Primary insomnia        FAMILY HISTORY:  Family History   Problem Relation Age of Onset    Lung Cancer Mother     LUNG DISEASE Father     Hypothyroidism Sister     Hypothyroidism Sister     Osteosarcoma Sister     Anesthesia Reaction No family hx of        SOCIAL HISTORY:  Social History     Socioeconomic History    Marital status:    Tobacco Use    Smoking status: Former     Packs/day: 1.00     Years: 25.00     Pack years: 25.00     Types: Cigarettes     Quit date: 1992     Years since quittin.2    Smokeless tobacco: Never   Substance and Sexual Activity    Alcohol use: Yes     Comment: socially       CURRENT MEDICATIONS:  acyclovir (ZOVIRAX) 400 MG tablet, Take 400 mg by mouth daily as needed  albuterol (PROAIR HFA/PROVENTIL HFA/VENTOLIN HFA) 108 (90 Base) MCG/ACT inhaler, Inhale 2 puffs into the lungs every 6 hours  amLODIPine (NORVASC) 2.5 MG tablet, Take 1 tablet by mouth daily at 2 pm (Patient not taking: Reported on 2023)  aspirin (ASA) 81 MG chewable tablet, Take 1 tablet (81 mg) by mouth daily  Coenzyme Q10 (CO Q 10) 10 MG CAPS, Take 1 capsule by mouth daily  Ergocalciferol 50 MCG (2000 UT) TABS, 50 mcg every morning  ezetimibe (ZETIA) 10 MG tablet, Take 10 mg by mouth daily (Patient not taking: Reported on 2023)  fish oil-omega-3 fatty acids 1000 MG capsule, Take 2 g by mouth every morning  furosemide (LASIX) 20 MG  tablet, Take 1 tablet by mouth daily  gabapentin (NEURONTIN) 100 MG capsule, 200 mg At Bedtime  hydrochlorothiazide (HYDRODIURIL) 50 MG tablet, Take 0.5 tablets (25 mg) by mouth every morning  lisinopril (ZESTRIL) 10 MG tablet, Take 1 tablet (10 mg) by mouth daily (Patient not taking: Reported on 1/13/2023)  lisinopril (ZESTRIL) 20 MG tablet, Take 20 mg by mouth daily  magnesium 100 MG CAPS, 200 mg At Bedtime  methocarbamol (ROBAXIN) 500 MG tablet, Take 2 tablets (1,000 mg) by mouth every 6 hours as needed for muscle spasms or other (pain) (Patient not taking: Reported on 1/6/2023)  metoprolol succinate ER (TOPROL XL) 25 MG 24 hr tablet, Take 25 mg by mouth every morning  mupirocin (BACTROBAN) 2 % external ointment, as needed  nitroGLYcerin (NITROSTAT) 0.4 MG sublingual tablet, 0.4 mg every 5 minutes as needed  nystatin (MYCOSTATIN) 051822 UNIT/ML suspension, Swish and swallow or swish and spit 1 TEASPOONFUL 4 times per day for 10 days  omeprazole (PRILOSEC) 20 MG DR capsule, 20 mg At Bedtime  PARoxetine (PAXIL) 20 MG tablet, Take 20 mg by mouth every morning  potassium chloride ER (KLOR-CON M) 20 MEQ CR tablet, Take 20 mEq by mouth 2 times daily (Patient not taking: Reported on 10/17/2022)  pravastatin (PRAVACHOL) 10 MG tablet, Take 10 mg by mouth every morning (Patient not taking: Reported on 9/20/2022)  rosuvastatin (CRESTOR) 5 MG tablet, Take 1 pill Monday and 1 pill Friday of each week  sodium fluoride dental gel (PREVIDENT) 1.1 % GEL topical gel, Brush 2x/day.  Do not eat or drink for 30 minutes after.  sulfamethoxazole-trimethoprim (BACTRIM) 400-80 MG tablet, Take 1 tablet by mouth 2 times daily (Patient not taking: Reported on 9/30/2022)  zolpidem (AMBIEN) 5 MG tablet, Take 0.5 tablets (2.5 mg) by mouth nightly as needed for sleep  [DISCONTINUED] celecoxib (CELEBREX) 200 MG capsule, Take 200 mg by mouth every morning  [DISCONTINUED] spironolactone (ALDACTONE) 50 MG tablet, 100 mg every morning    No current  facility-administered medications on file prior to visit.      ROS:   Refer to HPI    EXAM:  There were no vitals taken for this visit.  GENERAL: Appears comfortable, in no acute distress.   HEENT: Eye symmetrical, no discharge or icterus bilaterally. Mucous membranes moist and without lesions.  CV: RRR, +S1S2, no murmur, rub, or gallop. JVP near clavicle  RESPIRATORY: Respirations regular, even, and unlabored. Lungs CTA throughout.   GI: Soft and non distended with normoactive bowel sounds present in all quadrants. No tenderness, rebound, guarding. No hepatomegaly.   EXTREMITIES: No peripheral edema. 2+ bilateral pedal pulses.   NEUROLOGIC: Alert and oriented x 3. No focal deficits.   MUSCULOSKELETAL: No joint swelling or tenderness.   SKIN: No jaundice. No rashes or lesions.     Labs, reviewed with patient in clinic today:  CBC RESULTS:  Lab Results   Component Value Date    WBC 9.5 10/17/2022    RBC 4.47 10/17/2022    HGB 12.4 10/17/2022    HCT 39.4 10/17/2022    MCV 88 10/17/2022    MCH 27.7 10/17/2022    MCHC 31.5 10/17/2022    RDW 13.9 10/17/2022     (H) 10/17/2022       CMP RESULTS:  Lab Results   Component Value Date     (L) 10/17/2022    POTASSIUM 4.6 10/17/2022    POTASSIUM 4.1 09/14/2022    CHLORIDE 98 10/17/2022    CO2 25 10/17/2022    ANIONGAP 11 10/17/2022     (H) 10/17/2022     (H) 09/16/2022    BUN 22.6 10/17/2022    CR 0.70 10/17/2022    GFRESTIMATED >90 10/17/2022    ALYCE 9.5 10/17/2022    BILITOTAL 0.3 09/21/2022    ALBUMIN 3.2 (L) 09/21/2022    ALKPHOS 60 09/21/2022    ALT 30 09/21/2022    AST 30 09/21/2022        INR RESULTS:  Lab Results   Component Value Date    INR 1.04 09/20/2022       Lab Results   Component Value Date    MAG 2.2 09/20/2022     No results found for: NTBNPI  Lab Results   Component Value Date    NTBNP 401 10/17/2022       Cardiac Diagnostics:    1/6/2023 Electrocardiogram  NSR, no ischemic changes      Ambulatory ECG monitor from 11/29/2022 to  12/11/2022:      10/5/2022 Echo   Interpretation Summary  Global and regional left ventricular function is hyperkinetic with an EF of  65-70%.  Right ventricular function, chamber size, wall motion, and thickness are  normal.  Mild to moderate aortic insufficiency is present.  Pulmonary artery systolic pressure is normal.  The inferior vena cava is normal.  No significant changes noted.    8/24/2022 Echo ( Care Everywhere)  Summary     1. The left ventricular size is normal.  Systolic function is normal.     The   estimated ejection fraction is 65%.  Wall thickness is normal.  Left   ventricular segmental wall motion is normal.     2. The right ventricular size is normal.  Systolic function is normal.     3. There is mild aortic valve stenosis with a peak velocity of 269.00   cm/s,   mean gradient of 18 mmHg, and aortic valve area of 1.54 cm2.     4. There is mild aortic valve regurgitation.     5. Compared to prior no significant change in AS.         Assessment and Plan:   Ms. Garcia is a 72 year old female with medical history pertinent for coronary disease (status post CABG in September 2022), hypertension, and former smoking who presents to cardiology clinic for routine follow up.     Symptoms concerning for stable angina with recurrent of ACS symptoms that occurred prior to CABG. Also having increased dyspnea that improves with rest and diuresis. For now we will increase her lasix to 40 mg Tu/Th/Sat, and continue lasix 20 mg M/W/F/Sun. Ms. Garcia is quite concerned about her LOPEZ and jaw pain that occurs with exertion. Will proceed with NM stress test to evaluate for ischemia as well as any change in her EF. We will place a 7 day Zio in clinic to evaluate for arrhythmia. Of note, previous Zio from 12/2022 without AF, single episode of SVT. Plan to repeat labs, (BMP and FLP) in 2 weeks. She is feeling poorly on rosuvastatin, but concerned about the expense of PCSK9. I suspect that LDL on current dose of  rosuvastatin will not be at goal and therefore a PCSK9 will be indicated. Ms. Garcia requested that we prescribe Repatha today and she will discuss price with insurance. Additionally, I will discuss possible patient assistance and discount medication options with our cardiology team.       # CAD s/p CABG (9/2022)  # HLD  - Continue ASA   - Continue BB  - Continue rosuvastatin 5 mg M/W/F for now  - FLP in 2 weeks, goal LDL < 70  - Prescribe Repatha, will start pending results of FLP    # HTN, controlled  - continue current antihypertensives     # Atrial fibrillation, post-operative s/p DCCV and brief AAD therapy with amiodarone   - ECG 1/2023: NSR  - CHADSVASC=+CAD, +age, +gender, +HTN  4, previously on eliquis, stopped in Jan '23 per EP  - Continue metoprolol  - 7 day zio            Follow up:  - Cardiology SEVEN in 4 weeks           Radha Hoff DNP, NP-C  General Cardiology   04/14/23

## 2023-04-27 ENCOUNTER — TELEPHONE (OUTPATIENT)
Dept: CARDIOLOGY | Facility: CLINIC | Age: 73
End: 2023-04-27
Payer: COMMERCIAL

## 2023-04-27 NOTE — TELEPHONE ENCOUNTER
Update: no fasting labs needed per Marylou REN, pt will need lexiscan and will have to move appt out if scheduling past 5/12 [facility]

## 2023-04-27 NOTE — TELEPHONE ENCOUNTER
----- Message from Jerome Randall sent at 4/26/2023  5:03 PM CDT -----  Regarding: Scheduling labs and stress test  Good afternoon,     This pt looks to be needing fasting labs in the next couple of days.   They are also needing a stress test before their apt on 5/12 with Radha Hoff.     Thanks,   Jerome

## 2023-05-01 ENCOUNTER — TELEPHONE (OUTPATIENT)
Dept: CARDIOLOGY | Facility: CLINIC | Age: 73
End: 2023-05-01
Payer: COMMERCIAL

## 2023-05-01 NOTE — TELEPHONE ENCOUNTER
[facility] Call Center    Phone Message    May a detailed message be left on voicemail: yes     Reason for Call: Other: Luis Alberto called requesting to schedule both her NM Stress Test and required labs but would like to do them closer to home at her local [facility] clinic. Please fax the orders for Luis Alberto's NM Stress Test and labs to [facility] located at [address], [place] [address]    Phone number: [phone]    Thank you!    Action Taken: Other: Cardiology    Travel Screening: Not Applicable     Thank you!  [facility]

## 2023-05-01 NOTE — TELEPHONE ENCOUNTER
Labs and stress test orders sent to [facility]. Let patient know that I had sent orders and to please call and schedule. Let her know if she can't get in before 05/12, recommend moving her appt back with Radha Hoff. Pt verbalizes understanding and will let us know yojana she is scheduled.

## 2023-05-03 NOTE — TELEPHONE ENCOUNTER
5/3 lvm on home, spoke with pt on mobile, pt is aware the scan is needed, but wants to do it at [facility], messaged Marylou OLSEN

## 2023-05-05 NOTE — TELEPHONE ENCOUNTER
Patient states that [facility] did not receive the stress test and lab orders, please re-send .

## 2023-05-11 ENCOUNTER — CARE COORDINATION (OUTPATIENT)
Dept: CARDIOLOGY | Facility: CLINIC | Age: 73
End: 2023-05-11
Payer: COMMERCIAL

## 2023-05-11 NOTE — PROGRESS NOTES
LVM to see if pt was able to get testing scheduled with [facility] and completed. If not completed, recommend moving appointment with Radha Hoff until after testing. Huoshi message also sent to pt. Call back number provided.

## 2023-05-12 ENCOUNTER — OFFICE VISIT (OUTPATIENT)
Dept: CARDIOLOGY | Facility: CLINIC | Age: 73
End: 2023-05-12
Attending: NURSE PRACTITIONER
Payer: COMMERCIAL

## 2023-05-12 VITALS
DIASTOLIC BLOOD PRESSURE: 63 MMHG | WEIGHT: 189.7 LBS | SYSTOLIC BLOOD PRESSURE: 118 MMHG | BODY MASS INDEX: 30.16 KG/M2 | HEART RATE: 73 BPM | OXYGEN SATURATION: 97 %

## 2023-05-12 DIAGNOSIS — Z95.1 S/P CABG (CORONARY ARTERY BYPASS GRAFT): ICD-10-CM

## 2023-05-12 DIAGNOSIS — R06.02 SHORTNESS OF BREATH: ICD-10-CM

## 2023-05-12 DIAGNOSIS — I25.118 CORONARY ARTERY DISEASE OF NATIVE ARTERY OF NATIVE HEART WITH STABLE ANGINA PECTORIS (H): Primary | ICD-10-CM

## 2023-05-12 PROCEDURE — 99214 OFFICE O/P EST MOD 30 MIN: CPT | Performed by: NURSE PRACTITIONER

## 2023-05-12 PROCEDURE — G0463 HOSPITAL OUTPT CLINIC VISIT: HCPCS | Performed by: NURSE PRACTITIONER

## 2023-05-12 RX ORDER — DILTIAZEM HYDROCHLORIDE 120 MG/1
120 CAPSULE, EXTENDED RELEASE ORAL DAILY
COMMUNITY
Start: 2023-04-05

## 2023-05-12 ASSESSMENT — PAIN SCALES - GENERAL: PAINLEVEL: NO PAIN (0)

## 2023-05-12 NOTE — PROGRESS NOTES
"  [facility] [place] [facility] [place]      HPI:   Ms. Garcia is a 72 year old female with medical history pertinent for coronary disease (status post CABG in September 2022), hypertension, and former smoking who presents to cardiology clinic for routine follow up.     Briefly, Ms. Garcia had been experiencing ~10 months of jaw/neck pain and dyspnea on exertion until she had a pharmacologic SPECT at [facility] in July 2022.  While the stress/perfusion part was unremarkable, she exhibited 1 mm downsloping ST depressions in the ECG portion (territory unclear if Care Everywhere records).  She subsequently underwent invasive coronary angiography at [facility], which yielded a 70% proximal LAD disease, with an 80% first diagonal lesion, 80% mid circumflex lesion, 70% OM1 lesion, and a 80% mid RCA lesion.  Ms. Garcia was then referred to cardiac surgery at the [facility] for coronary bypass grafting, which she successfully underwent in September 2022, as below.     Ms. Garcia's postoperative course was complicated by a left-sided pleural effusion that required thoracentesis and postoperative atrial fibrillation.  Ms. Garcia's atrial fibrillation manifested as palpitations, dyspnea, and presyncope.  An ECG in the cardiac surgery clinic demonstrated atrial fibrillation and she was subsequently sent to the ER.  In the emergency room, she underwent DCCV after she was started on amiodarone infusion.  Ms. Garcia was transitioned to oral amiodarone regimen along with apixaban and metoprolol succinate and has since followed up with Dr. López.    Most recently, in January 2023 Ms. Garcia established general cardiology care with Dr. Doshi. At that time she stated that she was \"nearly back to normal\".  She was interested in resuming downhill skiing this winter.  She has had issues with myalgias in the past with pravastatin, atorvastatin, and rosuvastatin.  Her primary care team had been " "administering a low-dose of rosuvastatin to see if she will be able to tolerate it.  Ms. Garcia had an unpleasant experience in cardiac rehab and decided to instead ramp up her physical activity at home. At her visit with Dr. Doshi they discussed that if she was unable to tolerate rosuvastatin and or it did not decrease her LDL to less than 70, it was advisable to initiate a PCSK9 inhibitor.     At our visit in April 2023, Ms. Garcia was quite concerned about worsening symptoms that included fatigue, dyspnea on exertion, palpitations, and neck and jaw pain. First noticed symptoms in February. Reported breathlessness after walking up a few stairs and that with exertion she was feeling a discomfort in her neck that \"feels like a sore throat\" and then jaw begins hurting. She had this type of neck/jaw pain prior to her CABG, but at a more severe degree. Initial evaluation completed by her PCP and included a CXR and EKG which were normal. Ms. Garcia increased her lasix to 40 mg and felt a dramatic improvement in her breathing.  Denied any other HF symptoms. Given these symptoms, elected to proceed with NM stress test and ambulatory monitor. NM stress test was completed through [facility] and was negative for ischemia. EF 74%. She also recently completed ABIs which were also normal. Reported stomach upset with rosuvastatin which she was only taking 5 mg M/W/F. Given CAD s/p CABG her LDL goal is < 70. Recommended switching to PCSK9.    Today, Ms. Garcia reports feeling okay. Continues to have dyspnea on exertion, but feeling less tired and no reports of palpitations. She is feeling very reassured by normal stress test. Chief complaint is upset stomach, made worse on days she takes statin, as well as increased bowel motility, and change in tastes. Tells me that GI issues have been present since CABG surgery. She discussed starting Repatha with insurance. Limited coverage. Price per dose $400. She does not qualify for " patient assistance.    Cardiac Medications   - Amlodipine 2.5 mg daily   - ASA 81 mg daily   - Lasix 20 mg daily   - Lisinopril 20 mg daily   - Metoprolol succinate 25 mg daily   - Rosuvastatin 5 mg every //  - Diltiazem 120 mg daily       PAST MEDICAL HISTORY:  Past Medical History:   Diagnosis Date     Anxiety      Centrilobular emphysema (H)      Childhood asthma      Coronary artery disease involving native coronary artery of native heart      Esophageal reflux      Essential hypertension      Fatty liver disease      Hair loss      Migraine      Mild aortic stenosis      Mixed hyperlipidemia      MRSA infection     Skin abscesses after COVID vaccinations     Osteoarthritis      Primary insomnia        FAMILY HISTORY:  Family History   Problem Relation Age of Onset     Lung Cancer Mother      LUNG DISEASE Father      Hypothyroidism Sister      Hypothyroidism Sister      Osteosarcoma Sister      Anesthesia Reaction No family hx of        SOCIAL HISTORY:  Social History     Socioeconomic History     Marital status:    Tobacco Use     Smoking status: Former     Packs/day: 1.00     Years: 25.00     Pack years: 25.00     Types: Cigarettes     Quit date: 1992     Years since quittin.2     Smokeless tobacco: Never   Substance and Sexual Activity     Alcohol use: Yes     Comment: socially       CURRENT MEDICATIONS:  acyclovir (ZOVIRAX) 400 MG tablet, Take 400 mg by mouth daily as needed  albuterol (PROAIR HFA/PROVENTIL HFA/VENTOLIN HFA) 108 (90 Base) MCG/ACT inhaler, Inhale 2 puffs into the lungs every 6 hours  amLODIPine (NORVASC) 2.5 MG tablet, Take 1 tablet by mouth daily at 2 pm  aspirin (ASA) 81 MG chewable tablet, Take 1 tablet (81 mg) by mouth daily  Coenzyme Q10 (CO Q 10) 10 MG CAPS, Take 1 capsule by mouth daily  Ergocalciferol 50 MCG (2000 UT) TABS, 50 mcg every morning  evolocumab (REPATHA) 140 MG/ML prefilled autoinjector, Inject 1 mL (140 mg) Subcutaneous every 14 days  fish oil-omega-3  fatty acids 1000 MG capsule, Take 2 g by mouth every morning  furosemide (LASIX) 20 MG tablet, Take lasix 20 mg on M/W/F/Sun and lasix 40 mg on T/Th/Sat  gabapentin (NEURONTIN) 100 MG capsule, 200 mg At Bedtime  lisinopril (ZESTRIL) 20 MG tablet, Take 20 mg by mouth daily  magnesium 100 MG CAPS, 200 mg At Bedtime  methocarbamol (ROBAXIN) 500 MG tablet, Take 2 tablets (1,000 mg) by mouth every 6 hours as needed for muscle spasms or other (pain) (Patient not taking: Reported on 1/6/2023)  metoprolol succinate ER (TOPROL XL) 25 MG 24 hr tablet, Take 25 mg by mouth every morning  mupirocin (BACTROBAN) 2 % external ointment, as needed  nitroGLYcerin (NITROSTAT) 0.4 MG sublingual tablet, 0.4 mg every 5 minutes as needed  nystatin (MYCOSTATIN) 231880 UNIT/ML suspension, Swish and swallow or swish and spit 1 TEASPOONFUL 4 times per day for 10 days  omeprazole (PRILOSEC) 20 MG DR capsule, 20 mg At Bedtime  PARoxetine (PAXIL) 20 MG tablet, Take 20 mg by mouth every morning  rosuvastatin (CRESTOR) 5 MG tablet, Take 1 pill Monday and 1 pill Friday of each week  sodium fluoride dental gel (PREVIDENT) 1.1 % GEL topical gel, Brush 2x/day.  Do not eat or drink for 30 minutes after.  sulfamethoxazole-trimethoprim (BACTRIM) 400-80 MG tablet, Take 1 tablet by mouth 2 times daily (Patient not taking: Reported on 9/30/2022)  zolpidem (AMBIEN) 5 MG tablet, Take 0.5 tablets (2.5 mg) by mouth nightly as needed for sleep  [DISCONTINUED] celecoxib (CELEBREX) 200 MG capsule, Take 200 mg by mouth every morning  [DISCONTINUED] spironolactone (ALDACTONE) 50 MG tablet, 100 mg every morning    No current facility-administered medications on file prior to visit.      ROS:   Refer to HPI    EXAM:  /63 (BP Location: Right arm, Patient Position: Chair, Cuff Size: Adult Regular)   Pulse 73   Wt 86 kg (189 lb 11.2 oz)   SpO2 97%   BMI 30.16 kg/m     GENERAL: Appears comfortable, in no acute distress.   HEENT: Eye symmetrical, no discharge or  icterus bilaterally. Mucous membranes moist and without lesions.  CV: RRR, +S1S2, no murmur, rub, or gallop. JVP near clavicle  RESPIRATORY: Respirations regular, even, and unlabored. Lungs CTA throughout.   GI: Soft and non distended with normoactive bowel sounds present in all quadrants. No tenderness, rebound, guarding. No hepatomegaly.   EXTREMITIES: No peripheral edema. 2+ bilateral pedal pulses.   NEUROLOGIC: Alert and oriented x 3. No focal deficits.   MUSCULOSKELETAL: No joint swelling or tenderness.   SKIN: No jaundice. No rashes or lesions.     Labs, reviewed with patient in clinic today:  CBC RESULTS:  Lab Results   Component Value Date    WBC 9.5 10/17/2022    RBC 4.47 10/17/2022    HGB 12.4 10/17/2022    HCT 39.4 10/17/2022    MCV 88 10/17/2022    MCH 27.7 10/17/2022    MCHC 31.5 10/17/2022    RDW 13.9 10/17/2022     (H) 10/17/2022       CMP RESULTS:  Lab Results   Component Value Date     (L) 10/17/2022    POTASSIUM 4.6 10/17/2022    POTASSIUM 4.1 09/14/2022    CHLORIDE 98 10/17/2022    CO2 25 10/17/2022    ANIONGAP 11 10/17/2022     (H) 10/17/2022     (H) 09/16/2022    BUN 22.6 10/17/2022    CR 0.70 10/17/2022    GFRESTIMATED >90 10/17/2022    ALYCE 9.5 10/17/2022    BILITOTAL 0.3 09/21/2022    ALBUMIN 3.2 (L) 09/21/2022    ALKPHOS 60 09/21/2022    ALT 30 09/21/2022    AST 30 09/21/2022        INR RESULTS:  Lab Results   Component Value Date    INR 1.04 09/20/2022       Lab Results   Component Value Date    MAG 2.2 09/20/2022     No results found for: NTBNPI  Lab Results   Component Value Date    NTBNP 401 10/17/2022       Cardiac Diagnostics:    5/11/2023 NM Lexiscan ([facility]where)  Summary     1. A regadenoson infusion pharmacologic stress test was performed.     2. Functional capacity was not assessed.     3. Shortness of breath and jaw/throat discomfort with infusion.     4. Negative stress ECG for ST segment depression.     5. Myocardial perfusion imaging is normal.      6. Normal left ventricular systolic function. Calculated LVEF 74 %.     7. Based on this study, the annual cardiovascular mortality rate is low   (less than 1%).     Prior Study Comparison     Compared to the prior study from 7/2022, the current study reveals no   significant change.       1/6/2023 Electrocardiogram  NSR, no ischemic changes    Ambulatory ECG monitor from 11/29/2022 to 12/11/2022:      10/5/2022 Echo   Interpretation Summary  Global and regional left ventricular function is hyperkinetic with an EF of  65-70%.  Right ventricular function, chamber size, wall motion, and thickness are  normal.  Mild to moderate aortic insufficiency is present.  Pulmonary artery systolic pressure is normal.  The inferior vena cava is normal.  No significant changes noted.    8/24/2022 Echo ( [facility] Everywhere)  Summary     1. The left ventricular size is normal.  Systolic function is normal.     The   estimated ejection fraction is 65%.  Wall thickness is normal.  Left   ventricular segmental wall motion is normal.     2. The right ventricular size is normal.  Systolic function is normal.     3. There is mild aortic valve stenosis with a peak velocity of 269.00   cm/s,   mean gradient of 18 mmHg, and aortic valve area of 1.54 cm2.     4. There is mild aortic valve regurgitation.     5. Compared to prior no significant change in AS.         Assessment and Plan:   Ms. Garcia is a 72 year old female with medical history pertinent for coronary disease (status post CABG in September 2022), hypertension, and former smoking who presents to cardiology clinic for routine follow up.     Appearing well. Discussed NM stress test which is negative for ischemia with preserved EF. Do not suspect exertional dyspnea is cardiac in origin. May be some component of deconditioning. Previous PFTs did show emphysema. Recommend following up with her PCP for evaluation of dyspnea as well as GI issues. Awaiting Zio results. Repatha is cost  prohibitive. Continue rosuvastatin 5 mg M/W/F.       # CAD s/p CABG (9/2022)  # HLD, goal LDL < 70   - Continue ASA   - Continue BB  - Continue rosuvastatin 5 mg M/W/F for now  - Prescribe Repatha, will start pending results of FLP-> FLP 5/11 with LDL 91    # HTN, controlled  - continue current antihypertensives     # Atrial fibrillation, post-operative s/p DCCV and brief AAD therapy with amiodarone   - ECG 1/2023: NSR  - CHADSVASC=+CAD, +age, +gender, +HTN  4, previously on eliquis, stopped in Jan '23 per EP  - Continue metoprolol  - 7 day zio in process           Follow up:  - Cardiology annually, sooner if needed      Radha Hoff, BARBIE, NP-C  General Cardiology   5/12/2023

## 2023-05-12 NOTE — NURSING NOTE
Chief Complaint   Patient presents with     Follow Up     Return general cardiology pt 4 week follow-up     Vitals were taken and medications reconciled.    SHRUTI Pittman  12:44 PM

## 2023-05-12 NOTE — LETTER
5/12/2023      RE: Luis Alberto Garcia  [address] [place] [address]       Dear Colleague,    Thank you for the opportunity to participate in the care of your patient, Luis Alberto Garcia, at the [facility] [place] at [facility]. Please see a copy of my visit note below.      [facility] [place] [facility] [place]      HPI:   Ms. Garcia is a 72 year old female with medical history pertinent for coronary disease (status post CABG in September 2022), hypertension, and former smoking who presents to cardiology clinic for routine follow up.     Briefly, Ms. Garcia had been experiencing ~10 months of jaw/neck pain and dyspnea on exertion until she had a pharmacologic SPECT at [facility] in July 2022.  While the stress/perfusion part was unremarkable, she exhibited 1 mm downsloping ST depressions in the ECG portion (territory unclear if Care Everywhere records).  She subsequently underwent invasive coronary angiography at [facility], which yielded a 70% proximal LAD disease, with an 80% first diagonal lesion, 80% mid circumflex lesion, 70% OM1 lesion, and a 80% mid RCA lesion.  Ms. Garcia was then referred to cardiac surgery at the [facility] for coronary bypass grafting, which she successfully underwent in September 2022, as below.     Ms. Garcia's postoperative course was complicated by a left-sided pleural effusion that required thoracentesis and postoperative atrial fibrillation.  Ms. Garcia's atrial fibrillation manifested as palpitations, dyspnea, and presyncope.  An ECG in the cardiac surgery clinic demonstrated atrial fibrillation and she was subsequently sent to the ER.  In the emergency room, she underwent DCCV after she was started on amiodarone infusion.  Ms. Garcia was transitioned to oral amiodarone regimen along with apixaban and metoprolol succinate and has since followed up with Dr. López.    Most  "recently, in January 2023 Ms. Garcia established general cardiology care with Dr. Doshi. At that time she stated that she was \"nearly back to normal\".  She was interested in resuming downhill skiing this winter.  She has had issues with myalgias in the past with pravastatin, atorvastatin, and rosuvastatin.  Her primary care team had been administering a low-dose of rosuvastatin to see if she will be able to tolerate it.  Ms. Garcia had an unpleasant experience in cardiac rehab and decided to instead ramp up her physical activity at home. At her visit with Dr. Doshi they discussed that if she was unable to tolerate rosuvastatin and or it did not decrease her LDL to less than 70, it was advisable to initiate a PCSK9 inhibitor.     At our visit in April 2023, Ms. Garcia was quite concerned about worsening symptoms that included fatigue, dyspnea on exertion, palpitations, and neck and jaw pain. First noticed symptoms in February. Reported breathlessness after walking up a few stairs and that with exertion she was feeling a discomfort in her neck that \"feels like a sore throat\" and then jaw begins hurting. She had this type of neck/jaw pain prior to her CABG, but at a more severe degree. Initial evaluation completed by her PCP and included a CXR and EKG which were normal. Ms. Garcia increased her lasix to 40 mg and felt a dramatic improvement in her breathing.  Denied any other HF symptoms. Given these symptoms, elected to proceed with NM stress test and ambulatory monitor. NM stress test was completed through [employer] and was negative for ischemia. EF 74%. She also recently completed ABIs which were also normal. Reported stomach upset with rosuvastatin which she was only taking 5 mg M/W/F. Given CAD s/p CABG her LDL goal is < 70. Recommended switching to PCSK9.    Today, Ms. Garcia reports feeling okay. Continues to have dyspnea on exertion, but feeling less tired and no reports of palpitations. She is feeling " very reassured by normal stress test. Chief complaint is upset stomach, made worse on days she takes statin, as well as increased bowel motility, and change in tastes. Tells me that GI issues have been present since CABG surgery. She discussed starting Repatha with insurance. Limited coverage. Price per dose $400. She does not qualify for patient assistance.    Cardiac Medications   - Amlodipine 2.5 mg daily   - ASA 81 mg daily   - Lasix 20 mg daily   - Lisinopril 20 mg daily   - Metoprolol succinate 25 mg daily   - Rosuvastatin 5 mg every //  - Diltiazem 120 mg daily       PAST MEDICAL HISTORY:  Past Medical History:   Diagnosis Date    Anxiety     Centrilobular emphysema (H)     Childhood asthma     Coronary artery disease involving native coronary artery of native heart     Esophageal reflux     Essential hypertension     Fatty liver disease     Hair loss     Migraine     Mild aortic stenosis     Mixed hyperlipidemia     MRSA infection     Skin abscesses after COVID vaccinations    Osteoarthritis     Primary insomnia        FAMILY HISTORY:  Family History   Problem Relation Age of Onset    Lung Cancer Mother     LUNG DISEASE Father     Hypothyroidism Sister     Hypothyroidism Sister     Osteosarcoma Sister     Anesthesia Reaction No family hx of        SOCIAL HISTORY:  Social History     Socioeconomic History    Marital status:    Tobacco Use    Smoking status: Former     Packs/day: 1.00     Years: 25.00     Pack years: 25.00     Types: Cigarettes     Quit date: 1992     Years since quittin.2    Smokeless tobacco: Never   Substance and Sexual Activity    Alcohol use: Yes     Comment: socially       CURRENT MEDICATIONS:  acyclovir (ZOVIRAX) 400 MG tablet, Take 400 mg by mouth daily as needed  albuterol (PROAIR HFA/PROVENTIL HFA/VENTOLIN HFA) 108 (90 Base) MCG/ACT inhaler, Inhale 2 puffs into the lungs every 6 hours  amLODIPine (NORVASC) 2.5 MG tablet, Take 1 tablet by mouth daily at 2  pm  aspirin (ASA) 81 MG chewable tablet, Take 1 tablet (81 mg) by mouth daily  Coenzyme Q10 (CO Q 10) 10 MG CAPS, Take 1 capsule by mouth daily  Ergocalciferol 50 MCG (2000 UT) TABS, 50 mcg every morning  evolocumab (REPATHA) 140 MG/ML prefilled autoinjector, Inject 1 mL (140 mg) Subcutaneous every 14 days  fish oil-omega-3 fatty acids 1000 MG capsule, Take 2 g by mouth every morning  furosemide (LASIX) 20 MG tablet, Take lasix 20 mg on M/W/F/Sun and lasix 40 mg on T/Th/Sat  gabapentin (NEURONTIN) 100 MG capsule, 200 mg At Bedtime  lisinopril (ZESTRIL) 20 MG tablet, Take 20 mg by mouth daily  magnesium 100 MG CAPS, 200 mg At Bedtime  methocarbamol (ROBAXIN) 500 MG tablet, Take 2 tablets (1,000 mg) by mouth every 6 hours as needed for muscle spasms or other (pain) (Patient not taking: Reported on 1/6/2023)  metoprolol succinate ER (TOPROL XL) 25 MG 24 hr tablet, Take 25 mg by mouth every morning  mupirocin (BACTROBAN) 2 % external ointment, as needed  nitroGLYcerin (NITROSTAT) 0.4 MG sublingual tablet, 0.4 mg every 5 minutes as needed  nystatin (MYCOSTATIN) 917916 UNIT/ML suspension, Swish and swallow or swish and spit 1 TEASPOONFUL 4 times per day for 10 days  omeprazole (PRILOSEC) 20 MG DR capsule, 20 mg At Bedtime  PARoxetine (PAXIL) 20 MG tablet, Take 20 mg by mouth every morning  rosuvastatin (CRESTOR) 5 MG tablet, Take 1 pill Monday and 1 pill Friday of each week  sodium fluoride dental gel (PREVIDENT) 1.1 % GEL topical gel, Brush 2x/day.  Do not eat or drink for 30 minutes after.  sulfamethoxazole-trimethoprim (BACTRIM) 400-80 MG tablet, Take 1 tablet by mouth 2 times daily (Patient not taking: Reported on 9/30/2022)  zolpidem (AMBIEN) 5 MG tablet, Take 0.5 tablets (2.5 mg) by mouth nightly as needed for sleep  [DISCONTINUED] celecoxib (CELEBREX) 200 MG capsule, Take 200 mg by mouth every morning  [DISCONTINUED] spironolactone (ALDACTONE) 50 MG tablet, 100 mg every morning    No current facility-administered  medications on file prior to visit.      ROS:   Refer to HPI    EXAM:  /63 (BP Location: Right arm, Patient Position: Chair, Cuff Size: Adult Regular)   Pulse 73   Wt 86 kg (189 lb 11.2 oz)   SpO2 97%   BMI 30.16 kg/m     GENERAL: Appears comfortable, in no acute distress.   HEENT: Eye symmetrical, no discharge or icterus bilaterally. Mucous membranes moist and without lesions.  CV: RRR, +S1S2, no murmur, rub, or gallop. JVP near clavicle  RESPIRATORY: Respirations regular, even, and unlabored. Lungs CTA throughout.   GI: Soft and non distended with normoactive bowel sounds present in all quadrants. No tenderness, rebound, guarding. No hepatomegaly.   EXTREMITIES: No peripheral edema. 2+ bilateral pedal pulses.   NEUROLOGIC: Alert and oriented x 3. No focal deficits.   MUSCULOSKELETAL: No joint swelling or tenderness.   SKIN: No jaundice. No rashes or lesions.     Labs, reviewed with patient in clinic today:  CBC RESULTS:  Lab Results   Component Value Date    WBC 9.5 10/17/2022    RBC 4.47 10/17/2022    HGB 12.4 10/17/2022    HCT 39.4 10/17/2022    MCV 88 10/17/2022    MCH 27.7 10/17/2022    MCHC 31.5 10/17/2022    RDW 13.9 10/17/2022     (H) 10/17/2022       CMP RESULTS:  Lab Results   Component Value Date     (L) 10/17/2022    POTASSIUM 4.6 10/17/2022    POTASSIUM 4.1 09/14/2022    CHLORIDE 98 10/17/2022    CO2 25 10/17/2022    ANIONGAP 11 10/17/2022     (H) 10/17/2022     (H) 09/16/2022    BUN 22.6 10/17/2022    CR 0.70 10/17/2022    GFRESTIMATED >90 10/17/2022    ALYCE 9.5 10/17/2022    BILITOTAL 0.3 09/21/2022    ALBUMIN 3.2 (L) 09/21/2022    ALKPHOS 60 09/21/2022    ALT 30 09/21/2022    AST 30 09/21/2022        INR RESULTS:  Lab Results   Component Value Date    INR 1.04 09/20/2022       Lab Results   Component Value Date    MAG 2.2 09/20/2022     No results found for: NTBNPI  Lab Results   Component Value Date    NTBNP 401 10/17/2022       Cardiac Diagnostics:    5/11/2023  [facility]  Summary     1. A regadenoson infusion pharmacologic stress test was performed.     2. Functional capacity was not assessed.     3. Shortness of breath and jaw/throat discomfort with infusion.     4. Negative stress ECG for ST segment depression.     5. Myocardial perfusion imaging is normal.     6. Normal left ventricular systolic function. Calculated LVEF 74 %.     7. Based on this study, the annual cardiovascular mortality rate is low   (less than 1%).     Prior Study Comparison     Compared to the prior study from 7/2022, the current study reveals no   significant change.       1/6/2023 Electrocardiogram  NSR, no ischemic changes    Ambulatory ECG monitor from 11/29/2022 to 12/11/2022:      10/5/2022 Echo   Interpretation Summary  Global and regional left ventricular function is hyperkinetic with an EF of  65-70%.  Right ventricular function, chamber size, wall motion, and thickness are  normal.  Mild to moderate aortic insufficiency is present.  Pulmonary artery systolic pressure is normal.  The inferior vena cava is normal.  No significant changes noted.    [facility]20[facility]  Summary     1. The left ventricular size is normal.  Systolic function is normal.     The   estimated ejection fraction is 65%.  Wall thickness is normal.  Left   ventricular segmental wall motion is normal.     2. The right ventricular size is normal.  Systolic function is normal.     3. There is mild aortic valve stenosis with a peak velocity of 269.00   cm/s,   mean gradient of 18 mmHg, and aortic valve area of 1.54 cm2.     4. There is mild aortic valve regurgitation.     5. Compared to prior no significant change in AS.         Assessment and Plan:   Ms. Garcia is a 72 year old female with medical history pertinent for coronary disease (status post CABG in September 2022), hypertension, and former smoking who presents to cardiology clinic for routine follow up.     Appearing well.  Discussed NM stress test which is negative for ischemia with preserved EF. Do not suspect exertional dyspnea is cardiac in origin. May be some component of deconditioning. Previous PFTs did show emphysema. Recommend following up with her PCP for evaluation of dyspnea as well as GI issues. Awaiting Zio results. Repatha is cost prohibitive. Continue rosuvastatin 5 mg M/W/F.       # CAD s/p CABG (9/2022)  # HLD, goal LDL < 70   - Continue ASA   - Continue BB  - Continue rosuvastatin 5 mg M/W/F for now  - Prescribe Repatha, will start pending results of FLP-> FLP 5/11 with LDL 91    # HTN, controlled  - continue current antihypertensives     # Atrial fibrillation, post-operative s/p DCCV and brief AAD therapy with amiodarone   - ECG 1/2023: NSR  - CHADSVASC=+CAD, +age, +gender, +HTN  4, previously on eliquis, stopped in Jan '23 per EP  - Continue metoprolol  - 7 day zio in process           Follow up:  - Cardiology annually, sooner if needed      Radha Hoff, BARBIE, NP-C  General Cardiology   5/12/2023

## 2023-05-12 NOTE — PATIENT INSTRUCTIONS
You were seen today in the Cardiovascular Clinic at the [facility] by:       THUY Roberson     Your visit summary and instructions are as follows:    Continue current medications. I will discuss statin plan with Dr. Doshi.   Himanshu monitor pending. Will follow up with results.   Continue to work toward the recommendation of 150 minutes of moderate intensity exercise/week and maintain a healthy lifestyle (avoid illicit drugs, smoking and moderate alcohol consumption)      Return to cardiology clinic annually, sooner if needed     Thank you for your visit today!     Please MyChart message me or call my nurse if you have any questions or concerns.      During Business Hours:  [phone], option # 1 (University) then option # 4 (medical questions) and ask to speak with my nurse.     After hours, weekends or holidays:   [phone], Option #4  Ask to speak to the On-Call Cardiologist. Inform them you are a cardiology patient at the [facility].

## 2023-10-04 ENCOUNTER — TELEPHONE (OUTPATIENT)
Dept: CARDIOLOGY | Facility: CLINIC | Age: 73
End: 2023-10-04
Payer: COMMERCIAL

## 2023-10-04 NOTE — TELEPHONE ENCOUNTER
[facility] Call Center    Phone Message    May a detailed message be left on voicemail: yes     Reason for Call: Per pt saw pcp and was advised to contact cardiologist due to fluid in lungs, please reach out to further discuss.    Action Taken: Other: Cardiology    Travel Screening: Not Applicable    Thank you!  [facility]

## 2023-10-05 NOTE — TELEPHONE ENCOUNTER
"Spoke with pt regarding her recent symptoms. Pt had covid 3 weeks ago. Since then she has had a productive cough, low energy, SOB. Her PCP has her getting a chest CT done today due to \"fluid on the lungs\" and increased hr lasix to 40mg x3 days with abx. She feels improved today. She will keep us updated after the results and plan with her PCP. At that time, we will make a plan with cardiology.       "

## 2023-10-21 ENCOUNTER — HEALTH MAINTENANCE LETTER (OUTPATIENT)
Age: 73
End: 2023-10-21

## 2023-12-30 ENCOUNTER — HEALTH MAINTENANCE LETTER (OUTPATIENT)
Age: 73
End: 2023-12-30

## 2024-02-19 ENCOUNTER — TELEPHONE (OUTPATIENT)
Dept: CARDIOLOGY | Facility: CLINIC | Age: 74
End: 2024-02-19
Payer: COMMERCIAL

## 2024-02-19 NOTE — TELEPHONE ENCOUNTER
Left Voicemail (1st Attempt) for the patient to call back and schedule the following:    Appointment type: RETURN EP   Provider: CYNTHIA   Return date: 04/18/24  Specialty phone number: [phone] OPT 1   Additional appointment(s) needed: N/A   Additonal Notes: N/A

## 2024-02-21 ENCOUNTER — TELEPHONE (OUTPATIENT)
Dept: CARDIOLOGY | Facility: CLINIC | Age: 74
End: 2024-02-21
Payer: COMMERCIAL

## 2024-04-04 ENCOUNTER — OFFICE VISIT (OUTPATIENT)
Dept: CARDIOLOGY | Facility: CLINIC | Age: 74
End: 2024-04-04
Attending: NURSE PRACTITIONER
Payer: COMMERCIAL

## 2024-04-04 VITALS
SYSTOLIC BLOOD PRESSURE: 139 MMHG | OXYGEN SATURATION: 97 % | WEIGHT: 193.5 LBS | BODY MASS INDEX: 30.76 KG/M2 | DIASTOLIC BLOOD PRESSURE: 73 MMHG | HEART RATE: 71 BPM

## 2024-04-04 DIAGNOSIS — R11.0 NAUSEA: ICD-10-CM

## 2024-04-04 DIAGNOSIS — I48.0 PAF (PAROXYSMAL ATRIAL FIBRILLATION) (H): ICD-10-CM

## 2024-04-04 DIAGNOSIS — I35.1 NONRHEUMATIC AORTIC VALVE INSUFFICIENCY: Primary | ICD-10-CM

## 2024-04-04 PROCEDURE — 93005 ELECTROCARDIOGRAM TRACING: CPT

## 2024-04-04 PROCEDURE — 93010 ELECTROCARDIOGRAM REPORT: CPT | Performed by: INTERNAL MEDICINE

## 2024-04-04 PROCEDURE — 99214 OFFICE O/P EST MOD 30 MIN: CPT | Performed by: NURSE PRACTITIONER

## 2024-04-04 PROCEDURE — G0463 HOSPITAL OUTPT CLINIC VISIT: HCPCS | Performed by: NURSE PRACTITIONER

## 2024-04-04 RX ORDER — FUROSEMIDE 20 MG
20 TABLET ORAL DAILY PRN
Qty: 90 TABLET | Refills: 2 | Status: SHIPPED | OUTPATIENT
Start: 2024-04-04

## 2024-04-04 RX ORDER — FUROSEMIDE 20 MG
20 TABLET ORAL DAILY PRN
Qty: 90 TABLET | Refills: 2 | Status: SHIPPED | OUTPATIENT
Start: 2024-04-04 | End: 2024-04-04

## 2024-04-04 RX ORDER — ONDANSETRON 4 MG/1
4 TABLET, FILM COATED ORAL EVERY 8 HOURS PRN
Qty: 30 TABLET | Refills: 0 | Status: SHIPPED | OUTPATIENT
Start: 2024-04-04

## 2024-04-04 ASSESSMENT — PAIN SCALES - GENERAL: PAINLEVEL: NO PAIN (0)

## 2024-04-04 NOTE — PROGRESS NOTES
"  [facility]   Cardiology Clinic Note      HPI:   Ms. Garcia is a 72 year old female with medical history pertinent for coronary disease (status post CABG in September 2022), hypertension, post-op AF, and former smoking who presents to cardiology clinic for routine follow up.     Briefly, Ms. Garcia had been experiencing ~10 months of jaw/neck pain and dyspnea on exertion until she had a pharmacologic SPECT at [facility] in July 2022.  While the stress/perfusion part was unremarkable, she exhibited 1 mm downsloping ST depressions in the ECG portion (territory unclear if [facility] records).  She subsequently underwent invasive coronary angiography at [facility], which yielded a 70% proximal LAD disease, with an 80% first diagonal lesion, 80% mid circumflex lesion, 70% OM1 lesion, and a 80% mid RCA lesion.  Ms. Garcia was then referred to cardiac surgery at the [facility] for coronary bypass grafting, which she successfully underwent in September 2022, as below.     Ms. Garcia's postoperative course was complicated by a left-sided pleural effusion that required thoracentesis and postoperative atrial fibrillation.  Ms. Garcia's atrial fibrillation manifested as palpitations, dyspnea, and presyncope.  An ECG in the cardiac surgery clinic demonstrated atrial fibrillation and she was subsequently sent to the ER.  In the emergency room, she underwent DCCV after she was started on amiodarone infusion.  Ms. Garcia was transitioned to oral amiodarone regimen along with apixaban and metoprolol succinate and has since followed up with Dr. López.    Most recently, in January 2023 Ms. Garcia established general cardiology care with Dr. Doshi. At that time she stated that she was \"nearly back to normal\".  She was interested in resuming downhill skiing this winter.  She has had issues with myalgias in the past with pravastatin, atorvastatin, and rosuvastatin.  Her primary care team had " "been administering a low-dose of rosuvastatin to see if she will be able to tolerate it.  Ms. Garcia had an unpleasant experience in cardiac rehab and decided to instead ramp up her physical activity at home. At her visit with Dr. Doshi they discussed that if she was unable to tolerate rosuvastatin and or it did not decrease her LDL to less than 70, it was advisable to initiate a PCSK9 inhibitor.     At our visit in April 2023, Ms. Garcia was quite concerned about worsening symptoms that included fatigue, dyspnea on exertion, palpitations, and neck and jaw pain. First noticed symptoms in February. Reported breathlessness after walking up a few stairs and that with exertion she was feeling a discomfort in her neck that \"feels like a sore throat\" and then jaw begins hurting. She had this type of neck/jaw pain prior to her CABG, but at a more severe degree. Initial evaluation completed by her PCP and included a CXR and EKG which were normal. Ms. Garcia increased her lasix to 40 mg and felt a dramatic improvement in her breathing.  Denied any other HF symptoms. Given these symptoms, elected to proceed with NM stress test and ambulatory monitor. NM stress test was completed through [employer] and was negative for ischemia. EF 74%. She also completed ABIs which were also normal. Reported stomach upset with rosuvastatin which she was only taking 5 mg M/W/F. Repatha at that time was cost prohibitive.      Follow up May 2023, reported dyspnea on exertion, but feeling less tired and no reports of palpitations. Also reported stomach upset, worse on days she takes rosuvastatin. Discussed starting Repatha with insurance. Limited coverage. Price per dose $400. She does not qualify for patient assistance. Previous PFTs with evidence of emphysema. Instructed to follow up with PCP for further evaluation of her LOPEZ.     Today, Ms. Garcia reports feeling well. She finally feels back to her norm. Still has some fatigue and " typically needs to take an afternoon nap. No CP or palpitations. Will still get some LOPEZ, but no worse. She established with pulmonary and is treated for asthma. Denies acute SOB. No dizzy or lightheaded episodes. No syncope or presyncope. No orthopnea, PND, LE edema. She started on ozempic and has lost 10 lbs. Feels that this also has helped with her breathing. Started on repatha in January.     Today's ECG shows SR at 73 bpm,  ms, QRS 78 ms, QTc 409 ms.     Cardiac Medications   - ASA 81 mg daily   - Lasix 20 mg //   - Lisinopril 20 mg daily   - Metoprolol succinate 25 mg daily   - Repatha   - Diltiazem 120 mg daily       PAST MEDICAL HISTORY:  Past Medical History:   Diagnosis Date    Anxiety     Centrilobular emphysema (H)     Childhood asthma     Coronary artery disease involving native coronary artery of native heart     Esophageal reflux     Essential hypertension     Fatty liver disease     Hair loss     Migraine     Mild aortic stenosis     Mixed hyperlipidemia     MRSA infection     Skin abscesses after COVID vaccinations    Osteoarthritis     Primary insomnia        FAMILY HISTORY:  Family History   Problem Relation Age of Onset    Lung Cancer Mother     LUNG DISEASE Father     Hypothyroidism Sister     Hypothyroidism Sister     Osteosarcoma Sister     Anesthesia Reaction No family hx of        SOCIAL HISTORY:  Social History     Socioeconomic History    Marital status:    Tobacco Use    Smoking status: Former     Packs/day: 1.00     Years: 25.00     Pack years: 25.00     Types: Cigarettes     Quit date: 1992     Years since quittin.2    Smokeless tobacco: Never   Substance and Sexual Activity    Alcohol use: Yes     Comment: socially       CURRENT MEDICATIONS:  Current Outpatient Medications   Medication Sig Dispense Refill    acyclovir (ZOVIRAX) 400 MG tablet Take 400 mg by mouth daily as needed      albuterol (PROAIR HFA/PROVENTIL HFA/VENTOLIN HFA) 108 (90 Base) MCG/ACT inhaler  Inhale 2 puffs into the lungs every 6 hours      amLODIPine (NORVASC) 2.5 MG tablet Take 1 tablet by mouth daily at 2 pm      aspirin (ASA) 81 MG chewable tablet Take 1 tablet (81 mg) by mouth daily 60 tablet 3    Coenzyme Q10 (CO Q 10) 10 MG CAPS Take 1 capsule by mouth daily      diltiazem ER (TIAZAC) 120 MG 24 hr ER beaded capsule Take 120 mg by mouth daily      Ergocalciferol 50 MCG (2000 UT) TABS 50 mcg every morning      evolocumab (REPATHA) 140 MG/ML prefilled autoinjector Inject 1 mL (140 mg) Subcutaneous every 14 days 1 mL 11    fish oil-omega-3 fatty acids 1000 MG capsule Take 2 g by mouth every morning      furosemide (LASIX) 20 MG tablet Take lasix 20 mg on M/W/F/Sun and lasix 40 mg on T/Th/Sat 90 tablet 2    gabapentin (NEURONTIN) 100 MG capsule 200 mg At Bedtime      lisinopril (ZESTRIL) 20 MG tablet Take 20 mg by mouth daily      magnesium 100 MG CAPS 200 mg At Bedtime      methocarbamol (ROBAXIN) 500 MG tablet Take 2 tablets (1,000 mg) by mouth every 6 hours as needed for muscle spasms or other (pain) (Patient not taking: Reported on 1/6/2023) 100 tablet 0    metoprolol succinate ER (TOPROL XL) 25 MG 24 hr tablet Take 25 mg by mouth every morning      mupirocin (BACTROBAN) 2 % external ointment as needed      nitroGLYcerin (NITROSTAT) 0.4 MG sublingual tablet 0.4 mg every 5 minutes as needed      nystatin (MYCOSTATIN) 602179 UNIT/ML suspension Swish and swallow or swish and spit 1 TEASPOONFUL 4 times per day for 10 days      omeprazole (PRILOSEC) 20 MG DR capsule 20 mg At Bedtime      PARoxetine (PAXIL) 20 MG tablet Take 20 mg by mouth every morning      rosuvastatin (CRESTOR) 5 MG tablet Take 1 pill Monday and 1 pill Friday of each week      sodium fluoride dental gel (PREVIDENT) 1.1 % GEL topical gel Brush 2x/day.  Do not eat or drink for 30 minutes after.      sulfamethoxazole-trimethoprim (BACTRIM) 400-80 MG tablet Take 1 tablet by mouth 2 times daily (Patient not taking: Reported on 9/30/2022)  14 tablet 0    zolpidem (AMBIEN) 5 MG tablet Take 0.5 tablets (2.5 mg) by mouth nightly as needed for sleep 10 tablet 0     No current facility-administered medications for this visit.       ROS:   Refer to HPI    EXAM:  /73 (BP Location: Right arm, Patient Position: Supine, Cuff Size: Adult Regular)   Pulse 71   Wt 87.8 kg (193 lb 8 oz)   SpO2 97%   BMI 30.76 kg/m     GENERAL: Appears comfortable, in no acute distress.   HEENT: Eye symmetrical, no discharge or icterus bilaterally. Mucous membranes moist and without lesions.  CV: RRR, +S1S2, no murmur, rub, or gallop. JVP near clavicle  RESPIRATORY: Respirations regular, even, and unlabored. Lungs CTA throughout.   GI: Soft and non distended with normoactive bowel sounds present in all quadrants. No tenderness, rebound, guarding. No hepatomegaly.   EXTREMITIES: No peripheral edema. 2+ bilateral pedal pulses.   NEUROLOGIC: Alert and oriented x 3. No focal deficits.   MUSCULOSKELETAL: No joint swelling or tenderness.   SKIN: No jaundice. No rashes or lesions.     Labs, reviewed with patient in clinic today:  CBC RESULTS:  Lab Results   Component Value Date    WBC 9.5 10/17/2022    RBC 4.47 10/17/2022    HGB 12.4 10/17/2022    HCT 39.4 10/17/2022    MCV 88 10/17/2022    MCH 27.7 10/17/2022    MCHC 31.5 10/17/2022    RDW 13.9 10/17/2022     (H) 10/17/2022       CMP RESULTS:  Lab Results   Component Value Date     (L) 10/17/2022    POTASSIUM 4.6 10/17/2022    POTASSIUM 4.1 09/14/2022    CHLORIDE 101 05/11/2023    CO2 25 10/17/2022    ANIONGAP 11 10/17/2022     (H) 10/17/2022     (H) 09/16/2022    BUN 22.6 10/17/2022    CR 0.70 10/17/2022    GFRESTIMATED >90 10/17/2022    ALYCE 9.5 10/17/2022    BILITOTAL 0.3 09/21/2022    ALBUMIN 3.2 (L) 09/21/2022    ALKPHOS 60 09/21/2022    ALT 30 09/21/2022    AST 30 09/21/2022        INR RESULTS:  Lab Results   Component Value Date    INR 1.04 09/20/2022       Lab Results   Component Value Date     "MAG 2.2 09/20/2022     No results found for: \"NTBNPI\"  Lab Results   Component Value Date    NTBNP 401 10/17/2022       Cardiac Diagnostics:    5/11/2023 NM Lexiscan ([facility])  Summary     1. A regadenoson infusion pharmacologic stress test was performed.     2. Functional capacity was not assessed.     3. Shortness of breath and jaw/throat discomfort with infusion.     4. Negative stress ECG for ST segment depression.     5. Myocardial perfusion imaging is normal.     6. Normal left ventricular systolic function. Calculated LVEF 74 %.     7. Based on this study, the annual cardiovascular mortality rate is low   (less than 1%).     Prior Study Comparison     Compared to the prior study from 7/2022, the current study reveals no   significant change.       1/6/2023 Electrocardiogram  NSR, no ischemic changes    Ambulatory ECG monitor from 11/29/2022 to 12/11/2022:      10/5/2022 Echo   Interpretation Summary  Global and regional left ventricular function is hyperkinetic with an EF of  65-70%.  Right ventricular function, chamber size, wall motion, and thickness are  normal.  Mild to moderate aortic insufficiency is present.  Pulmonary artery systolic pressure is normal.  The inferior vena cava is normal.  No significant changes noted.    8/24/2022 Echo ( [facility])  Summary     1. The left ventricular size is normal.  Systolic function is normal.     The   estimated ejection fraction is 65%.  Wall thickness is normal.  Left   ventricular segmental wall motion is normal.     2. The right ventricular size is normal.  Systolic function is normal.     3. There is mild aortic valve stenosis with a peak velocity of 269.00   cm/s,   mean gradient of 18 mmHg, and aortic valve area of 1.54 cm2.     4. There is mild aortic valve regurgitation.     5. Compared to prior no significant change in AS.         Assessment and Plan:   Ms. Garcia is a 73 year old female with medical history pertinent for coronary disease " (status post CABG in September 2022), hypertension, and former smoking who presents to cardiology clinic for routine follow up.     Appearing and feeling well. No CV concerns. BP well controlled. Now on Repatha. We will switch lasix to daily PRN. No other medication changes.     # CAD s/p CABG (9/2022)  # HLD, goal LDL < 70   - Continue ASA   - Continue BB  - Continue Repatha [id number]    # HTN, controlled  - continue current antihypertensives     # Atrial fibrillation, post-operative s/p DCCV and brief AAD therapy with amiodarone   - ECG 1/2023: NSR  - CHADSVASC=+CAD, +age, +gender, +HTN  4, previously on eliquis, stopped in Jan '23 per EP  - Continue metoprolol      Follow up:  - Cardiology annually, sooner if needed    Radha Hoff, BARBIE, NP-C  General Cardiology   4/4/2024

## 2024-04-04 NOTE — LETTER
4/4/2024      RE: Luis Alberto Garcia  [address] [place] [address]       Dear Colleague,    Thank you for the opportunity to participate in the care of your patient, Luis Alberto Garcia, at the [facility] [place] [facility]. Please see a copy of my visit note below.      [facility] - [facility]   Cardiology Clinic Note      HPI:   Ms. Garcia is a 72 year old female with medical history pertinent for coronary disease (status post CABG in September 2022), hypertension, post-op AF, and former smoking who presents to cardiology clinic for routine follow up.     Briefly, Ms. Garcia had been experiencing ~10 months of jaw/neck pain and dyspnea on exertion until she had a pharmacologic SPECT at [facility] in July 2022.  While the stress/perfusion part was unremarkable, she exhibited 1 mm downsloping ST depressions in the ECG portion (territory unclear if Care Everywhere records).  She subsequently underwent invasive coronary angiography at [facility], which yielded a 70% proximal LAD disease, with an 80% first diagonal lesion, 80% mid circumflex lesion, 70% OM1 lesion, and a 80% mid RCA lesion.  Ms. Garcia was then referred to cardiac surgery at the [facility] for coronary bypass grafting, which she successfully underwent in September 2022, as below.     Ms. Garcia's postoperative course was complicated by a left-sided pleural effusion that required thoracentesis and postoperative atrial fibrillation.  Ms. Garcia's atrial fibrillation manifested as palpitations, dyspnea, and presyncope.  An ECG in the cardiac surgery clinic demonstrated atrial fibrillation and she was subsequently sent to the ER.  In the emergency room, she underwent DCCV after she was started on amiodarone infusion.  Ms. Garcia was transitioned to oral amiodarone regimen along with apixaban and metoprolol succinate and has since followed up with   "Rene.    Most recently, in January 2023 Ms. Garcia established general cardiology care with Dr. Doshi. At that time she stated that she was \"nearly back to normal\".  She was interested in resuming downhill skiing this winter.  She has had issues with myalgias in the past with pravastatin, atorvastatin, and rosuvastatin.  Her primary care team had been administering a low-dose of rosuvastatin to see if she will be able to tolerate it.  Ms. Garcia had an unpleasant experience in cardiac rehab and decided to instead ramp up her physical activity at home. At her visit with Dr. Doshi they discussed that if she was unable to tolerate rosuvastatin and or it did not decrease her LDL to less than 70, it was advisable to initiate a PCSK9 inhibitor.     At our visit in April 2023, Ms. Garcia was quite concerned about worsening symptoms that included fatigue, dyspnea on exertion, palpitations, and neck and jaw pain. First noticed symptoms in February. Reported breathlessness after walking up a few stairs and that with exertion she was feeling a discomfort in her neck that \"feels like a sore throat\" and then jaw begins hurting. She had this type of neck/jaw pain prior to her CABG, but at a more severe degree. Initial evaluation completed by her PCP and included a CXR and EKG which were normal. Ms. Garcia increased her lasix to 40 mg and felt a dramatic improvement in her breathing.  Denied any other HF symptoms. Given these symptoms, elected to proceed with NM stress test and ambulatory monitor. NM [facility] and was negative for ischemia. EF 74%. She also completed ABIs which were also normal. Reported stomach upset with rosuvastatin which she was only taking 5 mg M/W/F. Repatha at that time was cost prohibitive.      Follow up May 2023, reported dyspnea on exertion, but feeling less tired and no reports of palpitations. Also reported stomach upset, worse on days she takes rosuvastatin. " Discussed starting Repatha with insurance. Limited coverage. Price per dose $400. She does not qualify for patient assistance. Previous PFTs with evidence of emphysema. Instructed to follow up with PCP for further evaluation of her LOPEZ.     Today, Ms. Garcia reports feeling well. She finally feels back to her norm. Still has some fatigue and typically needs to take an afternoon nap. No CP or palpitations. Will still get some LOPEZ, but no worse. She established with pulmonary and is treated for asthma. Denies acute SOB. No dizzy or lightheaded episodes. No syncope or presyncope. No orthopnea, PND, LE edema. She started on ozempic and has lost 10 lbs. Feels that this also has helped with her breathing. Started on repatha in January.     Today's ECG shows SR at 73 bpm,  ms, QRS 78 ms, QTc 409 ms.     Cardiac Medications   - ASA 81 mg daily   - Lasix 20 mg M/W/F   - Lisinopril 20 mg daily   - Metoprolol succinate 25 mg daily   - Repatha   - Diltiazem 120 mg daily       PAST MEDICAL HISTORY:  Past Medical History:   Diagnosis Date     Anxiety      Centrilobular emphysema (H)      Childhood asthma      Coronary artery disease involving native coronary artery of native heart      Esophageal reflux      Essential hypertension      Fatty liver disease      Hair loss      Migraine      Mild aortic stenosis      Mixed hyperlipidemia      MRSA infection     Skin abscesses after COVID vaccinations     Osteoarthritis      Primary insomnia        FAMILY HISTORY:  Family History   Problem Relation Age of Onset     Lung Cancer Mother      LUNG DISEASE Father      Hypothyroidism Sister      Hypothyroidism Sister      Osteosarcoma Sister      Anesthesia Reaction No family hx of        SOCIAL HISTORY:  Social History     Socioeconomic History     Marital status:    Tobacco Use     Smoking status: Former     Packs/day: 1.00     Years: 25.00     Pack years: 25.00     Types: Cigarettes     Quit date: 2/2/1992     Years since  quittin.2     Smokeless tobacco: Never   Substance and Sexual Activity     Alcohol use: Yes     Comment: socially       CURRENT MEDICATIONS:  Current Outpatient Medications   Medication Sig Dispense Refill     acyclovir (ZOVIRAX) 400 MG tablet Take 400 mg by mouth daily as needed       albuterol (PROAIR HFA/PROVENTIL HFA/VENTOLIN HFA) 108 (90 Base) MCG/ACT inhaler Inhale 2 puffs into the lungs every 6 hours       amLODIPine (NORVASC) 2.5 MG tablet Take 1 tablet by mouth daily at 2 pm       aspirin (ASA) 81 MG chewable tablet Take 1 tablet (81 mg) by mouth daily 60 tablet 3     Coenzyme Q10 (CO Q 10) 10 MG CAPS Take 1 capsule by mouth daily       diltiazem ER (TIAZAC) 120 MG 24 hr ER beaded capsule Take 120 mg by mouth daily       Ergocalciferol 50 MCG (2000 UT) TABS 50 mcg every morning       evolocumab (REPATHA) 140 MG/ML prefilled autoinjector Inject 1 mL (140 mg) Subcutaneous every 14 days 1 mL 11     fish oil-omega-3 fatty acids 1000 MG capsule Take 2 g by mouth every morning       furosemide (LASIX) 20 MG tablet Take lasix 20 mg on ///Sun and lasix 40 mg on //Sat 90 tablet 2     gabapentin (NEURONTIN) 100 MG capsule 200 mg At Bedtime       lisinopril (ZESTRIL) 20 MG tablet Take 20 mg by mouth daily       magnesium 100 MG CAPS 200 mg At Bedtime       methocarbamol (ROBAXIN) 500 MG tablet Take 2 tablets (1,000 mg) by mouth every 6 hours as needed for muscle spasms or other (pain) (Patient not taking: Reported on 2023) 100 tablet 0     metoprolol succinate ER (TOPROL XL) 25 MG 24 hr tablet Take 25 mg by mouth every morning       mupirocin (BACTROBAN) 2 % external ointment as needed       nitroGLYcerin (NITROSTAT) 0.4 MG sublingual tablet 0.4 mg every 5 minutes as needed       nystatin (MYCOSTATIN) 260286 UNIT/ML suspension Swish and swallow or swish and spit 1 TEASPOONFUL 4 times per day for 10 days       omeprazole (PRILOSEC) 20 MG DR capsule 20 mg At Bedtime       PARoxetine (PAXIL) 20 MG tablet  Take 20 mg by mouth every morning       rosuvastatin (CRESTOR) 5 MG tablet Take 1 pill Monday and 1 pill Friday of each week       sodium fluoride dental gel (PREVIDENT) 1.1 % GEL topical gel Brush 2x/day.  Do not eat or drink for 30 minutes after.       sulfamethoxazole-trimethoprim (BACTRIM) 400-80 MG tablet Take 1 tablet by mouth 2 times daily (Patient not taking: Reported on 9/30/2022) 14 tablet 0     zolpidem (AMBIEN) 5 MG tablet Take 0.5 tablets (2.5 mg) by mouth nightly as needed for sleep 10 tablet 0     No current facility-administered medications for this visit.       ROS:   Refer to HPI    EXAM:  /73 (BP Location: Right arm, Patient Position: Supine, Cuff Size: Adult Regular)   Pulse 71   Wt 87.8 kg (193 lb 8 oz)   SpO2 97%   BMI 30.76 kg/m     GENERAL: Appears comfortable, in no acute distress.   HEENT: Eye symmetrical, no discharge or icterus bilaterally. Mucous membranes moist and without lesions.  CV: RRR, +S1S2, no murmur, rub, or gallop. JVP near clavicle  RESPIRATORY: Respirations regular, even, and unlabored. Lungs CTA throughout.   GI: Soft and non distended with normoactive bowel sounds present in all quadrants. No tenderness, rebound, guarding. No hepatomegaly.   EXTREMITIES: No peripheral edema. 2+ bilateral pedal pulses.   NEUROLOGIC: Alert and oriented x 3. No focal deficits.   MUSCULOSKELETAL: No joint swelling or tenderness.   SKIN: No jaundice. No rashes or lesions.     Labs, reviewed with patient in clinic today:  CBC RESULTS:  Lab Results   Component Value Date    WBC 9.5 10/17/2022    RBC 4.47 10/17/2022    HGB 12.4 10/17/2022    HCT 39.4 10/17/2022    MCV 88 10/17/2022    MCH 27.7 10/17/2022    MCHC 31.5 10/17/2022    RDW 13.9 10/17/2022     (H) 10/17/2022       CMP RESULTS:  Lab Results   Component Value Date     (L) 10/17/2022    POTASSIUM 4.6 10/17/2022    POTASSIUM 4.1 09/14/2022    CHLORIDE 101 05/11/2023    CO2 25 10/17/2022    ANIONGAP 11 10/17/2022     " (H) 10/17/2022     (H) 09/16/2022    BUN 22.6 10/17/2022    CR 0.70 10/17/2022    GFRESTIMATED >90 10/17/2022    ALYCE 9.5 10/17/2022    BILITOTAL 0.3 09/21/2022    ALBUMIN 3.2 (L) 09/21/2022    ALKPHOS 60 09/21/2022    ALT 30 09/21/2022    AST 30 09/21/2022        INR RESULTS:  Lab Results   Component Value Date    INR 1.04 09/20/2022       Lab Results   Component Value Date    MAG 2.2 09/20/2022     No results found for: \"NTBNPI\"  Lab Results   Component Value Date    NTBNP 401 10/17/2022       Cardiac Diagnostics:    5/11/2023 NM Lexiscan (CareEverywhere)  Summary     1. A regadenoson infusion pharmacologic stress test was performed.     2. Functional capacity was not assessed.     3. Shortness of breath and jaw/throat discomfort with infusion.     4. Negative stress ECG for ST segment depression.     5. Myocardial perfusion imaging is normal.     6. Normal left ventricular systolic function. Calculated LVEF 74 %.     7. Based on this study, the annual cardiovascular mortality rate is low   (less than 1%).     Prior Study Comparison     Compared to the prior study from 7/2022, the current study reveals no   significant change.       1/6/2023 Electrocardiogram  NSR, no ischemic changes    Ambulatory ECG monitor from 11/29/2022 to 12/11/2022:      10/5/2022 Echo   Interpretation Summary  Global and regional left ventricular function is hyperkinetic with an EF of  65-70%.  Right ventricular function, chamber size, wall motion, and thickness are  normal.  Mild to moderate aortic insufficiency is present.  Pulmonary artery systolic pressure is normal.  The inferior vena cava is normal.  No significant changes noted.    8/24/2022 Echo ( Care Everywhere)  Summary     1. The left ventricular size is normal.  Systolic function is normal.     The   estimated ejection fraction is 65%.  Wall thickness is normal.  Left   ventricular segmental wall motion is normal.     2. The right ventricular size is normal.  " Systolic function is normal.     3. There is mild aortic valve stenosis with a peak velocity of 269.00   cm/s,   mean gradient of 18 mmHg, and aortic valve area of 1.54 cm2.     4. There is mild aortic valve regurgitation.     5. Compared to prior no significant change in AS.         Assessment and Plan:   Ms. Garcia is a 73 year old female with medical history pertinent for coronary disease (status post CABG in September 2022), hypertension, and former smoking who presents to cardiology clinic for routine follow up.     Appearing and feeling well. No CV concerns. BP well controlled. Now on Repatha. We will switch lasix to daily PRN. No other medication changes.     # CAD s/p CABG (9/2022)  # HLD, goal LDL < 70   - Continue ASA   - Continue BB  - Continue Repatha [id number]    # HTN, controlled  - continue current antihypertensives     # Atrial fibrillation, post-operative s/p DCCV and brief AAD therapy with amiodarone   - ECG 1/2023: NSR  - CHADSVASC=+CAD, +age, +gender, +HTN  4, previously on eliquis, stopped in Jan '23 per EP  - Continue metoprolol      Follow up:  - Cardiology annually, sooner if needed    Radha Hoff DNP, NP-C  General Cardiology   4/4/2024      Please do not hesitate to contact me if you have any questions/concerns.     Sincerely,     MARCOS Guzmán CNP

## 2024-04-04 NOTE — NURSING NOTE
Chief Complaint   Patient presents with    Follow Up     April 4, 2024 - Radha Hoff NP: Return for annual cardiology follow-up       Vitals were taken, medications reconciled, and EKG was performed.    Manny Villavicencio, SHRUTI  2:50 PM

## 2024-04-04 NOTE — PATIENT INSTRUCTIONS
You were seen in the Cardiology Clinic today by: Radha Hoff NP    Plan:   Medication Changes:   Take Lasix 20 mg daily as needed for weight gain, edema or shortness of breath.     Follow up Visit:  With Radha Hoff NP in 1 year or sooner if needed.     If you have further questions, please utilize [employer] to contact us.     Your Care Team:    Cardiology   Telephone Number     Nurse Line  Robe Auguste RN    [phone]     For scheduling appointments:  [phone]           On-call cardiologist for after hours or on weekends:   [phone], option #4, and ask to speak to the on-call cardiologist.     Cardiovascular Clinic:   [address] [place] [address]. [place] [address]      As always, Thank you for trusting us with your health care needs!

## 2024-04-05 NOTE — PROGRESS NOTES
[address]  [address]                            OPERATIVE REPORT      PATIENT NAME: ROMAN PERALTA              : 1963  MED REC NO: [id number]                       ROOM: OR  ACCOUNT NO: [id number]                       ADMIT DATE: 2024  PROVIDER: Gerber Miller MD    DATE OF SERVICE:  2024    PREOPERATIVE DIAGNOSES:  Incarcerated ventral (supraumbilical) hernia.    POSTOPERATIVE DIAGNOSES:  Same    PROCEDURES PERFORMED:  Laparoscopic-robotic assisted repair of incarcerated ventral hernia with mesh.    SURGEON:  Gerber Miller MD    ASSISTANT:  None.    ANESTHESIA:  General.    ESTIMATED BLOOD LOSS:  Minimal.    SPECIMENS REMOVED:  None.    INTRAOPERATIVE FINDINGS:  See op note below.     COMPLICATIONS:  None.    IMPLANTS:  Mesh/implant inserted, Phasix 7.6 cm, [facility], preperitoneal.  Additional implants none.    INDICATIONS:  The patient is a 60-year-old white male, who has been seen by me several times in the office for several complaints.  One, a hiatal hernia associated gastroesophageal reflux disease, which is currently being managed adequately with medical management.  He has a small asymptomatic umbilical hernia, which he did not wish to proceed with surgery for as it has been unchanged and asymptomatic.  Lastly, he is being seen for a primary hernia approximately group home between the umbilicus and the xiphoid, increasing in size and becoming increasingly uncomfortable and symptomatic.  It is this hernia that he wished to address surgically.  By exam, it contained chronically incarcerated likely preperitoneal fat, less likely omentum.  I discussed with him the nature of surgery, alternatives, benefits, and risks.  He gave consent to proceed.    DESCRIPTION OF PROCEDURE:  He was taken to the OR on 2024, met and identified in the preoperative holding area.  The hernia site was marked.  He was then brought  HPI  Luis Alberto Garcia is a 72 year old female who is seeking a second opinion for CABG,  History is obtained from the patient and chart review     Patient who had been experiencing jaw/neck pain, chest pressure, and fatiguefor the past 10 months. She had a lexiscan completed showing normal perfusion imaging but abnormal ECG. Previous CT scan done showing moderate coronary calcifications. She was referred for coronary angiogram on 8/24/22 which revealed severe multivessel coronary disease.      Her history is otherwise significant for hypertension, mild aortic stenosis, smoking, childhood asthma, emphysema, GERD, fatty liver disease, migraines, OA with bilateral knee replacements, anxiety and insomnia.                Past Medical History       Past Medical History:   Diagnosis Date     Anxiety       Centrilobular emphysema (H)       Childhood asthma       Coronary artery disease involving native coronary artery of native heart       Esophageal reflux       Essential hypertension       Fatty liver disease       Hair loss       Migraine       Mild aortic stenosis       Mixed hyperlipidemia       MRSA infection       Skin abscesses after COVID vaccinations     Osteoarthritis       Primary insomnia              Past Surgical History  Past Surgical History         Past Surgical History:   Procedure Laterality Date     CATARACT IOL, RT/LT         IR LUMBAR EPIDURAL STEROID INJECTION   10/06/2005     IR LUMBAR EPIDURAL STEROID INJECTION   11/09/2005     Left fibular fracture ORIF   2013     REPLACEMENT TOTAL KNEE Left 2020     REPLACEMENT TOTAL KNEE Right 11/2021     ROTATOR CUFF REPAIR RT/LT Right 2017            Prior to Admission Medications  Active Medications          Current Outpatient Medications   Medication Sig Dispense Refill     acyclovir (ZOVIRAX) 400 MG tablet Take 400 mg by mouth daily as needed         albuterol (PROAIR HFA/PROVENTIL HFA/VENTOLIN HFA) 108 (90 Base) MCG/ACT inhaler Inhale 2 puffs into the lungs  every 6 hours         amLODIPine (NORVASC) 5 MG tablet Take 5 mg by mouth every morning         aspirin (ASA) 81 MG chewable tablet Take 81 mg by mouth every morning         celecoxib (CELEBREX) 200 MG capsule Take 200 mg by mouth every morning         Ergocalciferol 50 MCG (2000 UT) TABS 50 mcg every morning         fish oil-omega-3 fatty acids 1000 MG capsule Take 2 g by mouth every morning         gabapentin (NEURONTIN) 100 MG capsule 200 mg At Bedtime         hydrochlorothiazide (HYDRODIURIL) 50 MG tablet Take 50 mg by mouth every morning         lisinopril (ZESTRIL) 40 MG tablet Take 40 mg by mouth every morning         magnesium 100 MG CAPS 200 mg At Bedtime         metoprolol succinate ER (TOPROL XL) 25 MG 24 hr tablet Take 25 mg by mouth every morning         mupirocin (BACTROBAN) 2 % external ointment as needed         nitroGLYcerin (NITROSTAT) 0.4 MG sublingual tablet 0.4 mg every 5 minutes as needed         omeprazole (PRILOSEC) 20 MG DR capsule 20 mg At Bedtime         PARoxetine (PAXIL) 20 MG tablet Take 20 mg by mouth every morning         pravastatin (PRAVACHOL) 10 MG tablet Take 10 mg by mouth every morning         sodium fluoride dental gel (PREVIDENT) 1.1 % GEL topical gel Brush 2x/day.  Do not eat or drink for 30 minutes after.         spironolactone (ALDACTONE) 50 MG tablet 100 mg every morning         zolpidem (AMBIEN) 10 MG tablet 10 mg nightly as needed         nystatin (MYCOSTATIN) 298932 UNIT/ML suspension SWISH AND SWALLOW 5 ML BY MOUTH 4 TIMES A DAY. (Patient not taking: Reported on 9/8/2022)                Allergies        Allergies   Allergen Reactions     Atorvastatin       Kiwi       Latex       Other Drug Allergy (See Comments)         X ray dye and shingrex vaccine          Social History  Social History   Social History            Socioeconomic History     Marital status: Single       Spouse name: Not on file     Number of children: Not on file     Years of education: Not on file      Highest education level: Not on file   Occupational History     Not on file   Tobacco Use     Smoking status: Former Smoker       Packs/day: 1.00       Years: 25.00       Pack years: 25.00       Types: Cigarettes       Quit date: 1992       Years since quittin.6     Smokeless tobacco: Never Used   Substance and Sexual Activity     Alcohol use: Yes       Comment: socially     Drug use: Not on file     Sexual activity: Not on file   Other Topics Concern     Not on file   Social History Narrative     Not on file      Social Determinants of Health      Financial Resource Strain: Not on file   Food Insecurity: Not on file   Transportation Needs: Not on file   Physical Activity: Not on file   Stress: Not on file   Social Connections: Not on file   Intimate Partner Violence: Not on file   Housing Stability: Not on file            Family History  Family History         Family History   Problem Relation Age of Onset     Lung Cancer Mother       LUNG DISEASE Father       Hypothyroidism Sister       Hypothyroidism Sister       Osteosarcoma Sister       Anesthesia Reaction No family hx of              Review of Systems  The complete review of systems is negative other than noted in the HPI or here.       Physical Exam  Vitals stable  Constitutional: Awake, alert, no apparent distress, and appears stated age.  HENT: Normocephalic  Respiratory: non labored breathing; no cough  Neurologic: Oriented to name, place and time. No focal deficits  RESP: normal breath sounds  Abd: soft, BS normal  Neuropsychiatric: Calm, cooperative. Normal affect.      Prior Labs/Diagnostic Studies   All labs and imaging personally reviewed         Lab Results   Component Value Date     WBC 8.3 2022            Lab Results   Component Value Date     RBC 4.39 2022            Lab Results   Component Value Date     HGB 13.1 2022            Lab Results   Component Value Date     HCT 40.0 2022            Lab Results    Component Value Date     MCV 91 2022            Lab Results   Component Value Date     MCH 29.8 2022            Lab Results   Component Value Date     MCHC 32.8 2022            Lab Results   Component Value Date     RDW 13.6 2022            Lab Results   Component Value Date      2022      Last Comprehensive Metabolic Panel:        Sodium   Date Value Ref Range Status   2022 138 136 - 145 mmol/L Final            Potassium   Date Value Ref Range Status   2022 5.4 (H) 3.4 - 5.3 mmol/L Final            Chloride   Date Value Ref Range Status   2022 102 98 - 107 mmol/L Final            Carbon Dioxide (CO2)   Date Value Ref Range Status   2022 25 22 - 29 mmol/L Final            Anion Gap   Date Value Ref Range Status   2022 11 7 - 15 mmol/L Final            Glucose   Date Value Ref Range Status   2022 108 (H) 70 - 99 mg/dL Final            Urea Nitrogen   Date Value Ref Range Status   2022 25.9 (H) 8.0 - 23.0 mg/dL Final            Creatinine   Date Value Ref Range Status   2022 0.88 0.51 - 0.95 mg/dL Final              GFR Estimate   Date Value Ref Range Status   2022 69 >60 mL/min/1.73m2 Final       Comment:       Effective 2021 eGFRcr in adults is calculated using the  CKD-EPI creatinine equation which includes age and gender (Skye valente al., [place], DOI: 10.1056/IOJFuu3885481)            Calcium   Date Value Ref Range Status   2022 9.8 8.8 - 10.2 mg/dL Final            Bilirubin Total   Date Value Ref Range Status   2022 0.8 <=1.2 mg/dL Final            Alkaline Phosphatase   Date Value Ref Range Status   2022 81 35 - 104 U/L Final            ALT   Date Value Ref Range Status   2022 21 10 - 35 U/L Final            AST   Date Value Ref Range Status   2022 23 10 - 35 U/L Final      INR 1.08  PTT 29     OSH 22 TSH 1.09     EK22 Sinus rhythm     Carotid US  9/8/22  IMPRESSION:     1. RIGHT ICA: Less than 50% diameter narrowing by grayscale imaging  and sonographic velocity criteria.     2. LEFT ICA:  Less than 50% diameter narrowing by grayscale imaging  and sonographic velocity criteria.     8/24/22 Echocardiogram   Summary   1. The left ventricular size is normal.  Systolic function is normal.  The estimated ejection fraction is 65%.  Wall thickness is normal.  Left ventricular segmental wall motion is normal.   2. The right ventricular size is normal.  Systolic function is normal.   3. There is mild aortic valve stenosis with a peak velocity of 269.00 cm/s, mean gradient of 18 mmHg, and aortic valve area of 1.54 cm2.   4. There is mild aortic valve regurgitation.   5. Compared to prior no significant change in AS.       8/24/22 Coronary Angiogram  Conclusions   1. Severe multivessel coronary artery disease. 70% prox LAD, 80% D1, 80% mid LCx, 70% OM1, 80% mid RCA.   2. Normal filling pressures. RA 4 mmHg, PA 25/11 (16) mmHg, PCWP/LVEDP 12 mmHg.   3. 20 mmHg peak to peak gradient across AV on pull back.   Recommendations   Continue medical management and risk factor modification.   Eval for CABG.      CXR 8/18/22   IMPRESSION: Negative chest.     CT Chest 9/8/22  IMPRESSION:   1. Decreased micronodular and ground glass opacities in the right  middle lobe, likely improving infectious process, with differential  including nontuberculous mycobacterial infection.  2. New 4 mm solid pulmonary nodule in the right upper lobe. If the  patient is low risk for lung cancer, no follow-up is required. If the  patient is high risk, consider follow-up chest CT in 12 months per  Fleischner Society Guidelines.        The patient's records and results personally reviewed by me.      Outside records reviewed from: Care Everywhere        Assessment     Luis Alberto Garcia is a 72 year old female with severe coronary artery disease for which she was recommended CABG. I agree with this plan. I  discussed the risks and benefits of surgery with patient and family including the risks of death, bleeding, stroke, infection, renal failure and arrhythmias. She understands and is willing to proceed with surgery.  She also has mild aortic stenosis on the ECHO. My recommendation would be to follow this with annual echo and consider her for TAVR at a later date.

## 2024-04-06 LAB
ATRIAL RATE - MUSE: 73 BPM
DIASTOLIC BLOOD PRESSURE - MUSE: NORMAL MMHG
INTERPRETATION ECG - MUSE: NORMAL
P AXIS - MUSE: 66 DEGREES
PR INTERVAL - MUSE: 190 MS
QRS DURATION - MUSE: 78 MS
QT - MUSE: 372 MS
QTC - MUSE: 409 MS
R AXIS - MUSE: 72 DEGREES
SYSTOLIC BLOOD PRESSURE - MUSE: NORMAL MMHG
T AXIS - MUSE: 86 DEGREES
VENTRICULAR RATE- MUSE: 73 BPM

## 2024-08-19 ENCOUNTER — TELEPHONE (OUTPATIENT)
Dept: CARDIOLOGY | Facility: CLINIC | Age: 74
End: 2024-08-19
Payer: COMMERCIAL

## 2024-08-19 NOTE — TELEPHONE ENCOUNTER
8/19 Patient confirmed scheduled appointment:  Date: 10/10/2024  Time: 11:30 am  Visit type: New Lipid Management  Provider: José  Location: [facility] [place]  Testing/imaging: N/A  Additional notes: N/A

## 2024-10-04 NOTE — TELEPHONE ENCOUNTER
RECORDS RECEIVED FROM:    DATE RECEIVED:    NOTES STATUS DETAILS   OFFICE NOTE from referring provider  Internal    OFFICE NOTE from other cardiologists  Internal    RECORDS from hospital/ED N/A    MEDICATION LIST Internal    GENERAL CARDIO RECORDS   (ALL APPOINTMENT TYPES)     LABS (CBC,BMP,CMP, TSH) Internal    EKG (STRIPS & REPORTS) Internal 4-4-24   MONITORS (STRIPS & REPORTS) N/A    ECHOS (IMAGES AND REPORTS) N/A    STRESS TESTS (IMAGES AND REPORTS) In process 5-11-23 Requested   cMRI (IMAGES AND REPORTS) N/A    Cardiac cath (IMAGES AND REPORTS) N/A    CT/CTA (IMAGES AND REPORTS) In process 10-5-23 Requested     Action 10/4/24 [facility]  Fax #[phone]   Action Taken Requested CTA Chest 10-5-23    Resolved CTA in PACS 10/8       Action 10/4/24 [facility] Echo  Fax #[phone]   Action Taken Requested NM myocard 5-11-23    Resolved images in PACS 10/8

## 2024-10-10 ENCOUNTER — OFFICE VISIT (OUTPATIENT)
Dept: CARDIOLOGY | Facility: CLINIC | Age: 74
End: 2024-10-10
Attending: NURSE PRACTITIONER
Payer: COMMERCIAL

## 2024-10-10 ENCOUNTER — LAB (OUTPATIENT)
Dept: LAB | Facility: CLINIC | Age: 74
End: 2024-10-10
Payer: COMMERCIAL

## 2024-10-10 ENCOUNTER — PRE VISIT (OUTPATIENT)
Dept: CARDIOLOGY | Facility: CLINIC | Age: 74
End: 2024-10-10
Payer: COMMERCIAL

## 2024-10-10 VITALS
OXYGEN SATURATION: 97 % | SYSTOLIC BLOOD PRESSURE: 104 MMHG | HEART RATE: 63 BPM | DIASTOLIC BLOOD PRESSURE: 56 MMHG | BODY MASS INDEX: 26.73 KG/M2 | WEIGHT: 168.1 LBS

## 2024-10-10 DIAGNOSIS — Z78.9 STATIN INTOLERANCE: ICD-10-CM

## 2024-10-10 DIAGNOSIS — R07.2 PRECORDIAL PAIN: ICD-10-CM

## 2024-10-10 DIAGNOSIS — Z95.1 S/P CABG (CORONARY ARTERY BYPASS GRAFT): ICD-10-CM

## 2024-10-10 DIAGNOSIS — Z95.1 S/P CABG (CORONARY ARTERY BYPASS GRAFT): Primary | ICD-10-CM

## 2024-10-10 DIAGNOSIS — E78.5 HYPERLIPIDEMIA WITH TARGET LOW DENSITY LIPOPROTEIN (LDL) CHOLESTEROL LESS THAN 70 MG/DL: ICD-10-CM

## 2024-10-10 DIAGNOSIS — I25.718 CORONARY ARTERY DISEASE INVOLVING AUTOLOGOUS VEIN CORONARY BYPASS GRAFT WITH OTHER FORMS OF ANGINA PECTORIS (H): ICD-10-CM

## 2024-10-10 DIAGNOSIS — I25.10 CAD (CORONARY ARTERY DISEASE): ICD-10-CM

## 2024-10-10 PROCEDURE — 36415 COLL VENOUS BLD VENIPUNCTURE: CPT | Performed by: PATHOLOGY

## 2024-10-10 PROCEDURE — 99000 SPECIMEN HANDLING OFFICE-LAB: CPT | Performed by: PATHOLOGY

## 2024-10-10 PROCEDURE — G0463 HOSPITAL OUTPT CLINIC VISIT: HCPCS | Performed by: INTERNAL MEDICINE

## 2024-10-10 PROCEDURE — 83695 ASSAY OF LIPOPROTEIN(A): CPT | Performed by: INTERNAL MEDICINE

## 2024-10-10 PROCEDURE — 99214 OFFICE O/P EST MOD 30 MIN: CPT | Performed by: INTERNAL MEDICINE

## 2024-10-10 ASSESSMENT — PAIN SCALES - GENERAL: PAINLEVEL: NO PAIN (0)

## 2024-10-10 NOTE — NURSING NOTE
Chief Complaint   Patient presents with    New Patient     Larry Kumar pt, referred d/t HLD + statin and repatha intolerance       Vitals were taken, medications reconciled.    Marcela Butts, Clinic Assistant   11:30 AM

## 2024-10-10 NOTE — PROGRESS NOTES
HPI:     I had the privilege to evaluate and examine Mr Papo Garcia, who is 74 yr old female patient  because dyspnea on exertion/atypical chest pain. With a HX of CABG.    She denies palpitations.    The arthritis in her lower extremities prevents her from walking on treadmill.     She has hypertension, hyperlipidemia, prediabetes.    PAST MEDICAL HISTORY:  Past Medical History:   Diagnosis Date    Anxiety     Centrilobular emphysema (H)     Childhood asthma     Coronary artery disease involving native coronary artery of native heart     Esophageal reflux     Essential hypertension     Fatty liver disease     Hair loss     Migraine     Mild aortic stenosis     Mixed hyperlipidemia     MRSA infection     Skin abscesses after COVID vaccinations    Osteoarthritis     Primary insomnia        CURRENT MEDICATIONS:  Current Outpatient Medications   Medication Sig Dispense Refill    acyclovir (ZOVIRAX) 400 MG tablet Take 400 mg by mouth daily as needed      albuterol (PROAIR HFA/PROVENTIL HFA/VENTOLIN HFA) 108 (90 Base) MCG/ACT inhaler Inhale 2 puffs into the lungs every 6 hours      aspirin (ASA) 81 MG chewable tablet Take 1 tablet (81 mg) by mouth daily 60 tablet 3    Coenzyme Q10 (CO Q 10) 10 MG CAPS Take 1 capsule by mouth daily      diltiazem ER (TIAZAC) 120 MG 24 hr ER beaded capsule Take 120 mg by mouth daily      Ergocalciferol 50 MCG (2000 UT) TABS 50 mcg every morning      fish oil-omega-3 fatty acids 1000 MG capsule Take 2 g by mouth every morning      furosemide (LASIX) 20 MG tablet Take 1 tablet (20 mg) by mouth daily as needed (take for increased shortness of breath, leg swelling) 90 tablet 2    gabapentin (NEURONTIN) 100 MG capsule 200 mg At Bedtime      lisinopril (ZESTRIL) 20 MG tablet Take 20 mg by mouth daily      magnesium 100 MG CAPS 200 mg At Bedtime      metoprolol succinate ER (TOPROL XL) 25 MG 24 hr tablet Take 25 mg by mouth every morning      mupirocin (BACTROBAN) 2 % external ointment as  needed      nitroGLYcerin (NITROSTAT) 0.4 MG sublingual tablet 0.4 mg every 5 minutes as needed      nystatin (MYCOSTATIN) 879819 UNIT/ML suspension Swish and swallow or swish and spit 1 TEASPOONFUL 4 times per day for 10 days      omeprazole (PRILOSEC) 20 MG DR capsule 20 mg At Bedtime      ondansetron (ZOFRAN) 4 MG tablet Take 1 tablet (4 mg) by mouth every 8 hours as needed for nausea (take with ozempic dose increase) 30 tablet 0    PARoxetine (PAXIL) 20 MG tablet Take 20 mg by mouth every morning      sodium fluoride dental gel (PREVIDENT) 1.1 % GEL topical gel Brush 2x/day.  Do not eat or drink for 30 minutes after.      zolpidem (AMBIEN) 5 MG tablet Take 0.5 tablets (2.5 mg) by mouth nightly as needed for sleep 10 tablet 0    evolocumab (REPATHA) 140 MG/ML prefilled autoinjector Inject 1 mL (140 mg) Subcutaneous every 14 days (Patient not taking: Reported on 10/10/2024) 1 mL 11    methocarbamol (ROBAXIN) 500 MG tablet Take 2 tablets (1,000 mg) by mouth every 6 hours as needed for muscle spasms or other (pain) (Patient not taking: Reported on 1/6/2023) 100 tablet 0    sulfamethoxazole-trimethoprim (BACTRIM) 400-80 MG tablet Take 1 tablet by mouth 2 times daily (Patient not taking: Reported on 9/30/2022) 14 tablet 0       PAST SURGICAL HISTORY:  Past Surgical History:   Procedure Laterality Date    BYPASS GRAFT ARTERY CORONARY N/A 9/14/2022    Procedure: MEDIAN STERNOTOMY, CORONARY ARTERY BYPASS GRAFT (CABG) X  5 USING THE LEFT INTERNAL MAMMARY AND ENDOSCOPICALLY HARVESTED RIGHT AND LEFT SAPHENOUS VEIN, ON CARDIOPULMONARY BYPASS, INTRAOPERATIVE TRANSESOPHAGEAL ECHOCARDIOGRAM BY ANESTHESIA.;  Surgeon: Galo Pathak MD;  Location: [facility] OR    CATARACT IOL, RT/LT      IR LUMBAR EPIDURAL STEROID INJECTION  10/06/2005    IR LUMBAR EPIDURAL STEROID INJECTION  11/09/2005    IR THORACENTESIS  9/23/2022    Left fibular fracture ORIF  2013    REPLACEMENT TOTAL KNEE Left 2020    REPLACEMENT TOTAL KNEE Right 11/2021    ROTATOR  CUFF REPAIR RT/LT Right 2017       ALLERGIES     Allergies   Allergen Reactions    Atorvastatin     Kiwi     Latex     Other Drug Allergy (See Comments)      X ray dye and shingrex vaccine        FAMILY HISTORY:  Family History   Problem Relation Age of Onset    Lung Cancer Mother     LUNG DISEASE Father     Hypothyroidism Sister     Hypothyroidism Sister     Osteosarcoma Sister     Anesthesia Reaction No family hx of        SOCIAL HISTORY:  Social History     Socioeconomic History    Marital status:      Spouse name: None    Number of children: None    Years of education: None    Highest education level: None   Tobacco Use    Smoking status: Former     Current packs/day: 0.00     Average packs/day: 1 pack/day for 25.0 years (25.0 ttl pk-yrs)     Types: Cigarettes     Start date: 1967     Quit date: 1992     Years since quittin.7    Smokeless tobacco: Never   Substance and Sexual Activity    Alcohol use: Yes     Comment: socially       ROS:   Constitutional: No fever, chills, or sweats. No weight gain/loss   ENT: No visual disturbance, ear ache, epistaxis, sore throat  Allergies/Immunologic: Negative.   Respiratory: No cough, hemoptysia  Cardiovascular: As per HPI  GI: No nausea, vomiting, hematemesis, melena, or hematochezia  : No urinary frequency, dysuria, or hematuria  Integument: Negative  Psychiatric: Negative  Neuro: Negative  Endocrinology: Negative   Musculoskeletal: Negative    EXAM:  /56 (BP Location: Left arm, Patient Position: Chair, Cuff Size: Adult Regular)   Pulse 63   Wt 76.2 kg (168 lb 1.6 oz)   SpO2 97%   BMI 26.73 kg/m    In general, the patient is a pleasant female in no apparent distress.    HEENT: NC/AT.  PERRLA.  EOMI.  Sclerae white, not injected.  Nares clear.  Pharynx without erythema or exudate.  Dentition intact.    Neck: No adenopathy.  No thyromegaly. Carotids +4/4 bilaterally without bruits.  No jugular venous distension.   Heart: RRR. Normal S1, S2  splits physiologically. No murmur, rub, click, or gallop. The PMI is in the 5th ICS in the midclavicular line. There is no heave.    Lungs: CTA.  No ronchi, wheezes, rales.  No dullness to percussion.   Abdomen: Soft, nontender, nondistended. No organomegaly.  No bruits.   Extremities: No clubbing, cyanosis, or edema.  The pulses are +4/4 at the radial, brachial, femoral, popliteal, DP, and PT sites bilaterally.  No bruits are noted.  Neurologic: Alert and oriented to person/place/time, normal speech, gait and affect  Skin: No petechiae, purpura or rash.    Labs:  LIPID RESULTS:  Lab Results   Component Value Date    TRIG 96 05/11/2023       LIVER ENZYME RESULTS:  Lab Results   Component Value Date    AST 30 09/21/2022    ALT 30 09/21/2022       CBC RESULTS:  Lab Results   Component Value Date    WBC 9.5 10/17/2022    RBC 4.47 10/17/2022    HGB 12.4 10/17/2022    HCT 39.4 10/17/2022    MCV 88 10/17/2022    MCH 27.7 10/17/2022    MCHC 31.5 10/17/2022    RDW 13.9 10/17/2022     (H) 10/17/2022       BMP RESULTS:  Lab Results   Component Value Date     (L) 10/17/2022    POTASSIUM 4.6 10/17/2022    POTASSIUM 4.1 09/14/2022    CHLORIDE 101 05/11/2023    CO2 25 10/17/2022    ANIONGAP 11 10/17/2022     (H) 10/17/2022     (H) 09/16/2022    BUN 22.6 10/17/2022    CR 0.70 10/17/2022    GFRESTIMATED >90 10/17/2022    ALYCE 9.5 10/17/2022      Lpa < 6 mg/dl    INR RESULTS:  Lab Results   Component Value Date    INR 1.04 09/20/2022    INR 1.30 (H) 09/14/2022         Assessment and Plan:    I discussed the results with patient.  I discussed the importance of a heart healthy diet and lifestyle.  I discussed following items:    Medication Changes: None     Follow Up:   -Labs today on your way out  -Lexiscan stress test when able. Please review instructions below.  -Follow up to be determined based on results    Forest Kumar MD, PhD  Professor of Medicine  Division of Cardiology       CC  Patient Care  Team:  Shiv Gomez MD as PCP - General (Internal Medicine)  Natalya Adame APRN CNP as Nurse Practitioner (Anesthesiology)  Cara Kumar APRN CNS as Clinical Nurse Specialist (Anesthesiology)  Eleuterio Guadalupe MD as MD (Cardiovascular Disease)  Neha Weller, RN as Specialty Care Coordinator (Cardiology)  Elmer López MD as MD (Cardiovascular Disease)  Daiana Thompson NP as Nurse Practitioner (Cardiovascular Disease)  Peña Hoff APRN CNP as Assigned Surgical Provider  Melany Contreras, RN as Specialty Care Coordinator (Cardiology)  PEÑA HOFF

## 2024-10-10 NOTE — Clinical Note
10/10/2024      RE: Luis Alberto Garcia  [address] [place] [address]       Dear Colleague,    Thank you for the opportunity to participate in the care of your patient, Luis Alberto Garcia, at the [facility]. Please see a copy of my visit note below.    No notes on file    Please do not hesitate to contact me if you have any questions/concerns.     Sincerely,     Forest Kumar MD

## 2024-10-10 NOTE — NURSING NOTE
October 10, 2024    Medication Changes: None      Follow Up:   -Labs today   -Lexiscan stress test when able. Writer reviewed pre-procedure instructions with pt (see AVS).  -Follow up to be determined based on results.    Patient verbalized understanding of all health information given and agreed to call with further questions or concerns.      Melany Contreras RN

## 2024-10-10 NOTE — PATIENT INSTRUCTIONS
October 10, 2024    Cardiology Provider You Saw During Your Visit: Dr. Kumar      Medication Changes: None      Follow Up:   -Labs today on your way out  -Lexiscan stress test when able. Please review instructions below.  -Follow up to be determined based on results.  ---------------------------------------------------------------------------------------------------------------------------------------------------  Pre-procedure instructions - NM Treadmill, Adenodine, Dobutamine, or MPU with Lexiscan  Patient Education    Your arrival time is TBD.  Location is [facility].  How do I prepare for my exam?: (Food and drink instructions)  *12 hours before your test stop all caffeine. This includes coffee, decaf coffee (has small amounts of caffeine), tea, soda, chocolate and some medications (such as Anacin, Excedrin, NoDoz).  Stop drinking alcohol, smoking and using tobacco.  *The morning of the test stop eating 3 hours before the test.     How do I prepare for my exam? (Medication instructions)  You may need to stop some medicines before the test. Follow your doctor's orders.    *2 days before:  Stop taking dipyridamole (Aggrenox, Persantine, Permole)     **1 day before:  If you take a beta blocker, follow the instructions from your health care team.  Before the test, your doctor may ask you to stop taking your beta blocker. If so: Do not take it the day before your test or the day of your test. Bring it with you to take after the test.  Some common beta blockers: atenolol (Tenormin), metoprolol (Lopressor Toprol XL), bisoprolol (Zebeta), nadolol (Corgard), nebivolol (Bystolic), propranolol (Inderal, InnoPran XL)     *12 hours before:  Stop taking Theophylline (Elixophyllin, Rj-24, Theolair) & Aminophylline     *Morning of the test:  Do not take Nitrates [eg. isosorbide dinitrate (Isordil), isosorbide mononitrate (Imdur, BiDil), nitroglycerin (Nitrostat, Nitromist, Nitro-Bid)] or wear a Nitro Patch (Transderm-Nitro).  If  you take insulin or a beta blocker, follow the instructions from your health care provider.     What should I wear?:  Please wear a loose two-piece outfit. If you will have an exercise test, bring rubber-soled walking shoes.     How long does the exam take?: *This test can take 1-2 days.*  ONE day exam: Allow 3-4 hours for test.  IF TWO day exam: Allow 1 1/2 to 2 hours for test.     What should I bring?:  It is safest to leave personal items at home. Please bring a list of your medicines.     Do I need a ?:  No  is needed.     What do I need to tell my doctor?: Tell your doctor if you are breastfeeding or if there is any chance you may be pregnant.   If you think you may need sedation (medicine to help you relax).     What should I do after the exam?:  No restrictions, you may resume normal activities. The radioactive fluid will leave your body when you urinate (use the toilet). If you drink extra fluids, it will leave your body faster.     What is this test?:  Your doctor has ordered a nuclear stress test to check how well blood is flowing through your heart. You will either exercise or take a medicine that mimics exercise; we will watch your heart.     Who should I call with questions?:  If you have any questions, please call the Imaging Department where you will have your exam. Directions, parking instructions, and other information are available on our website, [email or web address].  ---------------------------------------------------------------------------------------------------------------------------------------------------      Follow the American Heart Association Diet and Lifestyle Recommendations:  -Limit saturated fat, trans fat, sodium, red meat, sweets and sugar-sweetened beverages. If you choose to eat red meat, compare labels and select the leanest cuts available.  -Aim for at least 150 minutes of moderate physical activity or 75 minutes of vigorous physical activity - or an equal  combination of both - each week.      To Reach Us:  -During business hours: [phone], press option # 1 to schedule an appointment or to leave a message for your care team.     -After hours, weekends or holidays: [phone], press option #4 and ask to speak to the on-call cardiologist. Inform them you are a patient at the [facility].        **If you have a cardiac device, please make sure you schedule an in-person device check just prior to your cardiology provider appointments**        Melany Contreras RN  Cardiology Care Coordinator - General Cardiology  [facility]

## 2024-10-11 LAB — APO A-I SERPL-MCNC: <6 MG/DL

## 2024-12-14 ENCOUNTER — HEALTH MAINTENANCE LETTER (OUTPATIENT)
Age: 74
End: 2024-12-14

## 2024-12-19 ENCOUNTER — TELEPHONE (OUTPATIENT)
Dept: CARDIOLOGY | Facility: CLINIC | Age: 74
End: 2024-12-19
Payer: COMMERCIAL

## 2024-12-19 NOTE — TELEPHONE ENCOUNTER
Patient Contacted for the patient to call back and schedule the following:    Appointment type: RTN CARDIO  Provider: CYNTHIA  Return date: 4/17/2025  Specialty phone number: [phone] OPT 1  Additional appointment(s) needed: N/A  Additonal Notes: N/A

## 2025-04-06 ENCOUNTER — HEALTH MAINTENANCE LETTER (OUTPATIENT)
Age: 75
End: 2025-04-06

## 2025-04-17 ENCOUNTER — OFFICE VISIT (OUTPATIENT)
Dept: CARDIOLOGY | Facility: CLINIC | Age: 75
End: 2025-04-17
Attending: NURSE PRACTITIONER
Payer: COMMERCIAL

## 2025-04-17 ENCOUNTER — ORDERS ONLY (AUTO-RELEASED) (OUTPATIENT)
Dept: CARDIOLOGY | Facility: CLINIC | Age: 75
End: 2025-04-17

## 2025-04-17 VITALS
WEIGHT: 171.6 LBS | SYSTOLIC BLOOD PRESSURE: 120 MMHG | DIASTOLIC BLOOD PRESSURE: 68 MMHG | OXYGEN SATURATION: 96 % | BODY MASS INDEX: 27.28 KG/M2 | HEART RATE: 71 BPM

## 2025-04-17 DIAGNOSIS — Z78.9 STATIN INTOLERANCE: ICD-10-CM

## 2025-04-17 DIAGNOSIS — I25.718 CORONARY ARTERY DISEASE INVOLVING AUTOLOGOUS VEIN CORONARY BYPASS GRAFT WITH OTHER FORMS OF ANGINA PECTORIS: Primary | ICD-10-CM

## 2025-04-17 DIAGNOSIS — E78.5 HYPERLIPIDEMIA WITH TARGET LOW DENSITY LIPOPROTEIN (LDL) CHOLESTEROL LESS THAN 70 MG/DL: ICD-10-CM

## 2025-04-17 DIAGNOSIS — I48.0 PAF (PAROXYSMAL ATRIAL FIBRILLATION) (H): ICD-10-CM

## 2025-04-17 DIAGNOSIS — I49.3 PVC'S (PREMATURE VENTRICULAR CONTRACTIONS): ICD-10-CM

## 2025-04-17 DIAGNOSIS — I35.1 NONRHEUMATIC AORTIC VALVE INSUFFICIENCY: ICD-10-CM

## 2025-04-17 DIAGNOSIS — I25.118 CORONARY ARTERY DISEASE OF NATIVE ARTERY OF NATIVE HEART WITH STABLE ANGINA PECTORIS: ICD-10-CM

## 2025-04-17 DIAGNOSIS — Z95.1 S/P CABG (CORONARY ARTERY BYPASS GRAFT): ICD-10-CM

## 2025-04-17 DIAGNOSIS — I35.0 MODERATE AORTIC STENOSIS: ICD-10-CM

## 2025-04-17 LAB
ATRIAL RATE - MUSE: 74 BPM
DIASTOLIC BLOOD PRESSURE - MUSE: NORMAL MMHG
INTERPRETATION ECG - MUSE: NORMAL
P AXIS - MUSE: 69 DEGREES
PR INTERVAL - MUSE: 178 MS
QRS DURATION - MUSE: 78 MS
QT - MUSE: 390 MS
QTC - MUSE: 432 MS
R AXIS - MUSE: 63 DEGREES
SYSTOLIC BLOOD PRESSURE - MUSE: NORMAL MMHG
T AXIS - MUSE: 54 DEGREES
VENTRICULAR RATE- MUSE: 74 BPM

## 2025-04-17 PROCEDURE — 93005 ELECTROCARDIOGRAM TRACING: CPT

## 2025-04-17 PROCEDURE — G0463 HOSPITAL OUTPT CLINIC VISIT: HCPCS | Performed by: NURSE PRACTITIONER

## 2025-04-17 RX ORDER — IPRATROPIUM BROMIDE 21 UG/1
1 SPRAY, METERED NASAL EVERY 12 HOURS
COMMUNITY
Start: 2024-12-09

## 2025-04-17 RX ORDER — ROSUVASTATIN CALCIUM 20 MG/1
20 TABLET, COATED ORAL DAILY
COMMUNITY

## 2025-04-17 RX ORDER — ONDANSETRON 4 MG/1
TABLET, ORALLY DISINTEGRATING ORAL
COMMUNITY
Start: 2025-03-17

## 2025-04-17 RX ORDER — PSYLLIUM HUSK 0.4 G
CAPSULE ORAL
COMMUNITY
Start: 2025-01-26

## 2025-04-17 ASSESSMENT — PAIN SCALES - GENERAL: PAINLEVEL_OUTOF10: NO PAIN (0)

## 2025-04-17 NOTE — PROGRESS NOTES
"  [facility] [place] [facility] [place]      HPI:   Ms. Garcia is a 74 year old female with medical history pertinent for coronary disease (status post CABG in September 2022), hypertension, post-op AF, and former smoking who presents to cardiology clinic for routine follow up.     Briefly, Ms. Garcia had been experiencing ~10 months of jaw/neck pain and dyspnea on exertion until she had a pharmacologic SPECT at [facility] in July 2022.  While the stress/perfusion part was unremarkable, she exhibited 1 mm downsloping ST depressions in the ECG portion (territory unclear if Care Everywhere records).  She subsequently underwent invasive coronary angiography at [facility], which yielded a 70% proximal LAD disease, with an 80% first diagonal lesion, 80% mid circumflex lesion, 70% OM1 lesion, and a 80% mid RCA lesion.  Ms. Garcia was then referred to cardiac surgery at the [facility] for coronary bypass grafting, which she successfully underwent in September 2022, as below.     Ms. Garcia's postoperative course was complicated by a left-sided pleural effusion that required thoracentesis and postoperative atrial fibrillation.  Ms. Garcia's atrial fibrillation manifested as palpitations, dyspnea, and presyncope.  An ECG in the cardiac surgery clinic demonstrated atrial fibrillation and she was subsequently sent to the ER.  In the emergency room, she underwent DCCV after she was started on amiodarone infusion.  Ms. Garcia was transitioned to oral amiodarone regimen along with apixaban and metoprolol succinate and has since followed up with Dr. López.    Most recently, in January 2023 Ms. Garcia established general cardiology care with Dr. Doshi. At that time she stated that she was \"nearly back to normal\".  She was interested in resuming downhill skiing this winter.  She has had issues with myalgias in the past with pravastatin, atorvastatin, and rosuvastatin.  Her primary care team had " "been administering a low-dose of rosuvastatin to see if she will be able to tolerate it.  Ms. Garcia had an unpleasant experience in cardiac rehab and decided to instead ramp up her physical activity at home. At her visit with Dr. Doshi they discussed that if she was unable to tolerate rosuvastatin and or it did not decrease her LDL to less than 70, it was advisable to initiate a PCSK9 inhibitor.     At our visit in April 2023, Ms. Garcia was quite concerned about worsening symptoms that included fatigue, dyspnea on exertion, palpitations, and neck and jaw pain. First noticed symptoms in February. Reported breathlessness after walking up a few stairs and that with exertion she was feeling a discomfort in her neck that \"feels like a sore throat\" and then jaw begins hurting. She had this type of neck/jaw pain prior to her CABG, but at a more severe degree. Initial evaluation completed by her PCP and included a CXR and EKG which were normal. Ms. Garcia increased her lasix to 40 mg and felt a dramatic improvement in her breathing.  Denied any other HF symptoms. Given these symptoms, elected to proceed with NM stress test and ambulatory monitor. NM stress test was completed through [facility] and was negative for ischemia. EF 74%. She also completed ABIs which were also normal. Reported stomach upset with rosuvastatin which she was only taking 5 mg M/W/F. Repatha at that time was cost prohibitive.      Follow up May 2023, reported dyspnea on exertion, but feeling less tired and no reports of palpitations. Also reported stomach upset, worse on days she takes rosuvastatin. Discussed starting Repatha with insurance. Limited coverage. Price per dose $400. She does not qualify for patient assistance. Previous PFTs with evidence of emphysema. Instructed to follow up with PCP for further evaluation of her LOPEZ.     Today, Ms. Garcia reports feeling more fatigued with some mild LOPEZ. She tired taking lasix without improvement " in symptoms. She also notes increased episodes of palpitations, both more frequent and longer in duration. Recently evaluated by PCP for headaches with severe dizziness. She was treated with doxy and had resolution of symptoms. She stopped taking her Repatha sometime last year. She thought it was causing leg cramping. FLP from 2025 with . Denies acute chest pain or tightness. No syncope or presyncope. No orthopnea, PND, LE edema.       Cardiac Medications   - ASA 81 mg daily   - Lasix 20 mg daily PRN   - Lisinopril 20 mg daily   - Repatha-not taking   - Rosuvastatin 10 mg daily PRN  - Diltiazem 120 mg daily       PAST MEDICAL HISTORY:  Past Medical History:   Diagnosis Date    Anxiety     Centrilobular emphysema (H)     Childhood asthma     Coronary artery disease involving native coronary artery of native heart     Esophageal reflux     Essential hypertension     Fatty liver disease     Hair loss     Migraine     Mild aortic stenosis     Mixed hyperlipidemia     MRSA infection     Skin abscesses after COVID vaccinations    Osteoarthritis     Primary insomnia        FAMILY HISTORY:  Family History   Problem Relation Age of Onset    Lung Cancer Mother     LUNG DISEASE Father     Hypothyroidism Sister     Hypothyroidism Sister     Osteosarcoma Sister     Anesthesia Reaction No family hx of        SOCIAL HISTORY:  Social History     Socioeconomic History    Marital status:    Tobacco Use    Smoking status: Former     Packs/day: 1.00     Years: 25.00     Pack years: 25.00     Types: Cigarettes     Quit date: 1992     Years since quittin.2    Smokeless tobacco: Never   Substance and Sexual Activity    Alcohol use: Yes     Comment: socially       CURRENT MEDICATIONS:  Current Outpatient Medications   Medication Sig Dispense Refill    acyclovir (ZOVIRAX) 400 MG tablet Take 400 mg by mouth daily as needed      albuterol (PROAIR HFA/PROVENTIL HFA/VENTOLIN HFA) 108 (90 Base) MCG/ACT inhaler Inhale 2  puffs into the lungs every 6 hours      aspirin (ASA) 81 MG chewable tablet Take 1 tablet (81 mg) by mouth daily 60 tablet 3    Coenzyme Q10 (CO Q 10) 10 MG CAPS Take 1 capsule by mouth daily      diltiazem ER (TIAZAC) 120 MG 24 hr ER beaded capsule Take 120 mg by mouth daily      Ergocalciferol 50 MCG (2000 UT) TABS 50 mcg every morning      evolocumab (REPATHA) 140 MG/ML prefilled autoinjector Inject 1 mL (140 mg) Subcutaneous every 14 days (Patient not taking: Reported on 10/10/2024) 1 mL 11    fish oil-omega-3 fatty acids 1000 MG capsule Take 2 g by mouth every morning      furosemide (LASIX) 20 MG tablet Take 1 tablet (20 mg) by mouth daily as needed (take for increased shortness of breath, leg swelling) 90 tablet 2    gabapentin (NEURONTIN) 100 MG capsule 200 mg At Bedtime      lisinopril (ZESTRIL) 20 MG tablet Take 20 mg by mouth daily      magnesium 100 MG CAPS 200 mg At Bedtime      methocarbamol (ROBAXIN) 500 MG tablet Take 2 tablets (1,000 mg) by mouth every 6 hours as needed for muscle spasms or other (pain) (Patient not taking: Reported on 1/6/2023) 100 tablet 0    metoprolol succinate ER (TOPROL XL) 25 MG 24 hr tablet Take 25 mg by mouth every morning      mupirocin (BACTROBAN) 2 % external ointment as needed      nitroGLYcerin (NITROSTAT) 0.4 MG sublingual tablet 0.4 mg every 5 minutes as needed      nystatin (MYCOSTATIN) 216063 UNIT/ML suspension Swish and swallow or swish and spit 1 TEASPOONFUL 4 times per day for 10 days      omeprazole (PRILOSEC) 20 MG DR capsule 20 mg At Bedtime      ondansetron (ZOFRAN) 4 MG tablet Take 1 tablet (4 mg) by mouth every 8 hours as needed for nausea (take with ozempic dose increase) 30 tablet 0    PARoxetine (PAXIL) 20 MG tablet Take 20 mg by mouth every morning      sodium fluoride dental gel (PREVIDENT) 1.1 % GEL topical gel Brush 2x/day.  Do not eat or drink for 30 minutes after.      sulfamethoxazole-trimethoprim (BACTRIM) 400-80 MG tablet Take 1 tablet by mouth  2 times daily (Patient not taking: Reported on 9/30/2022) 14 tablet 0    zolpidem (AMBIEN) 5 MG tablet Take 0.5 tablets (2.5 mg) by mouth nightly as needed for sleep 10 tablet 0     No current facility-administered medications for this visit.       ROS:   Refer to HPI    EXAM:  /68 (BP Location: Left arm, Patient Position: Chair, Cuff Size: Adult Regular)   Pulse 71   Wt 77.8 kg (171 lb 9.6 oz)   SpO2 96%   BMI 27.28 kg/m     GENERAL: Appears comfortable, in no acute distress.   HEENT: Eye symmetrical, no discharge or icterus bilaterally. Mucous membranes moist and without lesions.  CV: RRR, +S1S2, no murmur, rub, or gallop. JVP near clavicle  RESPIRATORY: Respirations regular, even, and unlabored. Lungs CTA throughout.   GI: Soft and non distended with normoactive bowel sounds present in all quadrants. No tenderness, rebound, guarding. No hepatomegaly.   EXTREMITIES: No peripheral edema. 2+ bilateral pedal pulses.   NEUROLOGIC: Alert and oriented x 3. No focal deficits.   MUSCULOSKELETAL: No joint swelling or tenderness.   SKIN: No jaundice. No rashes or lesions.     Labs, reviewed with patient in clinic today:  CBC RESULTS:  Lab Results   Component Value Date    WBC 9.5 10/17/2022    RBC 4.47 10/17/2022    HGB 12.4 10/17/2022    HCT 39.4 10/17/2022    MCV 88 10/17/2022    MCH 27.7 10/17/2022    MCHC 31.5 10/17/2022    RDW 13.9 10/17/2022     (H) 10/17/2022       CMP RESULTS:  Lab Results   Component Value Date     (L) 10/17/2022    POTASSIUM 4.6 10/17/2022    POTASSIUM 4.1 09/14/2022    CHLORIDE 101 05/11/2023    CO2 25 10/17/2022    ANIONGAP 11 10/17/2022     (H) 10/17/2022     (H) 09/16/2022    BUN 22.6 10/17/2022    CR 0.70 10/17/2022    GFRESTIMATED >90 10/17/2022    ALYCE 9.5 10/17/2022    BILITOTAL 0.3 09/21/2022    ALBUMIN 3.2 (L) 09/21/2022    ALKPHOS 60 09/21/2022    ALT 30 09/21/2022    AST 30 09/21/2022        INR RESULTS:  Lab Results   Component Value Date    INR  "1.04 09/20/2022       Lab Results   Component Value Date    MAG 2.2 09/20/2022     No results found for: \"NTBNPI\"  Lab Results   Component Value Date    NTBNP 401 10/17/2022       Cardiac Diagnostics:          5/11/2023 NM Lexiscan (CareEverywhere)  Summary     1. A regadenoson infusion pharmacologic stress test was performed.     2. Functional capacity was not assessed.     3. Shortness of breath and jaw/throat discomfort with infusion.     4. Negative stress ECG for ST segment depression.     5. Myocardial perfusion imaging is normal.     6. Normal left ventricular systolic function. Calculated LVEF 74 %.     7. Based on this study, the annual cardiovascular mortality rate is low   (less than 1%).     Prior Study Comparison     Compared to the prior study from 7/2022, the current study reveals no   significant change.       1/6/2023 Electrocardiogram  NSR, no ischemic changes    Ambulatory ECG monitor from 11/29/2022 to 12/11/2022:      10/5/2022 Echo   Interpretation Summary  Global and regional left ventricular function is hyperkinetic with an EF of  65-70%.  Right ventricular function, chamber size, wall motion, and thickness are  normal.  Mild to moderate aortic insufficiency is present.  Pulmonary artery systolic pressure is normal.  The inferior vena cava is normal.  No significant changes noted.    8/24/2022 Echo ( Care Everywhere)  Summary     1. The left ventricular size is normal.  Systolic function is normal.     The   estimated ejection fraction is 65%.  Wall thickness is normal.  Left   ventricular segmental wall motion is normal.     2. The right ventricular size is normal.  Systolic function is normal.     3. There is mild aortic valve stenosis with a peak velocity of 269.00   cm/s,   mean gradient of 18 mmHg, and aortic valve area of 1.54 cm2.     4. There is mild aortic valve regurgitation.     5. Compared to prior no significant change in AS.         Assessment and Plan:   Ms. Garcia is a 74 year " old female with medical history pertinent for coronary disease (status post CABG in September 2022), hypertension, and former smoking who presents to cardiology clinic for routine follow up.     Reports of increased palpitations, with e/o PVCs on today's ECG. She is presently on diltiazem. We discussed options including ambulatory monitoring and increasing diltiazem. Ms. Garcia is agreeable to 7 day Zio. She would like to wait on any medication changes.   Given reports of increased LOPEZ, we will also get an updated echo to evaluate EF and AI.     Review of recent FLP not at goal with . She stopped repatha sometime last year. She thought it was a statin and causing leg cramping. She is agreeable to resuming.     # CAD s/p CABG (9/2022)  # HLD, goal LDL < 55  - Continue ASA   - discontinued BB per previous provider recommendation   - resume Repatha [id number]  - Update FLP in ~ 3 months    # HTN, controlled  - continue current antihypertensives     # Atrial fibrillation, post-operative s/p DCCV and brief AAD therapy with amiodarone  # PVCs   - ECG 1/2023: NSR  - CHADSVASC=+CAD, +age, +gender, +HTN  4, previously on eliquis, stopped in Jan '23 per EP  - continue diltiazem   - Zio monitor     # Mild to moderate aortic insufficiency   - update echo      Follow up:  - Cardiology SEVEN 6 months     Radha Hoff DNP, NP-C  General Cardiology   4/17/2025

## 2025-04-17 NOTE — NURSING NOTE
Chief Complaint   Patient presents with    Follow Up     RETURN CARDIOLOGY       Vitals were taken, medications reconciled, and EKG was performed.    Pardeep Knight EMT  12:06 PM

## 2025-04-17 NOTE — LETTER
4/17/2025      RE: Luis Alberto Garcia  [address] [place] [address]       Dear Colleague,    Thank you for the opportunity to participate in the care of your patient, Luis Alberto Garcia, at the [facility] [place] [facility]. Please see a copy of my visit note below.      [facility] - [facility]   Cardiology Clinic Note      HPI:   Ms. Garcia is a 74 year old female with medical history pertinent for coronary disease (status post CABG in September 2022), hypertension, post-op AF, and former smoking who presents to cardiology clinic for routine follow up.     Briefly, Ms. Garcia had been experiencing ~10 months of jaw/neck pain and dyspnea on exertion until she had a pharmacologic SPECT at [facility] in July 2022.  While the stress/perfusion part was unremarkable, she exhibited 1 mm downsloping ST depressions in the ECG portion (territory unclear if Care Everywhere records).  She subsequently underwent invasive coronary angiography at [facility], which yielded a 70% proximal LAD disease, with an 80% first diagonal lesion, 80% mid circumflex lesion, 70% OM1 lesion, and a 80% mid RCA lesion.  Ms. Garcia was then referred to cardiac surgery at the [facility] for coronary bypass grafting, which she successfully underwent in September 2022, as below.     Ms. Garcia's postoperative course was complicated by a left-sided pleural effusion that required thoracentesis and postoperative atrial fibrillation.  Ms. Garcia's atrial fibrillation manifested as palpitations, dyspnea, and presyncope.  An ECG in the cardiac surgery clinic demonstrated atrial fibrillation and she was subsequently sent to the ER.  In the emergency room, she underwent DCCV after she was started on amiodarone infusion.  Ms. Garcia was transitioned to oral amiodarone regimen along with apixaban and metoprolol succinate and has since followed up with   "Rene.    Most recently, in January 2023 Ms. Garcia established general cardiology care with Dr. Doshi. At that time she stated that she was \"nearly back to normal\".  She was interested in resuming downhill skiing this winter.  She has had issues with myalgias in the past with pravastatin, atorvastatin, and rosuvastatin.  Her primary care team had been administering a low-dose of rosuvastatin to see if she will be able to tolerate it.  Ms. Garcia had an unpleasant experience in cardiac rehab and decided to instead ramp up her physical activity at home. At her visit with Dr. Doshi they discussed that if she was unable to tolerate rosuvastatin and or it did not decrease her LDL to less than 70, it was advisable to initiate a PCSK9 inhibitor.     At our visit in April 2023, Ms. Garcia was quite concerned about worsening symptoms that included fatigue, dyspnea on exertion, palpitations, and neck and jaw pain. First noticed symptoms in February. Reported breathlessness after walking up a few stairs and that with exertion she was feeling a discomfort in her neck that \"feels like a sore throat\" and then jaw begins hurting. She had this type of neck/jaw pain prior to her CABG, but at a more severe degree. Initial evaluation completed by her PCP and included a CXR and EKG which were normal. Ms. Garcia increased her lasix to 40 mg and felt a dramatic improvement in her breathing.  Denied any other HF symptoms. Given these symptoms, elected to proceed with NM stress test and ambulatory monitor. NM stress test [facility] and was negative for ischemia. EF 74%. She also completed ABIs which were also normal. Reported stomach upset with rosuvastatin which she was only taking 5 mg M/W/F. Repatha at that time was cost prohibitive.      Follow up May 2023, reported dyspnea on exertion, but feeling less tired and no reports of palpitations. Also reported stomach upset, worse on days she takes rosuvastatin. " Discussed starting Repatha with insurance. Limited coverage. Price per dose $400. She does not qualify for patient assistance. Previous PFTs with evidence of emphysema. Instructed to follow up with PCP for further evaluation of her LOPEZ.     Today, Ms. Garcia reports feeling more fatigued with some mild LOPEZ. She tired taking lasix without improvement in symptoms. She also notes increased episodes of palpitations, both more frequent and longer in duration. Recently evaluated by PCP for headaches with severe dizziness. She was treated with doxy and had resolution of symptoms. She stopped taking her Repatha sometime last year. She thought it was causing leg cramping. FLP from 1/2025 with . Denies acute chest pain or tightness. No syncope or presyncope. No orthopnea, PND, LE edema.       Cardiac Medications   - ASA 81 mg daily   - Lasix 20 mg daily PRN   - Lisinopril 20 mg daily   - Repatha-not taking   - Rosuvastatin 10 mg daily PRN  - Diltiazem 120 mg daily       PAST MEDICAL HISTORY:  Past Medical History:   Diagnosis Date     Anxiety      Centrilobular emphysema (H)      Childhood asthma      Coronary artery disease involving native coronary artery of native heart      Esophageal reflux      Essential hypertension      Fatty liver disease      Hair loss      Migraine      Mild aortic stenosis      Mixed hyperlipidemia      MRSA infection     Skin abscesses after COVID vaccinations     Osteoarthritis      Primary insomnia        FAMILY HISTORY:  Family History   Problem Relation Age of Onset     Lung Cancer Mother      LUNG DISEASE Father      Hypothyroidism Sister      Hypothyroidism Sister      Osteosarcoma Sister      Anesthesia Reaction No family hx of        SOCIAL HISTORY:  Social History     Socioeconomic History     Marital status:    Tobacco Use     Smoking status: Former     Packs/day: 1.00     Years: 25.00     Pack years: 25.00     Types: Cigarettes     Quit date: 2/2/1992     Years since  quittin.2     Smokeless tobacco: Never   Substance and Sexual Activity     Alcohol use: Yes     Comment: socially       CURRENT MEDICATIONS:  Current Outpatient Medications   Medication Sig Dispense Refill     acyclovir (ZOVIRAX) 400 MG tablet Take 400 mg by mouth daily as needed       albuterol (PROAIR HFA/PROVENTIL HFA/VENTOLIN HFA) 108 (90 Base) MCG/ACT inhaler Inhale 2 puffs into the lungs every 6 hours       aspirin (ASA) 81 MG chewable tablet Take 1 tablet (81 mg) by mouth daily 60 tablet 3     Coenzyme Q10 (CO Q 10) 10 MG CAPS Take 1 capsule by mouth daily       diltiazem ER (TIAZAC) 120 MG 24 hr ER beaded capsule Take 120 mg by mouth daily       Ergocalciferol 50 MCG (2000 UT) TABS 50 mcg every morning       evolocumab (REPATHA) 140 MG/ML prefilled autoinjector Inject 1 mL (140 mg) Subcutaneous every 14 days (Patient not taking: Reported on 10/10/2024) 1 mL 11     fish oil-omega-3 fatty acids 1000 MG capsule Take 2 g by mouth every morning       furosemide (LASIX) 20 MG tablet Take 1 tablet (20 mg) by mouth daily as needed (take for increased shortness of breath, leg swelling) 90 tablet 2     gabapentin (NEURONTIN) 100 MG capsule 200 mg At Bedtime       lisinopril (ZESTRIL) 20 MG tablet Take 20 mg by mouth daily       magnesium 100 MG CAPS 200 mg At Bedtime       methocarbamol (ROBAXIN) 500 MG tablet Take 2 tablets (1,000 mg) by mouth every 6 hours as needed for muscle spasms or other (pain) (Patient not taking: Reported on 2023) 100 tablet 0     metoprolol succinate ER (TOPROL XL) 25 MG 24 hr tablet Take 25 mg by mouth every morning       mupirocin (BACTROBAN) 2 % external ointment as needed       nitroGLYcerin (NITROSTAT) 0.4 MG sublingual tablet 0.4 mg every 5 minutes as needed       nystatin (MYCOSTATIN) 437168 UNIT/ML suspension Swish and swallow or swish and spit 1 TEASPOONFUL 4 times per day for 10 days       omeprazole (PRILOSEC) 20 MG DR capsule 20 mg At Bedtime       ondansetron (ZOFRAN) 4  MG tablet Take 1 tablet (4 mg) by mouth every 8 hours as needed for nausea (take with ozempic dose increase) 30 tablet 0     PARoxetine (PAXIL) 20 MG tablet Take 20 mg by mouth every morning       sodium fluoride dental gel (PREVIDENT) 1.1 % GEL topical gel Brush 2x/day.  Do not eat or drink for 30 minutes after.       sulfamethoxazole-trimethoprim (BACTRIM) 400-80 MG tablet Take 1 tablet by mouth 2 times daily (Patient not taking: Reported on 9/30/2022) 14 tablet 0     zolpidem (AMBIEN) 5 MG tablet Take 0.5 tablets (2.5 mg) by mouth nightly as needed for sleep 10 tablet 0     No current facility-administered medications for this visit.       ROS:   Refer to HPI    EXAM:  /68 (BP Location: Left arm, Patient Position: Chair, Cuff Size: Adult Regular)   Pulse 71   Wt 77.8 kg (171 lb 9.6 oz)   SpO2 96%   BMI 27.28 kg/m     GENERAL: Appears comfortable, in no acute distress.   HEENT: Eye symmetrical, no discharge or icterus bilaterally. Mucous membranes moist and without lesions.  CV: RRR, +S1S2, no murmur, rub, or gallop. JVP near clavicle  RESPIRATORY: Respirations regular, even, and unlabored. Lungs CTA throughout.   GI: Soft and non distended with normoactive bowel sounds present in all quadrants. No tenderness, rebound, guarding. No hepatomegaly.   EXTREMITIES: No peripheral edema. 2+ bilateral pedal pulses.   NEUROLOGIC: Alert and oriented x 3. No focal deficits.   MUSCULOSKELETAL: No joint swelling or tenderness.   SKIN: No jaundice. No rashes or lesions.     Labs, reviewed with patient in clinic today:  CBC RESULTS:  Lab Results   Component Value Date    WBC 9.5 10/17/2022    RBC 4.47 10/17/2022    HGB 12.4 10/17/2022    HCT 39.4 10/17/2022    MCV 88 10/17/2022    MCH 27.7 10/17/2022    MCHC 31.5 10/17/2022    RDW 13.9 10/17/2022     (H) 10/17/2022       CMP RESULTS:  Lab Results   Component Value Date     (L) 10/17/2022    POTASSIUM 4.6 10/17/2022    POTASSIUM 4.1 09/14/2022    CHLORIDE  "101 05/11/2023    CO2 25 10/17/2022    ANIONGAP 11 10/17/2022     (H) 10/17/2022     (H) 09/16/2022    BUN 22.6 10/17/2022    CR 0.70 10/17/2022    GFRESTIMATED >90 10/17/2022    ALYCE 9.5 10/17/2022    BILITOTAL 0.3 09/21/2022    ALBUMIN 3.2 (L) 09/21/2022    ALKPHOS 60 09/21/2022    ALT 30 09/21/2022    AST 30 09/21/2022        INR RESULTS:  Lab Results   Component Value Date    INR 1.04 09/20/2022       Lab Results   Component Value Date    MAG 2.2 09/20/2022     No results found for: \"NTBNPI\"  Lab Results   Component Value Date    NTBNP 401 10/17/2022       Cardiac Diagnostics:          5/11/2023 NM Lexiscan (CareEverywhere)  Summary     1. A regadenoson infusion pharmacologic stress test was performed.     2. Functional capacity was not assessed.     3. Shortness of breath and jaw/throat discomfort with infusion.     4. Negative stress ECG for ST segment depression.     5. Myocardial perfusion imaging is normal.     6. Normal left ventricular systolic function. Calculated LVEF 74 %.     7. Based on this study, the annual cardiovascular mortality rate is low   (less than 1%).     Prior Study Comparison     Compared to the prior study from 7/2022, the current study reveals no   significant change.       1/6/2023 Electrocardiogram  NSR, no ischemic changes    Ambulatory ECG monitor from 11/29/2022 to 12/11/2022:      10/5/2022 Echo   Interpretation Summary  Global and regional left ventricular function is hyperkinetic with an EF of  65-70%.  Right ventricular function, chamber size, wall motion, and thickness are  normal.  Mild to moderate aortic insufficiency is present.  Pulmonary artery systolic pressure is normal.  The inferior vena cava is normal.  No significant changes noted.    8/24/2022 Echo ( Care Everywhere)  Summary     1. The left ventricular size is normal.  Systolic function is normal.     The   estimated ejection fraction is 65%.  Wall thickness is normal.  Left   ventricular segmental " wall motion is normal.     2. The right ventricular size is normal.  Systolic function is normal.     3. There is mild aortic valve stenosis with a peak velocity of 269.00   cm/s,   mean gradient of 18 mmHg, and aortic valve area of 1.54 cm2.     4. There is mild aortic valve regurgitation.     5. Compared to prior no significant change in AS.         Assessment and Plan:   Ms. Garcia is a 74 year old female with medical history pertinent for coronary disease (status post CABG in September 2022), hypertension, and former smoking who presents to cardiology clinic for routine follow up.     Reports of increased palpitations, with e/o PVCs on today's ECG. She is presently on diltiazem. We discussed options including ambulatory monitoring and increasing diltiazem. Ms. Garcia is agreeable to 7 day Zio. She would like to wait on any medication changes.   Given reports of increased LOPEZ, we will also get an updated echo to evaluate EF and AI.     Review of recent FLP not at goal with . She stopped repatha sometime last year. She thought it was a statin and causing leg cramping. She is agreeable to resuming.     # CAD s/p CABG (9/2022)  # HLD, goal LDL < 55  - Continue ASA   - discontinued BB per previous provider recommendation   - resume Repatha [id number]  - Update FLP in ~ 3 months    # HTN, controlled  - continue current antihypertensives     # Atrial fibrillation, post-operative s/p DCCV and brief AAD therapy with amiodarone  # PVCs   - ECG 1/2023: NSR  - CHADSVASC=+CAD, +age, +gender, +HTN  4, previously on eliquis, stopped in Jan '23 per EP  - continue diltiazem   - Zio monitor     # Mild to moderate aortic insufficiency   - update echo      Follow up:  - Cardiology SEVEN 6 months     Radha Hoff DNP, NP-C  General Cardiology   4/17/2025      Please do not hesitate to contact me if you have any questions/concerns.     Sincerely,     MARCOS Guzmán CNP

## 2025-04-17 NOTE — PATIENT INSTRUCTIONS
You were seen at the [facility] clinic today.   You saw THUY Roberson.    The following is a summary of your office visit today:    Mail out Zio 7 day heart monitor   Update ECHO (heart ultrasound)  Restart Repatha injections for your cholesterol. We will repeat your cholesterol panel in ~ 3 months  Please try and pursue moderate-intensity exercise for 30 minutes at least five times weekly.  Please try and maintain a diet rich in fruit and vegetables and low in saturated fats and sugars.   Follow up with cardiology in 6 months. We will discuss your heart monitor and echo results prior to follow up.       If you have questions after this visit:   Send a Sonico message or contact the Cardiology Nurse Line  Office:  [phone] option #1, then #4 and ask for a Cardiology Nurse  Fax:  [phone]   Appointments:  [phone] option #1, then option #1     HOW TO CHECK YOUR BLOOD PRESSURE AT HOME:    Avoid eating, smoking, and exercising for at least 30 minutes before taking a reading.    Be sure you have taken your BP medication at least 2-3 hours before you check it.     Sit quietly for 10 minutes before a reading.     Sit in a chair with your feet flat on the floor. Rest your  arm on a table so that the arm cuff is at the same level as your heart.    Remain still during the reading.    Record your blood pressure and pulse in a log and bring to your next appointment.     If you have had any blood work, imaging or other testing completed we will be in touch within 1-2 weeks regarding the results. If you have any questions, concerns or need to schedule a follow up, please contact us at the numbers above. If you are needing refills please contact your pharmacy. For urgent after-hours care please call the the [facility] at [phone] (option #4) and ask to speak to the on-call Cardiologist.    It was a pleasure meeting with you today. Please let us  know if there is anything else we can do for you so that we can be sure you are leaving completely satisfied with your care experience.     Your Cardiology Team at the [facility]

## 2025-04-30 ENCOUNTER — ANCILLARY PROCEDURE (OUTPATIENT)
Dept: CARDIOLOGY | Facility: CLINIC | Age: 75
End: 2025-04-30
Attending: NURSE PRACTITIONER
Payer: COMMERCIAL

## 2025-04-30 DIAGNOSIS — I35.0 MODERATE AORTIC STENOSIS: ICD-10-CM

## 2025-04-30 LAB — LVEF ECHO: NORMAL

## 2025-04-30 PROCEDURE — 93306 TTE W/DOPPLER COMPLETE: CPT | Performed by: INTERNAL MEDICINE

## 2025-05-03 LAB — CV ZIO PRELIM RESULTS: NORMAL

## 2025-05-07 ENCOUNTER — RESULTS FOLLOW-UP (OUTPATIENT)
Dept: CARDIOLOGY | Facility: CLINIC | Age: 75
End: 2025-05-07

## 2025-07-08 ENCOUNTER — TELEPHONE (OUTPATIENT)
Dept: CARDIOLOGY | Facility: CLINIC | Age: 75
End: 2025-07-08
Payer: COMMERCIAL

## 2025-07-08 NOTE — TELEPHONE ENCOUNTER
Patient Contacted for the patient to call back and schedule the following:    Appointment type: return Cardio  Provider: Luis Eduardo  Return date: 09/22  Specialty phone number: [phone] opt 1  Additional appointment(s) needed: Labs  Additonal Notes: NA

## (undated) DEVICE — BNDG ELASTIC 4"X5YDS STERILE 6611-4S

## (undated) DEVICE — BLADE KNIFE SURG 15C 371716

## (undated) DEVICE — SOL NACL 0.9% IRRIG 3000ML BAG 2B7477

## (undated) DEVICE — SOL NACL 0.9% 10ML VIAL 0409-4888-02

## (undated) DEVICE — DRSG DRAIN 4X4" 7086

## (undated) DEVICE — TAPE MEDIPORE 4"X2YD 2864

## (undated) DEVICE — PUNCH AORTIC 4.0MM LONG AP-540

## (undated) DEVICE — SU STEEL 6 CCS 4X18" M654G

## (undated) DEVICE — BNDG ELASTIC 6"X5YDS STERILE 6611-6S

## (undated) DEVICE — PREP CHLORAPREP 26ML TINTED HI-LITE ORANGE 930815

## (undated) DEVICE — SOL NACL 0.9% INJ 1000ML BAG 2B1324X

## (undated) DEVICE — SPECIMEN CONTAINER 5OZ STERILE 2600SA

## (undated) DEVICE — ESU HOLSTER PLASTIC DISP E2400

## (undated) DEVICE — SU PROLENE 3-0 SHDA 36" 8522H

## (undated) DEVICE — SU ETHIBOND 2-0 SHDA 30" X563H

## (undated) DEVICE — SU VICRYL 0 CTX 36" J370H

## (undated) DEVICE — ADH SKIN CLOSURE PREMIERPRO EXOFIN 1.0ML 3470

## (undated) DEVICE — BLADE KNIFE BEAVER MICROSHARP GREEN 377515

## (undated) DEVICE — SPONGE LAP 12X12" X8425

## (undated) DEVICE — SU PLEDGET SOFT TFE 3/8"X3/26"X1/16" PCP40

## (undated) DEVICE — BLADE SAW STERNAL 20X30MM KM-32

## (undated) DEVICE — BLADE KNIFE BEAVER MINI STR BEAVER6900

## (undated) DEVICE — SYR 01ML 27GA 0.5" NDL TBC 309623

## (undated) DEVICE — SPONGE RAY-TEC 4X8" 7318

## (undated) DEVICE — SOL NACL 0.9% IRRIG 1000ML BOTTLE 2F7124

## (undated) DEVICE — WAND SUCTION LP SOFT 15.2CM SU-22702

## (undated) DEVICE — PROTECTOR ARM ONE-STEP TRENDELENBURG 40418

## (undated) DEVICE — DEFIB PRO-PADZ LVP LQD GEL ADULT 8900-2105-01

## (undated) DEVICE — SU PROLENE 4-0 SHDA 36" 8521H

## (undated) DEVICE — LINEN TOWEL PACK X6 WHITE 5487

## (undated) DEVICE — CANNULA VESSEL DLP  30001

## (undated) DEVICE — ESU PENCIL SMOKE EVAC W/ROCKER SWITCH 0703-047-000

## (undated) DEVICE — SOL WATER IRRIG 1000ML BOTTLE 2F7114

## (undated) DEVICE — DRSG TELFA 3X8" 1238

## (undated) DEVICE — DECANTER BAG 2002S

## (undated) DEVICE — ESU ELEC BLADE E-SEP INSULATED NEPTUNE 70MM 0703-070-002

## (undated) DEVICE — SU PROLENE 7-0 BV-1DA 4X24" M8702

## (undated) DEVICE — GLOVE PROTEXIS POWDER FREE 7.0 ORTHOPEDIC 2D73ET70

## (undated) DEVICE — ESU ELEC BLADE 6" COATED/INSULATED E1455-6

## (undated) DEVICE — TUBING INSUFFLATION PNEUMOCLEAR 0620050100

## (undated) DEVICE — SURGICEL HEMOSTAT 4X8" 1952

## (undated) DEVICE — SU PROLENE 4-0 RB-1DA 36" 8557H

## (undated) DEVICE — DRSG ABDOMINAL 07 1/2X8" 7197D

## (undated) DEVICE — SU PROLENE 6-0 C-1DA 4X24" M8726

## (undated) DEVICE — SUCTION MANIFOLD NEPTUNE 2 SYS 4 PORT 0702-020-000

## (undated) DEVICE — LEAD PACER MYOCARDIAL BIPOLAR TEMPORARY 53CM 6495F

## (undated) DEVICE — DRAPE IOBAN INCISE 23X17" 6650EZ

## (undated) DEVICE — CLIP HORIZON LG ORANGE 004200

## (undated) DEVICE — ANTIFOG SOLUTION W/FOAM PAD 31142527

## (undated) DEVICE — CLIP HORIZON MED BLUE 002200

## (undated) DEVICE — SU VICRYL 3-0 FS-1 27" J442H

## (undated) DEVICE — SU SILK 0 TIE 6X30" A306H

## (undated) DEVICE — SU PROLENE 6-0 C-1DA 30" 8706H

## (undated) DEVICE — TIES BANDING T50R

## (undated) DEVICE — SU ETHIBOND 0 CT-1 CR 8X18" CX21D

## (undated) DEVICE — SU PROLENE 7-0 BV-1DA 24" 8702H

## (undated) DEVICE — DRAIN CHEST TUBE 36FR STR 8036

## (undated) DEVICE — CLIP HORIZON SM RED WIDE SLOT 001201

## (undated) DEVICE — CLIP SPRING FOGARTY SOFTJAW CSOFT6

## (undated) DEVICE — KIT ENDO VASOVIEW HEMOPRO 2 VH-4000

## (undated) DEVICE — SU ETHIBOND 3-0 BBDA 36" X588H

## (undated) DEVICE — SUCTION CATH AIRLIFE TRI-FLO W/CONTROL PORT 14FR  T60C

## (undated) DEVICE — ESU ELEC BLADE 2.75" COATED/INSULATED E1455

## (undated) DEVICE — SUCTION DRY CHEST DRAIN OASIS 3600-100

## (undated) DEVICE — SU RETRACT-O-TAPE 1041

## (undated) DEVICE — WIPES FOLEY CARE SURESTEP PROVON DFC100

## (undated) DEVICE — DRAPE SLUSH/WARMER 66X44" ORS-320

## (undated) DEVICE — DRAIN CHEST TUBE EXTENDED STR LF 19907

## (undated) DEVICE — RX SURGIFLO HEMOSTATIC MATRIX W/THROMBIN 8ML 2994

## (undated) DEVICE — BLADE CLIPPER SGL USE 9680

## (undated) DEVICE — DRAIN CHEST TUBE RIGHT ANGLED 28FR 8128

## (undated) DEVICE — PACK ADULT HEART UMMC PV15CG92D

## (undated) DEVICE — LINEN TOWEL PACK X30 5481

## (undated) DEVICE — SU VICRYL 2-0 CT-1 27" UND J259H

## (undated) RX ORDER — CEFAZOLIN SODIUM 1 G/3ML
INJECTION, POWDER, FOR SOLUTION INTRAMUSCULAR; INTRAVENOUS
Status: DISPENSED
Start: 2022-09-14

## (undated) RX ORDER — FAMOTIDINE 20 MG/1
TABLET, FILM COATED ORAL
Status: DISPENSED
Start: 2022-09-14

## (undated) RX ORDER — GABAPENTIN 100 MG/1
CAPSULE ORAL
Status: DISPENSED
Start: 2022-09-14

## (undated) RX ORDER — FENTANYL CITRATE 50 UG/ML
INJECTION, SOLUTION INTRAMUSCULAR; INTRAVENOUS
Status: DISPENSED
Start: 2022-09-14

## (undated) RX ORDER — LIDOCAINE HYDROCHLORIDE 20 MG/ML
INJECTION, SOLUTION EPIDURAL; INFILTRATION; INTRACAUDAL; PERINEURAL
Status: DISPENSED
Start: 2022-09-14

## (undated) RX ORDER — HEPARIN SODIUM 1000 [USP'U]/ML
INJECTION, SOLUTION INTRAVENOUS; SUBCUTANEOUS
Status: DISPENSED
Start: 2022-09-14

## (undated) RX ORDER — PROPOFOL 10 MG/ML
INJECTION, EMULSION INTRAVENOUS
Status: DISPENSED
Start: 2022-09-14

## (undated) RX ORDER — ASPIRIN 81 MG/1
TABLET, CHEWABLE ORAL
Status: DISPENSED
Start: 2022-09-14

## (undated) RX ORDER — CHLORHEXIDINE GLUCONATE ORAL RINSE 1.2 MG/ML
SOLUTION DENTAL
Status: DISPENSED
Start: 2022-09-14

## (undated) RX ORDER — EPHEDRINE SULFATE 50 MG/ML
INJECTION, SOLUTION INTRAMUSCULAR; INTRAVENOUS; SUBCUTANEOUS
Status: DISPENSED
Start: 2022-09-14

## (undated) RX ORDER — ACETAMINOPHEN 325 MG/1
TABLET ORAL
Status: DISPENSED
Start: 2022-09-14

## (undated) RX ORDER — LIDOCAINE HYDROCHLORIDE 10 MG/ML
INJECTION, SOLUTION EPIDURAL; INFILTRATION; INTRACAUDAL; PERINEURAL
Status: DISPENSED
Start: 2022-09-23

## (undated) RX ORDER — CEFAZOLIN SODIUM/WATER 2 G/20 ML
SYRINGE (ML) INTRAVENOUS
Status: DISPENSED
Start: 2022-09-14

## (undated) RX ORDER — FENTANYL CITRATE-0.9 % NACL/PF 10 MCG/ML
PLASTIC BAG, INJECTION (ML) INTRAVENOUS
Status: DISPENSED
Start: 2022-09-14

## (undated) RX ORDER — PAPAVERINE HYDROCHLORIDE 30 MG/ML
INJECTION INTRAMUSCULAR; INTRAVENOUS
Status: DISPENSED
Start: 2022-09-14